# Patient Record
Sex: FEMALE | Race: BLACK OR AFRICAN AMERICAN | NOT HISPANIC OR LATINO | URBAN - METROPOLITAN AREA
[De-identification: names, ages, dates, MRNs, and addresses within clinical notes are randomized per-mention and may not be internally consistent; named-entity substitution may affect disease eponyms.]

---

## 2017-01-01 ENCOUNTER — OUTPATIENT (OUTPATIENT)
Dept: OUTPATIENT SERVICES | Facility: HOSPITAL | Age: 67
LOS: 1 days | Discharge: HOME | End: 2017-01-01

## 2017-01-01 DIAGNOSIS — F32.9 MAJOR DEPRESSIVE DISORDER, SINGLE EPISODE, UNSPECIFIED: ICD-10-CM

## 2017-01-11 ENCOUNTER — APPOINTMENT (OUTPATIENT)
Dept: PSYCHIATRY | Facility: CLINIC | Age: 67
End: 2017-01-11

## 2017-01-18 ENCOUNTER — APPOINTMENT (OUTPATIENT)
Dept: PSYCHIATRY | Facility: CLINIC | Age: 67
End: 2017-01-18

## 2017-02-09 ENCOUNTER — APPOINTMENT (OUTPATIENT)
Dept: PSYCHIATRY | Facility: CLINIC | Age: 67
End: 2017-02-09

## 2017-02-22 ENCOUNTER — APPOINTMENT (OUTPATIENT)
Dept: PSYCHIATRY | Facility: CLINIC | Age: 67
End: 2017-02-22

## 2017-03-15 ENCOUNTER — APPOINTMENT (OUTPATIENT)
Dept: PSYCHIATRY | Facility: CLINIC | Age: 67
End: 2017-03-15

## 2017-03-28 ENCOUNTER — APPOINTMENT (OUTPATIENT)
Dept: PSYCHIATRY | Facility: CLINIC | Age: 67
End: 2017-03-28

## 2017-03-29 ENCOUNTER — APPOINTMENT (OUTPATIENT)
Dept: PSYCHIATRY | Facility: CLINIC | Age: 67
End: 2017-03-29

## 2017-04-04 ENCOUNTER — APPOINTMENT (OUTPATIENT)
Dept: PSYCHIATRY | Facility: CLINIC | Age: 67
End: 2017-04-04

## 2017-04-18 ENCOUNTER — APPOINTMENT (OUTPATIENT)
Dept: PSYCHIATRY | Facility: CLINIC | Age: 67
End: 2017-04-18

## 2017-05-02 ENCOUNTER — APPOINTMENT (OUTPATIENT)
Dept: PSYCHIATRY | Facility: CLINIC | Age: 67
End: 2017-05-02

## 2017-05-04 ENCOUNTER — RESULT REVIEW (OUTPATIENT)
Age: 67
End: 2017-05-04

## 2017-05-04 ENCOUNTER — OUTPATIENT (OUTPATIENT)
Dept: OUTPATIENT SERVICES | Facility: HOSPITAL | Age: 67
LOS: 1 days | Discharge: HOME | End: 2017-05-04

## 2017-05-10 DIAGNOSIS — H02.825 CYSTS OF LEFT LOWER EYELID: ICD-10-CM

## 2017-05-10 LAB — SURGICAL PATHOLOGY STUDY: SIGNIFICANT CHANGE UP

## 2017-06-09 ENCOUNTER — APPOINTMENT (OUTPATIENT)
Dept: PSYCHIATRY | Facility: CLINIC | Age: 67
End: 2017-06-09

## 2017-06-14 ENCOUNTER — APPOINTMENT (OUTPATIENT)
Dept: PSYCHIATRY | Facility: CLINIC | Age: 67
End: 2017-06-14

## 2017-06-28 ENCOUNTER — APPOINTMENT (OUTPATIENT)
Dept: PSYCHIATRY | Facility: CLINIC | Age: 67
End: 2017-06-28

## 2017-07-01 ENCOUNTER — OUTPATIENT (OUTPATIENT)
Dept: OUTPATIENT SERVICES | Facility: HOSPITAL | Age: 67
LOS: 1 days | Discharge: HOME | End: 2017-07-01

## 2017-07-13 ENCOUNTER — APPOINTMENT (OUTPATIENT)
Dept: PSYCHIATRY | Facility: CLINIC | Age: 67
End: 2017-07-13

## 2017-08-16 ENCOUNTER — APPOINTMENT (OUTPATIENT)
Dept: PSYCHIATRY | Facility: CLINIC | Age: 67
End: 2017-08-16

## 2017-08-16 DIAGNOSIS — F32.2 MAJOR DEPRESSIVE DISORDER, SINGLE EPISODE, SEVERE WITHOUT PSYCHOTIC FEATURES: ICD-10-CM

## 2017-09-07 ENCOUNTER — APPOINTMENT (OUTPATIENT)
Dept: PSYCHIATRY | Facility: CLINIC | Age: 67
End: 2017-09-07
Payer: MEDICARE

## 2017-09-07 PROCEDURE — 99214 OFFICE O/P EST MOD 30 MIN: CPT

## 2017-09-25 ENCOUNTER — APPOINTMENT (OUTPATIENT)
Dept: PSYCHIATRY | Facility: CLINIC | Age: 67
End: 2017-09-25

## 2017-09-27 ENCOUNTER — APPOINTMENT (OUTPATIENT)
Dept: BREAST CENTER | Facility: CLINIC | Age: 67
End: 2017-09-27
Payer: MEDICARE

## 2017-09-27 VITALS
TEMPERATURE: 98 F | SYSTOLIC BLOOD PRESSURE: 146 MMHG | HEIGHT: 69 IN | HEART RATE: 80 BPM | BODY MASS INDEX: 25.77 KG/M2 | WEIGHT: 174 LBS | DIASTOLIC BLOOD PRESSURE: 85 MMHG

## 2017-09-27 DIAGNOSIS — Z80.3 FAMILY HISTORY OF MALIGNANT NEOPLASM OF BREAST: ICD-10-CM

## 2017-09-27 PROCEDURE — 99205 OFFICE O/P NEW HI 60 MIN: CPT

## 2017-10-01 ENCOUNTER — FORM ENCOUNTER (OUTPATIENT)
Age: 67
End: 2017-10-01

## 2017-10-02 ENCOUNTER — OUTPATIENT (OUTPATIENT)
Dept: OUTPATIENT SERVICES | Facility: HOSPITAL | Age: 67
LOS: 1 days | End: 2017-10-02
Payer: MEDICARE

## 2017-10-02 PROCEDURE — 76641 ULTRASOUND BREAST COMPLETE: CPT | Mod: 26,50

## 2017-10-04 ENCOUNTER — RESULT REVIEW (OUTPATIENT)
Age: 67
End: 2017-10-04

## 2017-10-04 PROCEDURE — 88321 CONSLTJ&REPRT SLD PREP ELSWR: CPT

## 2017-10-04 PROCEDURE — 76641 ULTRASOUND BREAST COMPLETE: CPT

## 2017-10-05 LAB
SURGICAL PATHOLOGY STUDY: SIGNIFICANT CHANGE UP
SURGICAL PATHOLOGY STUDY: SIGNIFICANT CHANGE UP

## 2017-10-06 ENCOUNTER — CLINICAL ADVICE (OUTPATIENT)
Age: 67
End: 2017-10-06

## 2017-10-06 ENCOUNTER — APPOINTMENT (OUTPATIENT)
Dept: PSYCHIATRY | Facility: CLINIC | Age: 67
End: 2017-10-06

## 2017-10-11 ENCOUNTER — APPOINTMENT (OUTPATIENT)
Dept: PSYCHIATRY | Facility: CLINIC | Age: 67
End: 2017-10-11
Payer: MEDICARE

## 2017-10-11 PROCEDURE — 99214 OFFICE O/P EST MOD 30 MIN: CPT

## 2017-10-19 ENCOUNTER — APPOINTMENT (OUTPATIENT)
Dept: PSYCHIATRY | Facility: CLINIC | Age: 67
End: 2017-10-19
Payer: MEDICARE

## 2017-10-19 PROCEDURE — 99213 OFFICE O/P EST LOW 20 MIN: CPT

## 2017-10-24 ENCOUNTER — FORM ENCOUNTER (OUTPATIENT)
Age: 67
End: 2017-10-24

## 2017-10-25 ENCOUNTER — OUTPATIENT (OUTPATIENT)
Dept: OUTPATIENT SERVICES | Facility: HOSPITAL | Age: 67
LOS: 1 days | End: 2017-10-25
Payer: MEDICARE

## 2017-10-25 PROCEDURE — C1739: CPT

## 2017-10-25 PROCEDURE — G0206: CPT | Mod: 26,RT

## 2017-10-25 PROCEDURE — 77065 DX MAMMO INCL CAD UNI: CPT

## 2017-10-25 PROCEDURE — 19285 PERQ DEV BREAST 1ST US IMAG: CPT | Mod: RT

## 2017-10-25 PROCEDURE — 19285 PERQ DEV BREAST 1ST US IMAG: CPT

## 2017-10-27 VITALS
OXYGEN SATURATION: 99 % | DIASTOLIC BLOOD PRESSURE: 77 MMHG | WEIGHT: 175.71 LBS | RESPIRATION RATE: 16 BRPM | SYSTOLIC BLOOD PRESSURE: 141 MMHG | TEMPERATURE: 98 F | HEART RATE: 84 BPM | HEIGHT: 68 IN

## 2017-10-27 NOTE — ASU PATIENT PROFILE, ADULT - PSH
Eardrum rupture, left    Foot pain  bilateral bone between pinky toe on the left and the right  Hernia, umbilical  as a child Eardrum rupture, left    Foot pain  bilateral bone between pinky toe on the left and the right  Hernia, umbilical  as a child  Lesion of eye  left eye stye

## 2017-10-29 ENCOUNTER — FORM ENCOUNTER (OUTPATIENT)
Age: 67
End: 2017-10-29

## 2017-10-29 ENCOUNTER — OUTPATIENT (OUTPATIENT)
Dept: OUTPATIENT SERVICES | Facility: HOSPITAL | Age: 67
LOS: 1 days | End: 2017-10-29
Payer: MEDICARE

## 2017-10-29 DIAGNOSIS — C50.911 MALIGNANT NEOPLASM OF UNSPECIFIED SITE OF RIGHT FEMALE BREAST: ICD-10-CM

## 2017-10-30 ENCOUNTER — RESULT REVIEW (OUTPATIENT)
Age: 67
End: 2017-10-30

## 2017-10-30 ENCOUNTER — OUTPATIENT (OUTPATIENT)
Dept: OUTPATIENT SERVICES | Facility: HOSPITAL | Age: 67
LOS: 1 days | Discharge: HOME | End: 2017-10-30
Payer: MEDICARE

## 2017-10-30 VITALS
OXYGEN SATURATION: 97 % | RESPIRATION RATE: 16 BRPM | DIASTOLIC BLOOD PRESSURE: 78 MMHG | HEART RATE: 79 BPM | SYSTOLIC BLOOD PRESSURE: 145 MMHG

## 2017-10-30 DIAGNOSIS — K42.9 UMBILICAL HERNIA WITHOUT OBSTRUCTION OR GANGRENE: Chronic | ICD-10-CM

## 2017-10-30 DIAGNOSIS — H72.92 UNSPECIFIED PERFORATION OF TYMPANIC MEMBRANE, LEFT EAR: Chronic | ICD-10-CM

## 2017-10-30 DIAGNOSIS — H57.9 UNSPECIFIED DISORDER OF EYE AND ADNEXA: Chronic | ICD-10-CM

## 2017-10-30 DIAGNOSIS — M79.673 PAIN IN UNSPECIFIED FOOT: Chronic | ICD-10-CM

## 2017-10-30 PROCEDURE — 78195 LYMPH SYSTEM IMAGING: CPT

## 2017-10-30 PROCEDURE — 88307 TISSUE EXAM BY PATHOLOGIST: CPT

## 2017-10-30 PROCEDURE — 19301 PARTIAL MASTECTOMY: CPT | Mod: GC

## 2017-10-30 PROCEDURE — 19301 PARTIAL MASTECTOMY: CPT | Mod: RT

## 2017-10-30 PROCEDURE — 15777 ACELLULAR DERM MATRIX IMPLT: CPT

## 2017-10-30 PROCEDURE — 38525 BIOPSY/REMOVAL LYMPH NODES: CPT | Mod: RT

## 2017-10-30 PROCEDURE — 88341 IMHCHEM/IMCYTCHM EA ADD ANTB: CPT

## 2017-10-30 PROCEDURE — 88342 IMHCHEM/IMCYTCHM 1ST ANTB: CPT

## 2017-10-30 PROCEDURE — A4648: CPT

## 2017-10-30 PROCEDURE — 76098 X-RAY EXAM SURGICAL SPECIMEN: CPT

## 2017-10-30 PROCEDURE — 38525 BIOPSY/REMOVAL LYMPH NODES: CPT | Mod: RT,GC

## 2017-10-30 PROCEDURE — A9541: CPT

## 2017-10-30 PROCEDURE — 88360 TUMOR IMMUNOHISTOCHEM/MANUAL: CPT

## 2017-10-30 PROCEDURE — 76098 X-RAY EXAM SURGICAL SPECIMEN: CPT | Mod: 26

## 2017-10-30 PROCEDURE — 78195 LYMPH SYSTEM IMAGING: CPT | Mod: 26

## 2017-10-30 RX ORDER — ONDANSETRON 8 MG/1
4 TABLET, FILM COATED ORAL EVERY 4 HOURS
Qty: 0 | Refills: 0 | Status: DISCONTINUED | OUTPATIENT
Start: 2017-10-30 | End: 2017-10-30

## 2017-10-30 RX ORDER — HYDROCODONE BITARTRATE AND ACETAMINOPHEN 7.5; 325 MG/15ML; MG/15ML
1 SOLUTION ORAL
Qty: 28 | Refills: 0
Start: 2017-10-30 | End: 2017-11-06

## 2017-10-30 RX ORDER — OXYCODONE AND ACETAMINOPHEN 5; 325 MG/1; MG/1
1 TABLET ORAL EVERY 4 HOURS
Qty: 0 | Refills: 0 | Status: DISCONTINUED | OUTPATIENT
Start: 2017-10-30 | End: 2017-10-30

## 2017-10-30 NOTE — BRIEF OPERATIVE NOTE - PROCEDURE
<<-----Click on this checkbox to enter Procedure Lumpectomy of right breast with sentinel node biopsy  10/30/2017    Active  ELVIS

## 2017-10-30 NOTE — PACU DISCHARGE NOTE - COMMENTS
pt verbalized undesratndig all discharged instructions  iv discontinuo  pt left accompanied by her daugther

## 2017-11-01 PROBLEM — F32.9 MAJOR DEPRESSIVE DISORDER, SINGLE EPISODE, UNSPECIFIED: Chronic | Status: ACTIVE | Noted: 2017-10-27

## 2017-11-02 DIAGNOSIS — Z91.041 RADIOGRAPHIC DYE ALLERGY STATUS: ICD-10-CM

## 2017-11-02 DIAGNOSIS — Z88.0 ALLERGY STATUS TO PENICILLIN: ICD-10-CM

## 2017-11-02 DIAGNOSIS — D05.11 INTRADUCTAL CARCINOMA IN SITU OF RIGHT BREAST: ICD-10-CM

## 2017-11-02 DIAGNOSIS — K21.9 GASTRO-ESOPHAGEAL REFLUX DISEASE WITHOUT ESOPHAGITIS: ICD-10-CM

## 2017-11-02 DIAGNOSIS — Z88.5 ALLERGY STATUS TO NARCOTIC AGENT: ICD-10-CM

## 2017-11-02 DIAGNOSIS — F17.210 NICOTINE DEPENDENCE, CIGARETTES, UNCOMPLICATED: ICD-10-CM

## 2017-11-02 DIAGNOSIS — N60.91 UNSPECIFIED BENIGN MAMMARY DYSPLASIA OF RIGHT BREAST: ICD-10-CM

## 2017-11-02 DIAGNOSIS — Z88.1 ALLERGY STATUS TO OTHER ANTIBIOTIC AGENTS STATUS: ICD-10-CM

## 2017-11-02 DIAGNOSIS — Z17.0 ESTROGEN RECEPTOR POSITIVE STATUS [ER+]: ICD-10-CM

## 2017-11-02 DIAGNOSIS — F32.9 MAJOR DEPRESSIVE DISORDER, SINGLE EPISODE, UNSPECIFIED: ICD-10-CM

## 2017-11-02 LAB — SURGICAL PATHOLOGY STUDY: SIGNIFICANT CHANGE UP

## 2017-11-10 ENCOUNTER — APPOINTMENT (OUTPATIENT)
Dept: PSYCHIATRY | Facility: CLINIC | Age: 67
End: 2017-11-10

## 2017-11-10 ENCOUNTER — APPOINTMENT (OUTPATIENT)
Dept: BREAST CENTER | Facility: CLINIC | Age: 67
End: 2017-11-10
Payer: MEDICARE

## 2017-11-10 VITALS
DIASTOLIC BLOOD PRESSURE: 95 MMHG | HEIGHT: 69 IN | TEMPERATURE: 97.5 F | HEART RATE: 96 BPM | BODY MASS INDEX: 25.77 KG/M2 | SYSTOLIC BLOOD PRESSURE: 146 MMHG | WEIGHT: 174 LBS | RESPIRATION RATE: 16 BRPM

## 2017-11-10 DIAGNOSIS — N64.89 OTHER SPECIFIED DISORDERS OF BREAST: ICD-10-CM

## 2017-11-10 PROCEDURE — 99024 POSTOP FOLLOW-UP VISIT: CPT

## 2017-11-16 ENCOUNTER — FORM ENCOUNTER (OUTPATIENT)
Age: 67
End: 2017-11-16

## 2017-11-17 ENCOUNTER — OUTPATIENT (OUTPATIENT)
Dept: OUTPATIENT SERVICES | Facility: HOSPITAL | Age: 67
LOS: 1 days | End: 2017-11-17
Payer: MEDICARE

## 2017-11-17 DIAGNOSIS — K42.9 UMBILICAL HERNIA WITHOUT OBSTRUCTION OR GANGRENE: Chronic | ICD-10-CM

## 2017-11-17 DIAGNOSIS — M79.673 PAIN IN UNSPECIFIED FOOT: Chronic | ICD-10-CM

## 2017-11-17 DIAGNOSIS — H57.9 UNSPECIFIED DISORDER OF EYE AND ADNEXA: Chronic | ICD-10-CM

## 2017-11-17 DIAGNOSIS — H72.92 UNSPECIFIED PERFORATION OF TYMPANIC MEMBRANE, LEFT EAR: Chronic | ICD-10-CM

## 2017-11-17 PROCEDURE — 76942 ECHO GUIDE FOR BIOPSY: CPT | Mod: 26

## 2017-11-17 PROCEDURE — 76942 ECHO GUIDE FOR BIOPSY: CPT

## 2017-11-17 PROCEDURE — 87070 CULTURE OTHR SPECIMN AEROBIC: CPT

## 2017-11-17 PROCEDURE — 87075 CULTR BACTERIA EXCEPT BLOOD: CPT

## 2017-11-17 PROCEDURE — 87205 SMEAR GRAM STAIN: CPT

## 2017-11-17 PROCEDURE — 87186 SC STD MICRODIL/AGAR DIL: CPT

## 2017-11-17 PROCEDURE — 10022: CPT

## 2017-11-18 LAB
GRAM STN FLD: SIGNIFICANT CHANGE UP
SPECIMEN SOURCE: SIGNIFICANT CHANGE UP

## 2017-11-20 LAB
-  CEFAZOLIN: SIGNIFICANT CHANGE UP
-  CLINDAMYCIN: SIGNIFICANT CHANGE UP
-  ERYTHROMYCIN: SIGNIFICANT CHANGE UP
-  LINEZOLID: SIGNIFICANT CHANGE UP
-  OXACILLIN: SIGNIFICANT CHANGE UP
-  PENICILLIN: SIGNIFICANT CHANGE UP
-  RIFAMPIN: SIGNIFICANT CHANGE UP
-  TRIMETHOPRIM/SULFAMETHOXAZOLE: SIGNIFICANT CHANGE UP
-  VANCOMYCIN: SIGNIFICANT CHANGE UP
CULTURE RESULTS: NO GROWTH — SIGNIFICANT CHANGE UP
CULTURE RESULTS: SIGNIFICANT CHANGE UP
METHOD TYPE: SIGNIFICANT CHANGE UP
ORGANISM # SPEC MICROSCOPIC CNT: SIGNIFICANT CHANGE UP
ORGANISM # SPEC MICROSCOPIC CNT: SIGNIFICANT CHANGE UP
SPECIMEN SOURCE: SIGNIFICANT CHANGE UP
SPECIMEN SOURCE: SIGNIFICANT CHANGE UP

## 2017-11-21 LAB
-  CEFAZOLIN: SIGNIFICANT CHANGE UP
-  CEFAZOLIN: SIGNIFICANT CHANGE UP
-  CLINDAMYCIN: SIGNIFICANT CHANGE UP
-  CLINDAMYCIN: SIGNIFICANT CHANGE UP
-  ERYTHROMYCIN: SIGNIFICANT CHANGE UP
-  ERYTHROMYCIN: SIGNIFICANT CHANGE UP
-  LINEZOLID: SIGNIFICANT CHANGE UP
-  LINEZOLID: SIGNIFICANT CHANGE UP
-  OXACILLIN: SIGNIFICANT CHANGE UP
-  OXACILLIN: SIGNIFICANT CHANGE UP
-  PENICILLIN: SIGNIFICANT CHANGE UP
-  PENICILLIN: SIGNIFICANT CHANGE UP
-  RIFAMPIN: SIGNIFICANT CHANGE UP
-  RIFAMPIN: SIGNIFICANT CHANGE UP
-  TRIMETHOPRIM/SULFAMETHOXAZOLE: SIGNIFICANT CHANGE UP
-  TRIMETHOPRIM/SULFAMETHOXAZOLE: SIGNIFICANT CHANGE UP
-  VANCOMYCIN: SIGNIFICANT CHANGE UP
-  VANCOMYCIN: SIGNIFICANT CHANGE UP
CULTURE RESULTS: SIGNIFICANT CHANGE UP
CULTURE RESULTS: SIGNIFICANT CHANGE UP
METHOD TYPE: SIGNIFICANT CHANGE UP
METHOD TYPE: SIGNIFICANT CHANGE UP
ORGANISM # SPEC MICROSCOPIC CNT: SIGNIFICANT CHANGE UP
SPECIMEN SOURCE: SIGNIFICANT CHANGE UP
SPECIMEN SOURCE: SIGNIFICANT CHANGE UP

## 2017-11-27 ENCOUNTER — APPOINTMENT (OUTPATIENT)
Dept: RADIATION ONCOLOGY | Facility: CLINIC | Age: 67
End: 2017-11-27
Payer: MEDICARE

## 2017-11-27 VITALS
SYSTOLIC BLOOD PRESSURE: 156 MMHG | WEIGHT: 180.56 LBS | HEART RATE: 85 BPM | DIASTOLIC BLOOD PRESSURE: 93 MMHG | BODY MASS INDEX: 26.66 KG/M2 | OXYGEN SATURATION: 99 %

## 2017-11-27 DIAGNOSIS — Z87.19 PERSONAL HISTORY OF OTHER DISEASES OF THE DIGESTIVE SYSTEM: ICD-10-CM

## 2017-11-27 PROCEDURE — 99205 OFFICE O/P NEW HI 60 MIN: CPT | Mod: 25

## 2017-11-27 PROCEDURE — 77263 THER RADIOLOGY TX PLNG CPLX: CPT

## 2017-11-27 RX ORDER — ALPRAZOLAM 0.25 MG/1
0.25 TABLET ORAL
Qty: 10 | Refills: 0 | Status: DISCONTINUED | COMMUNITY
Start: 2017-09-27 | End: 2017-11-27

## 2017-11-28 ENCOUNTER — OTHER (OUTPATIENT)
Age: 67
End: 2017-11-28

## 2017-11-29 ENCOUNTER — APPOINTMENT (OUTPATIENT)
Dept: BREAST CENTER | Facility: CLINIC | Age: 67
End: 2017-11-29

## 2017-12-04 ENCOUNTER — OTHER (OUTPATIENT)
Age: 67
End: 2017-12-04

## 2017-12-06 ENCOUNTER — FORM ENCOUNTER (OUTPATIENT)
Age: 67
End: 2017-12-06

## 2017-12-07 ENCOUNTER — OUTPATIENT (OUTPATIENT)
Dept: OUTPATIENT SERVICES | Facility: HOSPITAL | Age: 67
LOS: 1 days | End: 2017-12-07
Payer: MEDICARE

## 2017-12-07 DIAGNOSIS — M79.673 PAIN IN UNSPECIFIED FOOT: Chronic | ICD-10-CM

## 2017-12-07 DIAGNOSIS — H57.9 UNSPECIFIED DISORDER OF EYE AND ADNEXA: Chronic | ICD-10-CM

## 2017-12-07 DIAGNOSIS — K42.9 UMBILICAL HERNIA WITHOUT OBSTRUCTION OR GANGRENE: Chronic | ICD-10-CM

## 2017-12-07 DIAGNOSIS — H72.92 UNSPECIFIED PERFORATION OF TYMPANIC MEMBRANE, LEFT EAR: Chronic | ICD-10-CM

## 2017-12-07 PROCEDURE — 87070 CULTURE OTHR SPECIMN AEROBIC: CPT

## 2017-12-07 PROCEDURE — 76942 ECHO GUIDE FOR BIOPSY: CPT

## 2017-12-07 PROCEDURE — 87205 SMEAR GRAM STAIN: CPT

## 2017-12-07 PROCEDURE — 87075 CULTR BACTERIA EXCEPT BLOOD: CPT

## 2017-12-07 PROCEDURE — 10022: CPT

## 2017-12-07 PROCEDURE — 76942 ECHO GUIDE FOR BIOPSY: CPT | Mod: 26

## 2017-12-08 ENCOUNTER — APPOINTMENT (OUTPATIENT)
Dept: PSYCHIATRY | Facility: CLINIC | Age: 67
End: 2017-12-08
Payer: MEDICARE

## 2017-12-08 LAB
GRAM STN FLD: SIGNIFICANT CHANGE UP
SPECIMEN SOURCE: SIGNIFICANT CHANGE UP

## 2017-12-08 PROCEDURE — 99214 OFFICE O/P EST MOD 30 MIN: CPT

## 2017-12-08 RX ORDER — BUPROPION HYDROCHLORIDE 150 MG/1
1 TABLET, EXTENDED RELEASE ORAL
Qty: 30 | Refills: 2 | OUTPATIENT
Start: 2017-12-08 | End: 2018-03-07

## 2017-12-10 LAB
CULTURE RESULTS: NO GROWTH — SIGNIFICANT CHANGE UP
SPECIMEN SOURCE: SIGNIFICANT CHANGE UP

## 2017-12-11 ENCOUNTER — APPOINTMENT (OUTPATIENT)
Dept: PSYCHIATRY | Facility: CLINIC | Age: 67
End: 2017-12-11

## 2017-12-11 LAB
CULTURE RESULTS: NO GROWTH — SIGNIFICANT CHANGE UP
SPECIMEN SOURCE: SIGNIFICANT CHANGE UP

## 2017-12-19 ENCOUNTER — APPOINTMENT (OUTPATIENT)
Dept: PSYCHIATRY | Facility: CLINIC | Age: 67
End: 2017-12-19
Payer: MEDICARE

## 2017-12-19 PROCEDURE — 99213 OFFICE O/P EST LOW 20 MIN: CPT

## 2017-12-27 PROCEDURE — 77290 THER RAD SIMULAJ FIELD CPLX: CPT | Mod: 26

## 2017-12-27 PROCEDURE — 77334 RADIATION TREATMENT AID(S): CPT | Mod: 26

## 2018-01-01 ENCOUNTER — OUTPATIENT (OUTPATIENT)
Dept: OUTPATIENT SERVICES | Facility: HOSPITAL | Age: 68
LOS: 1 days | Discharge: HOME | End: 2018-01-01

## 2018-01-01 DIAGNOSIS — M79.673 PAIN IN UNSPECIFIED FOOT: Chronic | ICD-10-CM

## 2018-01-01 DIAGNOSIS — H57.9 UNSPECIFIED DISORDER OF EYE AND ADNEXA: Chronic | ICD-10-CM

## 2018-01-01 DIAGNOSIS — H72.92 UNSPECIFIED PERFORATION OF TYMPANIC MEMBRANE, LEFT EAR: Chronic | ICD-10-CM

## 2018-01-01 DIAGNOSIS — K42.9 UMBILICAL HERNIA WITHOUT OBSTRUCTION OR GANGRENE: Chronic | ICD-10-CM

## 2018-01-02 ENCOUNTER — APPOINTMENT (OUTPATIENT)
Dept: PSYCHIATRY | Facility: CLINIC | Age: 68
End: 2018-01-02

## 2018-01-02 DIAGNOSIS — F32.2 MAJOR DEPRESSIVE DISORDER, SINGLE EPISODE, SEVERE WITHOUT PSYCHOTIC FEATURES: ICD-10-CM

## 2018-01-04 PROCEDURE — 77300 RADIATION THERAPY DOSE PLAN: CPT | Mod: 26

## 2018-01-04 PROCEDURE — 77295 3-D RADIOTHERAPY PLAN: CPT | Mod: 26

## 2018-01-04 PROCEDURE — 77334 RADIATION TREATMENT AID(S): CPT | Mod: 26

## 2018-01-09 ENCOUNTER — APPOINTMENT (OUTPATIENT)
Dept: PSYCHIATRY | Facility: CLINIC | Age: 68
End: 2018-01-09
Payer: MEDICARE

## 2018-01-09 PROCEDURE — 99214 OFFICE O/P EST MOD 30 MIN: CPT

## 2018-01-11 PROCEDURE — 77280 THER RAD SIMULAJ FIELD SMPL: CPT | Mod: 26

## 2018-01-16 PROCEDURE — 77427 RADIATION TX MANAGEMENT X5: CPT

## 2018-01-16 PROCEDURE — 77387B: CUSTOM | Mod: 26

## 2018-01-17 VITALS
OXYGEN SATURATION: 100 % | WEIGHT: 176.56 LBS | DIASTOLIC BLOOD PRESSURE: 82 MMHG | SYSTOLIC BLOOD PRESSURE: 148 MMHG | HEART RATE: 85 BPM | BODY MASS INDEX: 26.07 KG/M2

## 2018-01-17 PROCEDURE — 77387B: CUSTOM | Mod: 26

## 2018-01-18 PROCEDURE — 77387B: CUSTOM | Mod: 26

## 2018-01-22 PROCEDURE — 77387B: CUSTOM | Mod: 26

## 2018-01-23 PROCEDURE — 77387B: CUSTOM | Mod: 26

## 2018-01-24 PROCEDURE — 77427 RADIATION TX MANAGEMENT X5: CPT

## 2018-01-24 PROCEDURE — 77387B: CUSTOM | Mod: 26

## 2018-01-25 ENCOUNTER — APPOINTMENT (OUTPATIENT)
Dept: PSYCHIATRY | Facility: CLINIC | Age: 68
End: 2018-01-25

## 2018-01-25 PROCEDURE — 77387B: CUSTOM | Mod: 26

## 2018-01-26 ENCOUNTER — APPOINTMENT (OUTPATIENT)
Dept: PSYCHIATRY | Facility: CLINIC | Age: 68
End: 2018-01-26
Payer: MEDICARE

## 2018-01-26 PROCEDURE — 77387B: CUSTOM | Mod: 26

## 2018-01-26 PROCEDURE — 99214 OFFICE O/P EST MOD 30 MIN: CPT

## 2018-01-29 PROCEDURE — 77387B: CUSTOM | Mod: 26

## 2018-01-30 PROCEDURE — 77387B: CUSTOM | Mod: 26

## 2018-01-31 VITALS
SYSTOLIC BLOOD PRESSURE: 136 MMHG | HEART RATE: 81 BPM | DIASTOLIC BLOOD PRESSURE: 92 MMHG | BODY MASS INDEX: 25.7 KG/M2 | WEIGHT: 174.06 LBS | OXYGEN SATURATION: 99 %

## 2018-01-31 PROCEDURE — 77387B: CUSTOM | Mod: 26

## 2018-02-01 PROCEDURE — 77387B: CUSTOM | Mod: 26

## 2018-02-01 PROCEDURE — 77427 RADIATION TX MANAGEMENT X5: CPT

## 2018-02-02 PROCEDURE — G6002: CPT | Mod: 26

## 2018-02-05 PROCEDURE — G6002: CPT | Mod: 26

## 2018-02-06 PROCEDURE — 77387B: CUSTOM | Mod: 26

## 2018-02-07 VITALS
HEART RATE: 76 BPM | WEIGHT: 174.2 LBS | RESPIRATION RATE: 18 BRPM | SYSTOLIC BLOOD PRESSURE: 130 MMHG | BODY MASS INDEX: 25.72 KG/M2 | OXYGEN SATURATION: 100 % | DIASTOLIC BLOOD PRESSURE: 88 MMHG

## 2018-02-07 PROCEDURE — 77387B: CUSTOM | Mod: 26

## 2018-02-09 ENCOUNTER — APPOINTMENT (OUTPATIENT)
Dept: BREAST CENTER | Facility: CLINIC | Age: 68
End: 2018-02-09
Payer: MEDICARE

## 2018-02-09 VITALS
RESPIRATION RATE: 16 BRPM | BODY MASS INDEX: 25.18 KG/M2 | SYSTOLIC BLOOD PRESSURE: 159 MMHG | DIASTOLIC BLOOD PRESSURE: 88 MMHG | HEART RATE: 70 BPM | WEIGHT: 170 LBS | HEIGHT: 69 IN

## 2018-02-09 PROCEDURE — 99213 OFFICE O/P EST LOW 20 MIN: CPT

## 2018-02-12 ENCOUNTER — APPOINTMENT (OUTPATIENT)
Dept: PSYCHIATRY | Facility: CLINIC | Age: 68
End: 2018-02-12

## 2018-02-15 ENCOUNTER — EMERGENCY (EMERGENCY)
Facility: HOSPITAL | Age: 68
LOS: 1 days | Discharge: HOME | End: 2018-02-15
Attending: EMERGENCY MEDICINE | Admitting: EMERGENCY MEDICINE
Payer: MEDICARE

## 2018-02-15 VITALS
DIASTOLIC BLOOD PRESSURE: 87 MMHG | SYSTOLIC BLOOD PRESSURE: 141 MMHG | RESPIRATION RATE: 16 BRPM | OXYGEN SATURATION: 99 % | TEMPERATURE: 98 F | HEART RATE: 77 BPM

## 2018-02-15 VITALS
HEART RATE: 97 BPM | DIASTOLIC BLOOD PRESSURE: 85 MMHG | OXYGEN SATURATION: 99 % | RESPIRATION RATE: 18 BRPM | SYSTOLIC BLOOD PRESSURE: 134 MMHG | TEMPERATURE: 99 F | WEIGHT: 172.4 LBS

## 2018-02-15 DIAGNOSIS — H57.9 UNSPECIFIED DISORDER OF EYE AND ADNEXA: Chronic | ICD-10-CM

## 2018-02-15 DIAGNOSIS — M79.673 PAIN IN UNSPECIFIED FOOT: Chronic | ICD-10-CM

## 2018-02-15 DIAGNOSIS — H72.92 UNSPECIFIED PERFORATION OF TYMPANIC MEMBRANE, LEFT EAR: Chronic | ICD-10-CM

## 2018-02-15 DIAGNOSIS — Z79.899 OTHER LONG TERM (CURRENT) DRUG THERAPY: ICD-10-CM

## 2018-02-15 DIAGNOSIS — Z79.891 LONG TERM (CURRENT) USE OF OPIATE ANALGESIC: ICD-10-CM

## 2018-02-15 DIAGNOSIS — Z91.048 OTHER NONMEDICINAL SUBSTANCE ALLERGY STATUS: ICD-10-CM

## 2018-02-15 DIAGNOSIS — I10 ESSENTIAL (PRIMARY) HYPERTENSION: ICD-10-CM

## 2018-02-15 DIAGNOSIS — Z88.0 ALLERGY STATUS TO PENICILLIN: ICD-10-CM

## 2018-02-15 DIAGNOSIS — Z91.013 ALLERGY TO SEAFOOD: ICD-10-CM

## 2018-02-15 DIAGNOSIS — K42.9 UMBILICAL HERNIA WITHOUT OBSTRUCTION OR GANGRENE: Chronic | ICD-10-CM

## 2018-02-15 LAB
ANION GAP SERPL CALC-SCNC: 14 MMOL/L — SIGNIFICANT CHANGE UP (ref 5–17)
BASOPHILS NFR BLD AUTO: 0.5 % — SIGNIFICANT CHANGE UP (ref 0–2)
BUN SERPL-MCNC: 12 MG/DL — SIGNIFICANT CHANGE UP (ref 7–23)
CALCIUM SERPL-MCNC: 10.3 MG/DL — SIGNIFICANT CHANGE UP (ref 8.4–10.5)
CHLORIDE SERPL-SCNC: 102 MMOL/L — SIGNIFICANT CHANGE UP (ref 96–108)
CO2 SERPL-SCNC: 24 MMOL/L — SIGNIFICANT CHANGE UP (ref 22–31)
CREAT SERPL-MCNC: 0.66 MG/DL — SIGNIFICANT CHANGE UP (ref 0.5–1.3)
EOSINOPHIL NFR BLD AUTO: 1.1 % — SIGNIFICANT CHANGE UP (ref 0–6)
GLUCOSE SERPL-MCNC: 98 MG/DL — SIGNIFICANT CHANGE UP (ref 70–99)
HCT VFR BLD CALC: 44.4 % — SIGNIFICANT CHANGE UP (ref 34.5–45)
HGB BLD-MCNC: 15.2 G/DL — SIGNIFICANT CHANGE UP (ref 11.5–15.5)
LYMPHOCYTES # BLD AUTO: 34.2 % — SIGNIFICANT CHANGE UP (ref 13–44)
MCHC RBC-ENTMCNC: 29.7 PG — SIGNIFICANT CHANGE UP (ref 27–34)
MCHC RBC-ENTMCNC: 34.2 G/DL — SIGNIFICANT CHANGE UP (ref 32–36)
MCV RBC AUTO: 86.7 FL — SIGNIFICANT CHANGE UP (ref 80–100)
MONOCYTES NFR BLD AUTO: 6.9 % — SIGNIFICANT CHANGE UP (ref 2–14)
NEUTROPHILS NFR BLD AUTO: 57.3 % — SIGNIFICANT CHANGE UP (ref 43–77)
PLATELET # BLD AUTO: 186 K/UL — SIGNIFICANT CHANGE UP (ref 150–400)
POTASSIUM SERPL-MCNC: 4.2 MMOL/L — SIGNIFICANT CHANGE UP (ref 3.5–5.3)
POTASSIUM SERPL-SCNC: 4.2 MMOL/L — SIGNIFICANT CHANGE UP (ref 3.5–5.3)
RBC # BLD: 5.12 M/UL — SIGNIFICANT CHANGE UP (ref 3.8–5.2)
RBC # FLD: 14.3 % — SIGNIFICANT CHANGE UP (ref 10.3–16.9)
SODIUM SERPL-SCNC: 140 MMOL/L — SIGNIFICANT CHANGE UP (ref 135–145)
TROPONIN T SERPL-MCNC: <0.01 NG/ML — SIGNIFICANT CHANGE UP (ref 0–0.01)
WBC # BLD: 10 K/UL — SIGNIFICANT CHANGE UP (ref 3.8–10.5)
WBC # FLD AUTO: 10 K/UL — SIGNIFICANT CHANGE UP (ref 3.8–10.5)

## 2018-02-15 PROCEDURE — 99283 EMERGENCY DEPT VISIT LOW MDM: CPT

## 2018-02-15 PROCEDURE — 84484 ASSAY OF TROPONIN QUANT: CPT

## 2018-02-15 PROCEDURE — 36415 COLL VENOUS BLD VENIPUNCTURE: CPT

## 2018-02-15 PROCEDURE — 85025 COMPLETE CBC W/AUTO DIFF WBC: CPT

## 2018-02-15 PROCEDURE — 80048 BASIC METABOLIC PNL TOTAL CA: CPT

## 2018-02-15 PROCEDURE — 99284 EMERGENCY DEPT VISIT MOD MDM: CPT

## 2018-02-15 NOTE — ED ADULT TRIAGE NOTE - CHIEF COMPLAINT QUOTE
Pt was referred by oncologist (receiving radiation for breast ca) to go to the ED for /64. Pt states had headache ~ 2 hours ago and felt better after eating. Denies any headache at the moment, n/v, dizziness or any other complaints.

## 2018-02-15 NOTE — ED PROVIDER NOTE - ATTENDING CONTRIBUTION TO CARE
Pt w asymtomatic htn, noted today as outpt and pharmacy. No cp, sob, abd pain, leg swelling, decreased  uop NAD, RRR, CTAB, no abdominal tenderness, no focal neuro deficits. To fu as oupt, Discussed with pt results of work up, strict return precautions, and need for follow up.  Pt expressed understanding and agrees with plan.

## 2018-02-15 NOTE — ED ADULT NURSE NOTE - OBJECTIVE STATEMENT
67y F, A&ox3, presents to ED for elevated bp, in 170s as per pt. PT reports was sent in by oncologist. PT denies headache, lightheadedness, dizziness, blurred vision. no facial droop, no slurring of speech, strength and sensation equal bilateral. NAD. Will continue to monitor.

## 2018-02-15 NOTE — ED PROVIDER NOTE - OBJECTIVE STATEMENT
66 y/o female with a PMHx of breast cancer who was sent by oncologist for an elevated BP earlier today. Pt states the reading was 160 then 170 and then she went to pharmacy after dc with a reading of 180 in which brought here to the ED. Pt denies the following: fever, chest pain, sob, difficulty breathing, headache, blurred vision.

## 2018-02-15 NOTE — ED PROVIDER NOTE - MEDICAL DECISION MAKING DETAILS
68 y/o female with no hx of elevated bp, had an abnormal bp reading at her oncology [facility] today. Here in ed with normal bp and hr. Pt given script for bp monitor and to fu with her pmd this week for further. pt agrees with plan and is safe for dc.

## 2018-02-28 ENCOUNTER — APPOINTMENT (OUTPATIENT)
Age: 68
End: 2018-02-28
Payer: MEDICARE

## 2018-03-06 ENCOUNTER — APPOINTMENT (OUTPATIENT)
Age: 68
End: 2018-03-06
Payer: MEDICARE

## 2018-03-06 VITALS
RESPIRATION RATE: 18 BRPM | DIASTOLIC BLOOD PRESSURE: 95 MMHG | HEART RATE: 95 BPM | BODY MASS INDEX: 24.84 KG/M2 | SYSTOLIC BLOOD PRESSURE: 156 MMHG | OXYGEN SATURATION: 96 % | WEIGHT: 168.2 LBS

## 2018-03-06 PROCEDURE — 99024 POSTOP FOLLOW-UP VISIT: CPT

## 2018-03-06 RX ORDER — ALPRAZOLAM 0.25 MG/1
0.25 TABLET ORAL
Qty: 3 | Refills: 0 | Status: DISCONTINUED | COMMUNITY
Start: 2017-12-21 | End: 2018-03-06

## 2018-03-06 RX ORDER — LORAZEPAM 1 MG/1
1 TABLET ORAL AS DIRECTED
Qty: 15 | Refills: 0 | Status: DISCONTINUED | COMMUNITY
Start: 2018-01-17 | End: 2018-03-06

## 2018-03-06 RX ORDER — ALPRAZOLAM 0.25 MG/1
0.25 TABLET ORAL
Qty: 3 | Refills: 0 | Status: DISCONTINUED | COMMUNITY
Start: 2017-12-04 | End: 2018-03-06

## 2018-03-07 ENCOUNTER — APPOINTMENT (OUTPATIENT)
Dept: PSYCHIATRY | Facility: CLINIC | Age: 68
End: 2018-03-07

## 2018-03-08 ENCOUNTER — APPOINTMENT (OUTPATIENT)
Dept: PSYCHIATRY | Facility: CLINIC | Age: 68
End: 2018-03-08
Payer: MEDICARE

## 2018-03-08 PROCEDURE — 99214 OFFICE O/P EST MOD 30 MIN: CPT

## 2018-03-22 ENCOUNTER — APPOINTMENT (OUTPATIENT)
Dept: PSYCHIATRY | Facility: CLINIC | Age: 68
End: 2018-03-22

## 2018-04-11 ENCOUNTER — APPOINTMENT (OUTPATIENT)
Dept: BREAST CENTER | Facility: CLINIC | Age: 68
End: 2018-04-11
Payer: MEDICARE

## 2018-04-11 VITALS
BODY MASS INDEX: 24.88 KG/M2 | DIASTOLIC BLOOD PRESSURE: 94 MMHG | TEMPERATURE: 97.1 F | SYSTOLIC BLOOD PRESSURE: 141 MMHG | WEIGHT: 168 LBS | HEART RATE: 81 BPM | HEIGHT: 69 IN

## 2018-04-11 PROCEDURE — 99213 OFFICE O/P EST LOW 20 MIN: CPT

## 2018-04-17 ENCOUNTER — APPOINTMENT (OUTPATIENT)
Dept: PSYCHIATRY | Facility: CLINIC | Age: 68
End: 2018-04-17
Payer: MEDICARE

## 2018-04-17 PROCEDURE — 99214 OFFICE O/P EST MOD 30 MIN: CPT

## 2018-04-18 ENCOUNTER — FORM ENCOUNTER (OUTPATIENT)
Age: 68
End: 2018-04-18

## 2018-04-19 ENCOUNTER — OUTPATIENT (OUTPATIENT)
Dept: OUTPATIENT SERVICES | Facility: HOSPITAL | Age: 68
LOS: 1 days | End: 2018-04-19
Payer: MEDICARE

## 2018-04-19 ENCOUNTER — APPOINTMENT (OUTPATIENT)
Dept: ULTRASOUND IMAGING | Facility: HOSPITAL | Age: 68
End: 2018-04-19

## 2018-04-19 ENCOUNTER — APPOINTMENT (OUTPATIENT)
Dept: MAMMOGRAPHY | Facility: HOSPITAL | Age: 68
End: 2018-04-19

## 2018-04-19 DIAGNOSIS — M79.673 PAIN IN UNSPECIFIED FOOT: Chronic | ICD-10-CM

## 2018-04-19 DIAGNOSIS — H72.92 UNSPECIFIED PERFORATION OF TYMPANIC MEMBRANE, LEFT EAR: Chronic | ICD-10-CM

## 2018-04-19 DIAGNOSIS — H57.9 UNSPECIFIED DISORDER OF EYE AND ADNEXA: Chronic | ICD-10-CM

## 2018-04-19 DIAGNOSIS — K42.9 UMBILICAL HERNIA WITHOUT OBSTRUCTION OR GANGRENE: Chronic | ICD-10-CM

## 2018-04-19 PROCEDURE — G0279: CPT | Mod: 26

## 2018-04-19 PROCEDURE — 77065 DX MAMMO INCL CAD UNI: CPT | Mod: 26,RT

## 2018-04-19 PROCEDURE — 76641 ULTRASOUND BREAST COMPLETE: CPT

## 2018-04-19 PROCEDURE — 76641 ULTRASOUND BREAST COMPLETE: CPT | Mod: 26,RT

## 2018-04-19 PROCEDURE — 77065 DX MAMMO INCL CAD UNI: CPT

## 2018-04-19 PROCEDURE — G0279: CPT

## 2018-05-08 ENCOUNTER — APPOINTMENT (OUTPATIENT)
Dept: PSYCHIATRY | Facility: CLINIC | Age: 68
End: 2018-05-08

## 2018-05-14 ENCOUNTER — APPOINTMENT (OUTPATIENT)
Dept: ULTRASOUND IMAGING | Facility: HOSPITAL | Age: 68
End: 2018-05-14
Payer: MEDICARE

## 2018-05-14 ENCOUNTER — OUTPATIENT (OUTPATIENT)
Dept: OUTPATIENT SERVICES | Facility: HOSPITAL | Age: 68
LOS: 1 days | End: 2018-05-14
Payer: MEDICARE

## 2018-05-14 DIAGNOSIS — M79.673 PAIN IN UNSPECIFIED FOOT: Chronic | ICD-10-CM

## 2018-05-14 DIAGNOSIS — K42.9 UMBILICAL HERNIA WITHOUT OBSTRUCTION OR GANGRENE: Chronic | ICD-10-CM

## 2018-05-14 DIAGNOSIS — H57.9 UNSPECIFIED DISORDER OF EYE AND ADNEXA: Chronic | ICD-10-CM

## 2018-05-14 DIAGNOSIS — H72.92 UNSPECIFIED PERFORATION OF TYMPANIC MEMBRANE, LEFT EAR: Chronic | ICD-10-CM

## 2018-05-14 PROCEDURE — 93971 EXTREMITY STUDY: CPT

## 2018-05-14 PROCEDURE — 93971 EXTREMITY STUDY: CPT | Mod: 26,RT

## 2018-05-21 ENCOUNTER — APPOINTMENT (OUTPATIENT)
Dept: PSYCHIATRY | Facility: CLINIC | Age: 68
End: 2018-05-21
Payer: MEDICARE

## 2018-05-21 PROCEDURE — 99213 OFFICE O/P EST LOW 20 MIN: CPT

## 2018-06-08 ENCOUNTER — APPOINTMENT (OUTPATIENT)
Dept: BREAST CENTER | Facility: CLINIC | Age: 68
End: 2018-06-08

## 2018-07-10 ENCOUNTER — APPOINTMENT (OUTPATIENT)
Dept: PSYCHIATRY | Facility: CLINIC | Age: 68
End: 2018-07-10

## 2018-07-11 ENCOUNTER — APPOINTMENT (OUTPATIENT)
Age: 68
End: 2018-07-11

## 2018-07-16 RX ORDER — TRAZODONE HCL 50 MG
2 TABLET ORAL
Qty: 60 | Refills: 4 | OUTPATIENT
Start: 2018-07-16 | End: 2018-12-12

## 2018-07-16 RX ORDER — ESCITALOPRAM OXALATE 10 MG/1
1 TABLET, FILM COATED ORAL
Qty: 30 | Refills: 4 | OUTPATIENT
Start: 2018-07-16 | End: 2018-12-12

## 2018-07-23 ENCOUNTER — APPOINTMENT (OUTPATIENT)
Dept: PSYCHIATRY | Facility: CLINIC | Age: 68
End: 2018-07-23
Payer: MEDICARE

## 2018-07-23 PROCEDURE — 99214 OFFICE O/P EST MOD 30 MIN: CPT

## 2018-08-01 ENCOUNTER — APPOINTMENT (OUTPATIENT)
Age: 68
End: 2018-08-01
Payer: MEDICARE

## 2018-08-01 PROCEDURE — 99214 OFFICE O/P EST MOD 30 MIN: CPT

## 2018-08-01 RX ORDER — HYDROCODONE BITARTRATE AND ACETAMINOPHEN 5; 325 MG/1; MG/1
5-325 TABLET ORAL
Qty: 28 | Refills: 0 | Status: DISCONTINUED | COMMUNITY
Start: 2017-10-30 | End: 2018-08-01

## 2018-08-01 RX ORDER — ANASTROZOLE TABLETS 1 MG/1
1 TABLET ORAL
Qty: 90 | Refills: 0 | Status: DISCONTINUED | COMMUNITY
Start: 2018-02-16 | End: 2018-08-01

## 2018-08-01 RX ORDER — BIMATOPROST 0.1 MG/ML
0.01 SOLUTION/ DROPS OPHTHALMIC
Qty: 2 | Refills: 0 | Status: DISCONTINUED | COMMUNITY
Start: 2017-09-18 | End: 2018-08-01

## 2018-08-21 ENCOUNTER — APPOINTMENT (OUTPATIENT)
Dept: PSYCHIATRY | Facility: CLINIC | Age: 68
End: 2018-08-21

## 2018-09-17 ENCOUNTER — APPOINTMENT (OUTPATIENT)
Dept: PSYCHIATRY | Facility: CLINIC | Age: 68
End: 2018-09-17
Payer: MEDICARE

## 2018-09-17 PROCEDURE — 99215 OFFICE O/P EST HI 40 MIN: CPT

## 2018-10-15 ENCOUNTER — FORM ENCOUNTER (OUTPATIENT)
Age: 68
End: 2018-10-15

## 2018-10-16 ENCOUNTER — APPOINTMENT (OUTPATIENT)
Dept: MAMMOGRAPHY | Facility: HOSPITAL | Age: 68
End: 2018-10-16
Payer: MEDICARE

## 2018-10-16 ENCOUNTER — APPOINTMENT (OUTPATIENT)
Dept: ULTRASOUND IMAGING | Facility: HOSPITAL | Age: 68
End: 2018-10-16
Payer: MEDICARE

## 2018-10-16 ENCOUNTER — OUTPATIENT (OUTPATIENT)
Dept: OUTPATIENT SERVICES | Facility: HOSPITAL | Age: 68
LOS: 1 days | End: 2018-10-16
Payer: MEDICARE

## 2018-10-16 DIAGNOSIS — K42.9 UMBILICAL HERNIA WITHOUT OBSTRUCTION OR GANGRENE: Chronic | ICD-10-CM

## 2018-10-16 DIAGNOSIS — H72.92 UNSPECIFIED PERFORATION OF TYMPANIC MEMBRANE, LEFT EAR: Chronic | ICD-10-CM

## 2018-10-16 DIAGNOSIS — H57.9 UNSPECIFIED DISORDER OF EYE AND ADNEXA: Chronic | ICD-10-CM

## 2018-10-16 DIAGNOSIS — M79.673 PAIN IN UNSPECIFIED FOOT: Chronic | ICD-10-CM

## 2018-10-16 PROCEDURE — G0279: CPT

## 2018-10-16 PROCEDURE — 77066 DX MAMMO INCL CAD BI: CPT | Mod: 26

## 2018-10-16 PROCEDURE — 76641 ULTRASOUND BREAST COMPLETE: CPT

## 2018-10-16 PROCEDURE — 76641 ULTRASOUND BREAST COMPLETE: CPT | Mod: 26,RT

## 2018-10-16 PROCEDURE — G0279: CPT | Mod: 26

## 2018-10-16 PROCEDURE — 77066 DX MAMMO INCL CAD BI: CPT

## 2018-10-17 ENCOUNTER — OTHER (OUTPATIENT)
Age: 68
End: 2018-10-17

## 2018-10-17 ENCOUNTER — APPOINTMENT (OUTPATIENT)
Dept: BREAST CENTER | Facility: CLINIC | Age: 68
End: 2018-10-17
Payer: MEDICARE

## 2018-10-17 VITALS
HEIGHT: 69 IN | BODY MASS INDEX: 24.88 KG/M2 | DIASTOLIC BLOOD PRESSURE: 94 MMHG | HEART RATE: 91 BPM | WEIGHT: 168 LBS | SYSTOLIC BLOOD PRESSURE: 151 MMHG | TEMPERATURE: 98.6 F

## 2018-10-17 PROCEDURE — 99213 OFFICE O/P EST LOW 20 MIN: CPT

## 2018-11-01 ENCOUNTER — APPOINTMENT (OUTPATIENT)
Dept: PSYCHIATRY | Facility: CLINIC | Age: 68
End: 2018-11-01

## 2018-11-16 ENCOUNTER — APPOINTMENT (OUTPATIENT)
Dept: PSYCHIATRY | Facility: CLINIC | Age: 68
End: 2018-11-16
Payer: MEDICARE

## 2018-11-16 VITALS
RESPIRATION RATE: 16 BRPM | TEMPERATURE: 97 F | HEIGHT: 68 IN | DIASTOLIC BLOOD PRESSURE: 84 MMHG | SYSTOLIC BLOOD PRESSURE: 130 MMHG | WEIGHT: 167.55 LBS | HEART RATE: 88 BPM | OXYGEN SATURATION: 95 %

## 2018-11-16 PROCEDURE — 99214 OFFICE O/P EST MOD 30 MIN: CPT

## 2018-11-16 NOTE — ASU PATIENT PROFILE, ADULT - VISION (WITH CORRECTIVE LENSES IF THE PATIENT USUALLY WEARS THEM):
glasses Partially impaired: cannot see medication labels or newsprint, but can see obstacles in path, and the surrounding layout; can count fingers at arm's length/glasses

## 2018-11-16 NOTE — ASU PATIENT PROFILE, ADULT - PSH
Eardrum rupture, left    Foot pain  bilateral bone between pinky toe on the left and the right  H/O lumpectomy    Hernia, umbilical  as a child  Lesion of eye  left eye stye Eardrum rupture, left    Foot pain  bilateral bone between pinky toe on the left and the right  H/O lumpectomy  right- cancer  Hernia, umbilical  as a child  Lesion of eye  left eye stye

## 2018-11-19 ENCOUNTER — OUTPATIENT (OUTPATIENT)
Dept: OUTPATIENT SERVICES | Facility: HOSPITAL | Age: 68
LOS: 1 days | Discharge: HOME | End: 2018-11-19
Payer: MEDICARE

## 2018-11-19 ENCOUNTER — APPOINTMENT (OUTPATIENT)
Dept: BREAST CENTER | Facility: HOSPITAL | Age: 68
End: 2018-11-19

## 2018-11-19 ENCOUNTER — RESULT REVIEW (OUTPATIENT)
Age: 68
End: 2018-11-19

## 2018-11-19 VITALS
TEMPERATURE: 97 F | DIASTOLIC BLOOD PRESSURE: 80 MMHG | HEART RATE: 90 BPM | OXYGEN SATURATION: 95 % | SYSTOLIC BLOOD PRESSURE: 135 MMHG

## 2018-11-19 DIAGNOSIS — H72.92 UNSPECIFIED PERFORATION OF TYMPANIC MEMBRANE, LEFT EAR: Chronic | ICD-10-CM

## 2018-11-19 DIAGNOSIS — M79.673 PAIN IN UNSPECIFIED FOOT: Chronic | ICD-10-CM

## 2018-11-19 DIAGNOSIS — K42.9 UMBILICAL HERNIA WITHOUT OBSTRUCTION OR GANGRENE: Chronic | ICD-10-CM

## 2018-11-19 DIAGNOSIS — Z98.890 OTHER SPECIFIED POSTPROCEDURAL STATES: Chronic | ICD-10-CM

## 2018-11-19 DIAGNOSIS — H57.9 UNSPECIFIED DISORDER OF EYE AND ADNEXA: Chronic | ICD-10-CM

## 2018-11-19 PROCEDURE — 19125 EXCISION BREAST LESION: CPT | Mod: RT

## 2018-11-19 PROCEDURE — 19120 REMOVAL OF BREAST LESION: CPT | Mod: GC

## 2018-11-19 PROCEDURE — 88305 TISSUE EXAM BY PATHOLOGIST: CPT

## 2018-11-19 RX ORDER — OXYCODONE AND ACETAMINOPHEN 5; 325 MG/1; MG/1
1 TABLET ORAL
Qty: 5 | Refills: 0
Start: 2018-11-19

## 2018-11-19 RX ORDER — ONDANSETRON 8 MG/1
4 TABLET, FILM COATED ORAL
Qty: 0 | Refills: 0 | Status: DISCONTINUED | OUTPATIENT
Start: 2018-11-19 | End: 2018-11-19

## 2018-11-19 RX ORDER — OXYCODONE HYDROCHLORIDE 5 MG/1
5 TABLET ORAL EVERY 4 HOURS
Qty: 0 | Refills: 0 | Status: DISCONTINUED | OUTPATIENT
Start: 2018-11-19 | End: 2018-11-19

## 2018-11-19 NOTE — BRIEF OPERATIVE NOTE - PROCEDURE
<<-----Click on this checkbox to enter Procedure Excisional biopsy of mass of breast  11/19/2018  Removal of Biozorb and inflammatory breast tissue  Active  MAUREEN

## 2018-11-19 NOTE — BRIEF OPERATIVE NOTE - OPERATION/FINDINGS
Incision over prior right breast incision was made using scalpel.  Electrocautery was used to dissect down to Biozorb.  Biozorb and surrounding inflammatory breast tissue was excised and sent to pathology.  Surgical site was irrigated and closed using vicryl suture.  Hemostasis was attained.  Incision was closed superficially with monocryl suture.

## 2018-11-20 LAB — SURGICAL PATHOLOGY STUDY: SIGNIFICANT CHANGE UP

## 2018-11-28 ENCOUNTER — APPOINTMENT (OUTPATIENT)
Dept: BREAST CENTER | Facility: CLINIC | Age: 68
End: 2018-11-28
Payer: MEDICARE

## 2018-11-28 VITALS
HEIGHT: 69 IN | BODY MASS INDEX: 24.88 KG/M2 | HEART RATE: 78 BPM | TEMPERATURE: 98.9 F | DIASTOLIC BLOOD PRESSURE: 81 MMHG | WEIGHT: 168 LBS | SYSTOLIC BLOOD PRESSURE: 131 MMHG

## 2018-11-28 PROCEDURE — 99024 POSTOP FOLLOW-UP VISIT: CPT

## 2018-12-19 ENCOUNTER — APPOINTMENT (OUTPATIENT)
Dept: PSYCHIATRY | Facility: CLINIC | Age: 68
End: 2018-12-19
Payer: MEDICARE

## 2018-12-19 PROCEDURE — 99213 OFFICE O/P EST LOW 20 MIN: CPT

## 2019-01-01 ENCOUNTER — OUTPATIENT (OUTPATIENT)
Dept: OUTPATIENT SERVICES | Facility: HOSPITAL | Age: 69
LOS: 1 days | Discharge: HOME | End: 2019-01-01

## 2019-01-01 DIAGNOSIS — H57.9 UNSPECIFIED DISORDER OF EYE AND ADNEXA: Chronic | ICD-10-CM

## 2019-01-01 DIAGNOSIS — Z98.890 OTHER SPECIFIED POSTPROCEDURAL STATES: Chronic | ICD-10-CM

## 2019-01-01 DIAGNOSIS — K42.9 UMBILICAL HERNIA WITHOUT OBSTRUCTION OR GANGRENE: Chronic | ICD-10-CM

## 2019-01-01 DIAGNOSIS — H72.92 UNSPECIFIED PERFORATION OF TYMPANIC MEMBRANE, LEFT EAR: Chronic | ICD-10-CM

## 2019-01-01 DIAGNOSIS — M79.673 PAIN IN UNSPECIFIED FOOT: Chronic | ICD-10-CM

## 2019-01-16 ENCOUNTER — APPOINTMENT (OUTPATIENT)
Dept: PSYCHIATRY | Facility: CLINIC | Age: 69
End: 2019-01-16

## 2019-01-28 ENCOUNTER — CHART COPY (OUTPATIENT)
Age: 69
End: 2019-01-28

## 2019-01-31 ENCOUNTER — APPOINTMENT (OUTPATIENT)
Dept: RADIOLOGY | Facility: HOSPITAL | Age: 69
End: 2019-01-31

## 2019-02-05 NOTE — VITALS
[Least Pain Intensity: 0/10] : 0/10 [Pain Location: ___] : Pain Location: [unfilled] [NSAID/Non-Opioid] : NSAID/Non-Opioid [90: Able to carry normal activity; minor signs or symptoms of disease.] : 90: Able to carry normal activity; minor signs or symptoms of disease.  [Date: ____________] : Patient's last distress assessment performed on [unfilled].

## 2019-02-06 ENCOUNTER — APPOINTMENT (OUTPATIENT)
Dept: RADIATION ONCOLOGY | Facility: CLINIC | Age: 69
End: 2019-02-06
Payer: MEDICARE

## 2019-02-06 VITALS
SYSTOLIC BLOOD PRESSURE: 152 MMHG | BODY MASS INDEX: 24.59 KG/M2 | DIASTOLIC BLOOD PRESSURE: 101 MMHG | RESPIRATION RATE: 15 BRPM | WEIGHT: 166.5 LBS | OXYGEN SATURATION: 98 % | HEART RATE: 80 BPM | TEMPERATURE: 98.2 F

## 2019-02-06 VITALS — SYSTOLIC BLOOD PRESSURE: 124 MMHG | RESPIRATION RATE: 18 BRPM | HEART RATE: 76 BPM | DIASTOLIC BLOOD PRESSURE: 72 MMHG

## 2019-02-06 PROCEDURE — 99214 OFFICE O/P EST MOD 30 MIN: CPT

## 2019-02-08 NOTE — PHYSICAL EXAM
[General Appearance - Well Developed] : well developed [General Appearance - Alert] : alert [General Appearance - In No Acute Distress] : in no acute distress [] : no respiratory distress [Respiration, Rhythm And Depth] : normal respiratory rhythm and effort [Auscultation Breath Sounds / Voice Sounds] : lungs were clear to auscultation bilaterally [Heart Rate And Rhythm] : heart rate and rhythm were normal [Heart Sounds] : normal S1 and S2 [Bowel Sounds] : normal bowel sounds [Abdomen Soft] : soft [Oriented To Time, Place, And Person] : oriented to person, place, and time [Sclera] : the sclera and conjunctiva were normal [Extraocular Movements] : extraocular movements were intact [No UE Edema] : there is no upper extremity edema [Normal] : no palpable adenopathy [de-identified] : grade 1 hyperpigmentation to right breast, axilla with patch of hypopigmentation. surgical incisions right breast and axilla well healed.  [de-identified] : grade 1 hyperpigmentation to right breast, axilla with patch of hypopigmentation. surgical incisions right breast and axilla well healed.  [de-identified] : anxious [FreeTextEntry1] : BP was repeated manually-- 124/ 72.

## 2019-02-08 NOTE — REASON FOR VISIT
[Pathological -- TMN Staging] : TNM Stage: p [T: ___] : T[unfilled] [N: ___] : N[unfilled] [M: ___] : M[unfilled] [Other: _____] : [unfilled] [FreeTextEntry1] : Right Breast ER/DC + HER2 -

## 2019-02-08 NOTE — HISTORY OF PRESENT ILLNESS
[FreeTextEntry1] : Ms. Gutiérrez completed adjuvant RT for dose of 4240 cGy to the right breast for T1c N0 M0, ER/RI positive, HER 2 negative breast cancer from 1/16/18-2/7/18. She experienced fatigue and anxiety during treatment controlled with lorazepam.\par \par 2/6/19- FOLLOW UP\par Ms Gutiérrez presents today for routine follow up.  She is currently taking Tamoxifen, managed by Dr. Kilgore.\par She completed a b/l mammo-sendy and Right sono on 10/16/18 which showed Post radiation and operative changes to right breast, 6 months Mammo f/u was recommended. On 11/19/2018, biozorb was removed.  She last saw Dr. Fonseca on 11/28/18 with plan to follow up 6 months. \par \par Today, she reports she is feeling well. Denies breast pain, palpable masses, breast or UE edema, or any other c/o with the exception of hot flashes. \par \par 8/1/18- Ms. Gutiérrez presents today for routine follow up. \par She saw Dr. Fonseca in April 2018. She underwent mammogram on 4/19/18, which revealed post operative radiation changes in the righ tbresat. Ultrasound in the right breast was benign. She was noted to be due for left breast mammogram/ultrasound. \par Today she is feeling well. Recently switched from anastrozole to tamoxifen due to sweating. In a great mood. Not as bothered by the biozorb. \par \par \par ONCOLOGY HISTORY\par Ms. Hali Gutiérrez presented initially for consideration for radiation therapy on 11/27/17\par \par Her relevant medical history dates back to diagnostic mammogram and sonogram on 8/30/17.  Results of this revealed a 1.5 cm irregular mass in the right breast, 1:00 axis, 9 FN. Ultrasound guided biopsy was recommended.  Core biopsy was done 9/19/17. Pathology showed invasive ductal carcinoma, ER+, RI +, HER2 negaitve. This was reviewed at [facility]. \par \par On 10/30/17 she underwent a right breast lumpectomy.  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 [place] 3/9. 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 No LVI present margins negative, closest margins superior and inferior at 0.6 cm, remaining margins >1 cm. 1 right sentinel lymph node negative for carcinoma. ER+, RI +, HER 2 negative. \par \par After surgery, she has required drainage of seromas in both incision sites, lateral and medial. At the time of consult she noted a re-accumulation of fluid in the lateral site. She had a good deal of pain still in her right axilla.  She additionally had difficulty raising her right arm above her head.  Since stopping HRT after diagnosis, she notes she had a lot of hot flashes. She woke up after sleeping soaked in sweat.  She had no evidence of lymphedema.  She denied fevers, chills, or night sweats.  \par \par 3/6/18- PTE\par Ms. Gutiérrez  completed adjuvant RT to the right breast  for T1c N0 M0, ER/RI positive, HER 2 negative breast cancer from 1/16/18-2/7/18. She experienced fatigue and anxiety during treatment controlled with lorazepam. Saw Dr. Kilgore 2 weeks ago and started on Anastrazole.\par Since last visit has been having increased anxiety. Has appt. with psychiatrist  3/8/18.  \par Pt. reports:\par -feeling fatigued. has very little energy\par -has intermittent sharp right breast pain 8-9/10.  Denies chest pain.  Takes ibuprofen prn with relief\par -right breast skin intact with skin  darkening and lightened area under axilla

## 2019-02-08 NOTE — REVIEW OF SYSTEMS
[Night Sweats] : night sweats [Fatigue] : fatigue [Anxiety] : anxiety [Hot Flashes] : hot flashes [Negative] : Psychiatric [Constipation: Grade 0] : Constipation: Grade 0 [Diarrhea: Grade 0] : Diarrhea: Grade 0 [Fatigue: Grade 0] : Fatigue: Grade 0 [Breast Pain: Grade 0] : Breast Pain: Grade 0 [Lethargy: Grade 0] : Lethargy: Grade 0 [Anxiety: Grade 2 - Moderate symptoms; limiting instrumental ADL] : Anxiety: Grade 2 - Moderate symptoms; limiting instrumental ADL [Skin Hyperpigmentation: Grade 0] : Skin Hyperpigmentation: Grade 0 [Dermatitis Radiation: Grade 0] : Dermatitis Radiation: Grade 0 [Visual Disturbances] : no visual disturbances [Chest Pain] : no chest pain [Palpitations] : no palpitations [Shortness Of Breath] : no shortness of breath [Cough] : no cough [Abdominal Pain] : no abdominal pain [Constipation] : no constipation [FreeTextEntry2] : lost around 10 pounds recently [de-identified] : right breast with darkened skin, axilla with patch of  hypopigmentation. Surgical incisions right breast and axilla well healed. [de-identified] : started on clonazepam

## 2019-02-12 ENCOUNTER — APPOINTMENT (OUTPATIENT)
Dept: PSYCHIATRY | Facility: CLINIC | Age: 69
End: 2019-02-12

## 2019-02-26 ENCOUNTER — APPOINTMENT (OUTPATIENT)
Dept: PSYCHIATRY | Facility: CLINIC | Age: 69
End: 2019-02-26
Payer: MEDICARE

## 2019-02-26 PROCEDURE — 99214 OFFICE O/P EST MOD 30 MIN: CPT

## 2019-03-13 ENCOUNTER — APPOINTMENT (OUTPATIENT)
Dept: PSYCHIATRY | Facility: CLINIC | Age: 69
End: 2019-03-13
Payer: MEDICARE

## 2019-03-13 PROCEDURE — 99214 OFFICE O/P EST MOD 30 MIN: CPT

## 2019-03-29 DIAGNOSIS — F34.1 DYSTHYMIC DISORDER: ICD-10-CM

## 2019-04-03 ENCOUNTER — APPOINTMENT (OUTPATIENT)
Dept: PSYCHIATRY | Facility: CLINIC | Age: 69
End: 2019-04-03

## 2019-04-22 ENCOUNTER — APPOINTMENT (OUTPATIENT)
Dept: ULTRASOUND IMAGING | Facility: HOSPITAL | Age: 69
End: 2019-04-22
Payer: MEDICARE

## 2019-04-22 ENCOUNTER — APPOINTMENT (OUTPATIENT)
Dept: MAMMOGRAPHY | Facility: HOSPITAL | Age: 69
End: 2019-04-22
Payer: MEDICARE

## 2019-04-22 ENCOUNTER — OUTPATIENT (OUTPATIENT)
Dept: OUTPATIENT SERVICES | Facility: HOSPITAL | Age: 69
LOS: 1 days | End: 2019-04-22
Payer: MEDICARE

## 2019-04-22 DIAGNOSIS — M79.673 PAIN IN UNSPECIFIED FOOT: Chronic | ICD-10-CM

## 2019-04-22 DIAGNOSIS — Z98.890 OTHER SPECIFIED POSTPROCEDURAL STATES: Chronic | ICD-10-CM

## 2019-04-22 DIAGNOSIS — H72.92 UNSPECIFIED PERFORATION OF TYMPANIC MEMBRANE, LEFT EAR: Chronic | ICD-10-CM

## 2019-04-22 DIAGNOSIS — K42.9 UMBILICAL HERNIA WITHOUT OBSTRUCTION OR GANGRENE: Chronic | ICD-10-CM

## 2019-04-22 DIAGNOSIS — H57.9 UNSPECIFIED DISORDER OF EYE AND ADNEXA: Chronic | ICD-10-CM

## 2019-04-22 PROCEDURE — 77065 DX MAMMO INCL CAD UNI: CPT | Mod: 26,RT

## 2019-04-22 PROCEDURE — G0279: CPT | Mod: 26

## 2019-04-22 PROCEDURE — G0279: CPT

## 2019-04-22 PROCEDURE — 77065 DX MAMMO INCL CAD UNI: CPT

## 2019-05-14 ENCOUNTER — APPOINTMENT (OUTPATIENT)
Dept: PSYCHIATRY | Facility: CLINIC | Age: 69
End: 2019-05-14
Payer: MEDICARE

## 2019-05-14 PROCEDURE — 99214 OFFICE O/P EST MOD 30 MIN: CPT

## 2019-05-24 ENCOUNTER — APPOINTMENT (OUTPATIENT)
Dept: BREAST CENTER | Facility: CLINIC | Age: 69
End: 2019-05-24

## 2019-06-05 ENCOUNTER — APPOINTMENT (OUTPATIENT)
Dept: PSYCHIATRY | Facility: CLINIC | Age: 69
End: 2019-06-05
Payer: MEDICARE

## 2019-06-05 PROCEDURE — 99215 OFFICE O/P EST HI 40 MIN: CPT

## 2019-06-19 ENCOUNTER — APPOINTMENT (OUTPATIENT)
Dept: PSYCHIATRY | Facility: CLINIC | Age: 69
End: 2019-06-19
Payer: MEDICARE

## 2019-06-19 PROCEDURE — 99214 OFFICE O/P EST MOD 30 MIN: CPT

## 2019-07-18 ENCOUNTER — APPOINTMENT (OUTPATIENT)
Dept: PSYCHIATRY | Facility: CLINIC | Age: 69
End: 2019-07-18

## 2019-08-21 ENCOUNTER — APPOINTMENT (OUTPATIENT)
Dept: RADIATION ONCOLOGY | Facility: CLINIC | Age: 69
End: 2019-08-21
Payer: MEDICARE

## 2019-08-21 PROCEDURE — 99214 OFFICE O/P EST MOD 30 MIN: CPT

## 2019-08-21 RX ORDER — TAMOXIFEN CITRATE 10 MG/1
10 TABLET, FILM COATED ORAL
Refills: 0 | Status: DISCONTINUED | COMMUNITY
Start: 2018-08-01 | End: 2019-08-21

## 2019-08-21 NOTE — VITALS
[Least Pain Intensity: 0/10] : 0/10 [90: Able to carry normal activity; minor signs or symptoms of disease.] : 90: Able to carry normal activity; minor signs or symptoms of disease.  [Maximal Pain Intensity: 0/10] : 0/10

## 2019-08-22 NOTE — REASON FOR VISIT
[Routine Follow-Up] : routine follow-up visit for [Breast Cancer] : breast cancer [Pathological -- TMN Staging] : TNM Stage: p [T: ___] : T[unfilled] [N: ___] : N[unfilled] [M: ___] : M[unfilled] [Other: _____] : [unfilled] [FreeTextEntry1] : Right Breast ER/OK + HER2 -

## 2019-08-22 NOTE — PHYSICAL EXAM
[General Appearance - Well Developed] : well developed [General Appearance - Alert] : alert [General Appearance - In No Acute Distress] : in no acute distress [Sclera] : the sclera and conjunctiva were normal [Extraocular Movements] : extraocular movements were intact [] : no respiratory distress [Respiration, Rhythm And Depth] : normal respiratory rhythm and effort [Auscultation Breath Sounds / Voice Sounds] : lungs were clear to auscultation bilaterally [Heart Rate And Rhythm] : heart rate and rhythm were normal [Heart Sounds] : normal S1 and S2 [No UE Edema] : there is no upper extremity edema [Bowel Sounds] : normal bowel sounds [Abdomen Soft] : soft [Normal] : no joint swelling, no clubbing or cyanosis of the fingernails and muscle strength and tone were normal [Oriented To Time, Place, And Person] : oriented to person, place, and time [Outer Ear] : the ears and nose were normal in appearance [Hearing Threshold Finger Rub Not [place]] : hearing was normal [Breast Palpation Mass] : no palpable masses [de-identified] : grade 1 hyperpigmentation to right breast. surgical incisions right breast and axilla well healed.  [de-identified] : grade 1 hyperpigmentation to right breast. surgical incisions right breast and axilla well healed.  [FreeTextEntry1] : BP was repeated manually-- 124/ 72.

## 2019-08-22 NOTE — REVIEW OF SYSTEMS
[Night Sweats] : night sweats [Fatigue] : fatigue [Anxiety] : anxiety [Hot Flashes] : hot flashes [Fatigue: Grade 0] : Fatigue: Grade 0 [Skin Hyperpigmentation: Grade 0] : Skin Hyperpigmentation: Grade 0 [Dermatitis Radiation: Grade 0] : Dermatitis Radiation: Grade 0 [Negative] : ENT [Visual Disturbances] : no visual disturbances [Chest Pain] : no chest pain [Palpitations] : no palpitations [Shortness Of Breath] : no shortness of breath [Abdominal Pain] : no abdominal pain [Cough] : no cough [Constipation] : no constipation [de-identified] : started on clonazepam

## 2019-08-22 NOTE — HISTORY OF PRESENT ILLNESS
[FreeTextEntry1] : Ms. Gutiérrez completed adjuvant RT for dose of 4240 cGy to the right breast from 1/16/18- 2/7/18 for a [id number], ER/OH positive, HER 2 negative breast cancer. She experienced fatigue and anxiety during treatment controlled with lorazepam.\par \par 8/21/19- FOLLOW UP \par Ms. Gutiérrez returns for routine follow up. When she was last seen on 2/6/19, she was doing well and LEO; plan was to continue follow up with María Fonseca and [facility]. She last saw Dr. Fonseca in May and will see her again in November. She last saw Dr. Kilgore in July and will see her again in November; continues to take Exemestane. As per patient, she will have her next mammogram in November before she sees Dr. Fonseca. \par \par Right mammo-sendy done 4/22/19 revealed the following:\par -Interval removal of Biozorb device. There is residual focal asymmetry, likely postoperative. Continued 6 month follow up is recommended at which time patient is due for bilateral mammo. BI-RADS 3.\par \par Today, she reports she feels generally well. Notes occasional tenderness at lumpectomy site upon palpation. She denies any other complaints to include edema, palpable masses, CP, SOB, fever, chills. \par \par \par 2/6/19- FOLLOW UP\par Ms Gutiérrez presents today for routine follow up.  She is currently taking Tamoxifen, managed by Dr. Kilgore.\par She completed a b/l mammo-sendy and Right sono on 10/16/18 which showed Post radiation and operative changes to right breast, 6 months Mammo f/u was recommended. On 11/19/2018, biozorb was removed.  She last saw Dr. Fonseca on 11/28/18 with plan to follow up 6 months. \par \par Today, she reports she is feeling well. Denies breast pain, palpable masses, breast or UE edema, or any other c/o with the exception of hot flashes. \par \par 8/1/18- Ms. Gutiérrez presents today for routine follow up. \par She saw Dr. Fonseca in April 2018. She underwent mammogram on 4/19/18, which revealed post operative radiation changes in the righ tbresat. Ultrasound in the right breast was benign. She was noted to be due for left breast mammogram/ultrasound. \par Today she is feeling well. Recently switched from anastrozole to tamoxifen due to sweating. In a great mood. Not as bothered by the biozorb. \par \par \par ONCOLOGY HISTORY\par Ms. Hali Gutiérrez presented initially for consideration for radiation therapy on 11/27/17\par \par Her relevant medical history dates back to diagnostic mammogram and sonogram on 8/30/17.  Results of this revealed a 1.5 cm irregular mass in the right breast, 1:00 axis, 9 FN. Ultrasound guided biopsy was recommended.  Core biopsy was done 9/19/17. Pathology showed invasive ductal carcinoma, ER+, [place] +, HER2 negaitve. This was reviewed at [facility]. \par \par On 10/30/17 she underwent a right breast lumpectomy.  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 Curt 3/9. 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 No LVI present margins negative, closest margins superior and inferior at 0.6 cm, remaining margins >1 cm. 1 right sentinel lymph node negative for carcinoma. ER+, OH +, HER 2 negative. \par \par After surgery, she has required drainage of seromas in both incision sites, lateral and medial. At the time of consult she noted a re-accumulation of fluid in the lateral site. She had a good deal of pain still in her right axilla.  She additionally had difficulty raising her right arm above her head.  Since stopping HRT after diagnosis, she notes she had a lot of hot flashes. She woke up after sleeping soaked in sweat.  She had no evidence of lymphedema.  She denied fevers, chills, or night sweats.  \par \par 3/6/18- PTE\par Ms. Gutiérrez  completed adjuvant RT to the right breast  for T1c N0 M0, ER/OH positive, HER 2 negative breast cancer from 1/16/18-2/7/18. She experienced fatigue and anxiety during treatment controlled with lorazepam. Saw Dr. Kilgore 2 weeks ago and started on Anastrazole.\par Since last visit has been having increased anxiety. Has appt. with psychiatrist  3/8/18.  \par Pt. reports:\par -feeling fatigued. has very little energy\par -has intermittent sharp right breast pain 8-9/10.  Denies chest pain.  Takes ibuprofen prn with relief\par -right breast skin intact with skin  darkening and lightened area under axilla

## 2019-08-30 ENCOUNTER — APPOINTMENT (OUTPATIENT)
Dept: PSYCHIATRY | Facility: CLINIC | Age: 69
End: 2019-08-30
Payer: MEDICARE

## 2019-08-30 PROCEDURE — 99214 OFFICE O/P EST MOD 30 MIN: CPT

## 2019-09-13 ENCOUNTER — APPOINTMENT (OUTPATIENT)
Dept: PSYCHIATRY | Facility: CLINIC | Age: 69
End: 2019-09-13

## 2019-09-24 ENCOUNTER — APPOINTMENT (OUTPATIENT)
Dept: PSYCHIATRY | Facility: CLINIC | Age: 69
End: 2019-09-24
Payer: MEDICARE

## 2019-09-24 PROCEDURE — 99214 OFFICE O/P EST MOD 30 MIN: CPT

## 2019-10-01 ENCOUNTER — OUTPATIENT (OUTPATIENT)
Dept: OUTPATIENT SERVICES | Facility: HOSPITAL | Age: 69
LOS: 1 days | Discharge: HOME | End: 2019-10-01
Payer: MEDICARE

## 2019-10-01 DIAGNOSIS — M79.673 PAIN IN UNSPECIFIED FOOT: Chronic | ICD-10-CM

## 2019-10-01 DIAGNOSIS — K42.9 UMBILICAL HERNIA WITHOUT OBSTRUCTION OR GANGRENE: Chronic | ICD-10-CM

## 2019-10-01 DIAGNOSIS — H57.9 UNSPECIFIED DISORDER OF EYE AND ADNEXA: Chronic | ICD-10-CM

## 2019-10-01 DIAGNOSIS — Z98.890 OTHER SPECIFIED POSTPROCEDURAL STATES: Chronic | ICD-10-CM

## 2019-10-01 DIAGNOSIS — H72.92 UNSPECIFIED PERFORATION OF TYMPANIC MEMBRANE, LEFT EAR: Chronic | ICD-10-CM

## 2019-10-04 ENCOUNTER — APPOINTMENT (OUTPATIENT)
Dept: PSYCHIATRY | Facility: CLINIC | Age: 69
End: 2019-10-04

## 2019-10-25 ENCOUNTER — APPOINTMENT (OUTPATIENT)
Dept: PSYCHIATRY | Facility: CLINIC | Age: 69
End: 2019-10-25

## 2019-10-25 PROCEDURE — 99214 OFFICE O/P EST MOD 30 MIN: CPT

## 2019-10-28 DIAGNOSIS — F34.0 CYCLOTHYMIC DISORDER: ICD-10-CM

## 2019-10-28 DIAGNOSIS — F34.1 DYSTHYMIC DISORDER: ICD-10-CM

## 2019-10-29 ENCOUNTER — APPOINTMENT (OUTPATIENT)
Dept: PSYCHIATRY | Facility: CLINIC | Age: 69
End: 2019-10-29

## 2019-10-29 PROCEDURE — 99214 OFFICE O/P EST MOD 30 MIN: CPT

## 2019-11-04 ENCOUNTER — OUTPATIENT (OUTPATIENT)
Dept: OUTPATIENT SERVICES | Facility: HOSPITAL | Age: 69
LOS: 1 days | End: 2019-11-04
Payer: MEDICARE

## 2019-11-04 ENCOUNTER — APPOINTMENT (OUTPATIENT)
Dept: ULTRASOUND IMAGING | Facility: HOSPITAL | Age: 69
End: 2019-11-04

## 2019-11-04 ENCOUNTER — APPOINTMENT (OUTPATIENT)
Dept: MAMMOGRAPHY | Facility: HOSPITAL | Age: 69
End: 2019-11-04

## 2019-11-04 ENCOUNTER — APPOINTMENT (OUTPATIENT)
Dept: MRI IMAGING | Facility: HOSPITAL | Age: 69
End: 2019-11-04

## 2019-11-04 DIAGNOSIS — M79.673 PAIN IN UNSPECIFIED FOOT: Chronic | ICD-10-CM

## 2019-11-04 DIAGNOSIS — K42.9 UMBILICAL HERNIA WITHOUT OBSTRUCTION OR GANGRENE: Chronic | ICD-10-CM

## 2019-11-04 DIAGNOSIS — H72.92 UNSPECIFIED PERFORATION OF TYMPANIC MEMBRANE, LEFT EAR: Chronic | ICD-10-CM

## 2019-11-04 DIAGNOSIS — Z98.890 OTHER SPECIFIED POSTPROCEDURAL STATES: Chronic | ICD-10-CM

## 2019-11-04 DIAGNOSIS — H57.9 UNSPECIFIED DISORDER OF EYE AND ADNEXA: Chronic | ICD-10-CM

## 2019-11-04 PROCEDURE — 76641 ULTRASOUND BREAST COMPLETE: CPT | Mod: 26,50

## 2019-11-04 PROCEDURE — 76641 ULTRASOUND BREAST COMPLETE: CPT

## 2019-11-04 PROCEDURE — 77066 DX MAMMO INCL CAD BI: CPT | Mod: 26

## 2019-11-04 PROCEDURE — G0279: CPT

## 2019-11-04 PROCEDURE — G0279: CPT | Mod: 26

## 2019-11-04 PROCEDURE — 77066 DX MAMMO INCL CAD BI: CPT

## 2019-11-04 PROCEDURE — 76536 US EXAM OF HEAD AND NECK: CPT | Mod: 26

## 2019-11-04 PROCEDURE — 76536 US EXAM OF HEAD AND NECK: CPT

## 2019-11-20 ENCOUNTER — APPOINTMENT (OUTPATIENT)
Dept: PSYCHIATRY | Facility: CLINIC | Age: 69
End: 2019-11-20

## 2019-11-20 PROCEDURE — 99214 OFFICE O/P EST MOD 30 MIN: CPT

## 2019-12-03 ENCOUNTER — APPOINTMENT (OUTPATIENT)
Dept: PSYCHIATRY | Facility: CLINIC | Age: 69
End: 2019-12-03

## 2019-12-03 PROCEDURE — 99213 OFFICE O/P EST LOW 20 MIN: CPT

## 2019-12-16 ENCOUNTER — APPOINTMENT (OUTPATIENT)
Dept: PSYCHIATRY | Facility: CLINIC | Age: 69
End: 2019-12-16

## 2020-01-09 ENCOUNTER — APPOINTMENT (OUTPATIENT)
Dept: PSYCHIATRY | Facility: CLINIC | Age: 70
End: 2020-01-09

## 2020-01-09 PROCEDURE — 99215 OFFICE O/P EST HI 40 MIN: CPT

## 2020-01-09 RX ORDER — BUPROPION HYDROCHLORIDE 150 MG/1
1 TABLET, EXTENDED RELEASE ORAL
Qty: 30 | Refills: 4
Start: 2020-01-09 | End: 2020-06-06

## 2020-01-09 RX ORDER — BUPROPION HYDROCHLORIDE 150 MG/1
1 TABLET, EXTENDED RELEASE ORAL
Qty: 60 | Refills: 4
Start: 2020-01-09 | End: 2020-06-06

## 2020-01-09 RX ORDER — BUPROPION HYDROCHLORIDE 150 MG/1
1 TABLET, EXTENDED RELEASE ORAL
Qty: 60 | Refills: 2
Start: 2020-01-09 | End: 2020-04-07

## 2020-01-09 RX ORDER — GABAPENTIN 400 MG/1
2 CAPSULE ORAL
Qty: 60 | Refills: 4
Start: 2020-01-09 | End: 2020-06-06

## 2020-01-09 RX ORDER — ZOLPIDEM TARTRATE 10 MG/1
1 TABLET ORAL
Qty: 30 | Refills: 2
Start: 2020-01-09 | End: 2020-04-07

## 2020-01-11 ENCOUNTER — FORM ENCOUNTER (OUTPATIENT)
Age: 70
End: 2020-01-11

## 2020-02-12 ENCOUNTER — APPOINTMENT (OUTPATIENT)
Dept: PSYCHIATRY | Facility: CLINIC | Age: 70
End: 2020-02-12

## 2020-02-12 PROCEDURE — 99214 OFFICE O/P EST MOD 30 MIN: CPT

## 2020-02-19 ENCOUNTER — APPOINTMENT (OUTPATIENT)
Dept: RADIATION ONCOLOGY | Facility: CLINIC | Age: 70
End: 2020-02-19
Payer: MEDICARE

## 2020-02-19 PROCEDURE — 99212 OFFICE O/P EST SF 10 MIN: CPT

## 2020-02-19 NOTE — PHYSICAL EXAM
[General Appearance - Well Developed] : well developed [General Appearance - Alert] : alert [General Appearance - In No Acute Distress] : in no acute distress [Sclera] : the sclera and conjunctiva were normal [Extraocular Movements] : extraocular movements were intact [Outer Ear] : the ears and nose were normal in appearance [] : no respiratory distress [Hearing Threshold Finger Rub Not [place]] : hearing was normal [Respiration, Rhythm And Depth] : normal respiratory rhythm and effort [Auscultation Breath Sounds / Voice Sounds] : lungs were clear to auscultation bilaterally [Heart Rate And Rhythm] : heart rate and rhythm were normal [Heart Sounds] : normal S1 and S2 [Breast Palpation Mass] : no palpable masses [No UE Edema] : there is no upper extremity edema [Bowel Sounds] : normal bowel sounds [Abdomen Soft] : soft [Normal] : no joint swelling, no clubbing or cyanosis of the fingernails and muscle strength and tone were normal [Oriented To Time, Place, And Person] : oriented to person, place, and time [de-identified] : grade 1 hyperpigmentation to right breast. surgical incisions right breast and axilla well healed.  [de-identified] : grade 1 hyperpigmentation to right breast. surgical incisions right breast and axilla well healed.   At the lumpectomy incision site, there is tenderness with palpation where the BIOZORB was removed.  There are no suspicious findings.  The left breast is negative.

## 2020-02-19 NOTE — HISTORY OF PRESENT ILLNESS
[FreeTextEntry1] : Ms. Gutiérrez completed adjuvant RT for a dose of 4240 cGy to the right breast from 1/16/18- 2/7/18 for a [id number], ER/ID positive, HER 2 negative breast cancer. She experienced fatigue and anxiety during treatment controlled with lorazepam. She is taking Exemestane under the care of Dr. Kilgore. \par \par 2/19/2020- FOLLOW UP\par Ms. Gutiérrez returns for routine follow up. When she was last seen on 8/21/19, she was doing well and LEO. She last followed up with Dr. Kilgore approx 5- 6 months ago. She last followed up with Dr. Fonseca approx six months ago and will follow up in May 2020.\par \par Bilateral mammo/ sono done 11/4/19 revealed the following: \par -Benign mammographic findings without interval change. Unremarkable bilateral breast ultrasound. Routine screening is recommended. BI-RADS 2.\par \par Today, she reports she feels generally well. Notes occasional soreness/ tenderness to right lateral breast. Denies any further complaints to include edema, CP, SOB, fever, chills. \par \par ______________________\par 8/21/19- FOLLOW UP \par Ms. Gutiérrez returns for routine follow up. When she was last seen on 2/6/19, she was doing well and LEO; plan was to continue follow up with María Fonseca and [facility]. She last saw Dr. Fonseca in May and will see her again in November. She last saw Dr. Kilgore in July and will see her again in November; continues to take Exemestane. As per patient, she will have her next mammogram in November before she sees Dr. Fonseca. \par \par Right mammo-sendy done 4/22/19 revealed the following:\par -Interval removal of Biozorb device. There is residual focal asymmetry, likely postoperative. Continued 6 month follow up is recommended at which time patient is due for bilateral mammo. BI-RADS 3.\par \par Today, she reports she feels generally well. Notes occasional tenderness at lumpectomy site upon palpation. She denies any other complaints to include edema, palpable masses, CP, SOB, fever, chills. \par \par _________________\par 2/6/19- FOLLOW UP\tabby Gutiérrez presents today for routine follow up.  She is currently taking Tamoxifen, managed by Dr. Kilgore.\par She completed a b/l mammo-sendy and Right sono on 10/16/18 which showed Post radiation and operative changes to right breast, 6 months Mammo f/u was recommended. On 11/19/2018, biozorb was removed.  She last saw Dr. Fonseca on 11/28/18 with plan to follow up 6 months. \par \par Today, she reports she is feeling well. Denies breast pain, palpable masses, breast or UE edema, or any other c/o with the exception of hot flashes. \par \par 8/1/18- Ms. Gutiérrez presents today for routine follow up. \par She saw Dr. Fonseca in April 2018. She underwent mammogram on 4/19/18, which revealed post operative radiation changes in the righ tbresat. Ultrasound in the right breast was benign. She was noted to be due for left breast mammogram/ultrasound. \par Today she is feeling well. Recently switched from anastrozole to tamoxifen due to sweating. In a great mood. Not as bothered by the biozorb. \par \par \par ONCOLOGY HISTORY\par Ms. Hali Gutiérrez presented initially for consideration for radiation therapy on 11/27/17\par \par Her relevant medical history dates back to diagnostic mammogram and sonogram on 8/30/17.  Results of this revealed a 1.5 cm irregular mass in the right breast, 1:00 axis, 9 FN. Ultrasound guided biopsy was recommended.  Core biopsy was done 9/19/17. Pathology showed invasive ductal carcinoma, ER+, ID +, HER2 negaitve. This was reviewed at [facility]. \par \par On 10/30/17 she underwent a right breast lumpectomy.  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 Curt 3/9. 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 No LVI present margins negative, closest margins superior and inferior at 0.6 cm, remaining margins >1 cm. 1 right sentinel lymph node negative for carcinoma. ER+, ID +, HER 2 negative. \par \par After surgery, she has required drainage of seromas in both incision sites, lateral and medial. At the time of consult she noted a re-accumulation of fluid in the lateral site. She had a good deal of pain still in her right axilla.  She additionally had difficulty raising her right arm above her head.  Since stopping HRT after diagnosis, she notes she had a lot of hot flashes. She woke up after sleeping soaked in sweat.  She had no evidence of lymphedema.  She denied fevers, chills, or night sweats.  \par \par 3/6/18- PTE\par Ms. Gutiérrez  completed adjuvant RT to the right breast  for T1c N0 M0, ER/ID positive, HER 2 negative breast cancer from 1/16/18-2/7/18. She experienced fatigue and anxiety during treatment controlled with lorazepam. Saw Dr. Kilgore 2 weeks ago and started on Anastrazole.\par Since last visit has been having increased anxiety. Has appt. with psychiatrist  3/8/18.  \par Pt. reports:\par -feeling fatigued. has very little energy\par -has intermittent sharp right breast pain 8-9/10.  Denies chest pain.  Takes ibuprofen prn with relief\par -right breast skin intact with skin  darkening and lightened area under axilla

## 2020-02-19 NOTE — REVIEW OF SYSTEMS
[Night Sweats] : night sweats [Fatigue] : fatigue [Anxiety] : anxiety [Hot Flashes] : hot flashes [Negative] : Psychiatric [Fatigue: Grade 0] : Fatigue: Grade 0 [Dermatitis Radiation: Grade 0] : Dermatitis Radiation: Grade 0 [Visual Disturbances] : no visual disturbances [Chest Pain] : no chest pain [Palpitations] : no palpitations [Shortness Of Breath] : no shortness of breath [Cough] : no cough [Abdominal Pain] : no abdominal pain [Constipation] : no constipation [de-identified] : started on clonazepam

## 2020-02-26 ENCOUNTER — EMERGENCY (EMERGENCY)
Facility: HOSPITAL | Age: 70
LOS: 1 days | Discharge: HOME | End: 2020-02-26
Attending: EMERGENCY MEDICINE | Admitting: EMERGENCY MEDICINE
Payer: MEDICARE

## 2020-02-26 ENCOUNTER — APPOINTMENT (OUTPATIENT)
Dept: PSYCHIATRY | Facility: CLINIC | Age: 70
End: 2020-02-26

## 2020-02-26 VITALS
DIASTOLIC BLOOD PRESSURE: 94 MMHG | WEIGHT: 171.96 LBS | OXYGEN SATURATION: 100 % | SYSTOLIC BLOOD PRESSURE: 138 MMHG | HEIGHT: 69 IN | RESPIRATION RATE: 18 BRPM | TEMPERATURE: 98 F | HEART RATE: 87 BPM

## 2020-02-26 VITALS
OXYGEN SATURATION: 100 % | HEART RATE: 80 BPM | TEMPERATURE: 98 F | DIASTOLIC BLOOD PRESSURE: 80 MMHG | RESPIRATION RATE: 18 BRPM | SYSTOLIC BLOOD PRESSURE: 165 MMHG

## 2020-02-26 DIAGNOSIS — H57.9 UNSPECIFIED DISORDER OF EYE AND ADNEXA: Chronic | ICD-10-CM

## 2020-02-26 DIAGNOSIS — K42.9 UMBILICAL HERNIA WITHOUT OBSTRUCTION OR GANGRENE: Chronic | ICD-10-CM

## 2020-02-26 DIAGNOSIS — Z98.890 OTHER SPECIFIED POSTPROCEDURAL STATES: Chronic | ICD-10-CM

## 2020-02-26 DIAGNOSIS — M79.673 PAIN IN UNSPECIFIED FOOT: Chronic | ICD-10-CM

## 2020-02-26 DIAGNOSIS — H72.92 UNSPECIFIED PERFORATION OF TYMPANIC MEMBRANE, LEFT EAR: Chronic | ICD-10-CM

## 2020-02-26 LAB
ALBUMIN SERPL ELPH-MCNC: 4.3 G/DL — SIGNIFICANT CHANGE UP (ref 3.3–5)
ALP SERPL-CCNC: SIGNIFICANT CHANGE UP U/L (ref 40–120)
ALT FLD-CCNC: SIGNIFICANT CHANGE UP U/L (ref 10–45)
ANION GAP SERPL CALC-SCNC: 12 MMOL/L — SIGNIFICANT CHANGE UP (ref 5–17)
AST SERPL-CCNC: SIGNIFICANT CHANGE UP U/L (ref 10–40)
BILIRUB SERPL-MCNC: 0.4 MG/DL — SIGNIFICANT CHANGE UP (ref 0.2–1.2)
BUN SERPL-MCNC: 12 MG/DL — SIGNIFICANT CHANGE UP (ref 7–23)
CALCIUM SERPL-MCNC: 10.5 MG/DL — SIGNIFICANT CHANGE UP (ref 8.4–10.5)
CHLORIDE SERPL-SCNC: 105 MMOL/L — SIGNIFICANT CHANGE UP (ref 96–108)
CO2 SERPL-SCNC: 24 MMOL/L — SIGNIFICANT CHANGE UP (ref 22–31)
CREAT SERPL-MCNC: 0.8 MG/DL — SIGNIFICANT CHANGE UP (ref 0.5–1.3)
GLUCOSE SERPL-MCNC: 106 MG/DL — HIGH (ref 70–99)
POTASSIUM SERPL-MCNC: SIGNIFICANT CHANGE UP MMOL/L (ref 3.5–5.3)
POTASSIUM SERPL-SCNC: SIGNIFICANT CHANGE UP MMOL/L (ref 3.5–5.3)
PROT SERPL-MCNC: 7.6 G/DL — SIGNIFICANT CHANGE UP (ref 6–8.3)
SODIUM SERPL-SCNC: 141 MMOL/L — SIGNIFICANT CHANGE UP (ref 135–145)

## 2020-02-26 PROCEDURE — 85025 COMPLETE CBC W/AUTO DIFF WBC: CPT

## 2020-02-26 PROCEDURE — 93010 ELECTROCARDIOGRAM REPORT: CPT

## 2020-02-26 PROCEDURE — 70450 CT HEAD/BRAIN W/O DYE: CPT | Mod: 26

## 2020-02-26 PROCEDURE — 71045 X-RAY EXAM CHEST 1 VIEW: CPT | Mod: 26

## 2020-02-26 PROCEDURE — 85610 PROTHROMBIN TIME: CPT

## 2020-02-26 PROCEDURE — 84484 ASSAY OF TROPONIN QUANT: CPT

## 2020-02-26 PROCEDURE — 36415 COLL VENOUS BLD VENIPUNCTURE: CPT

## 2020-02-26 PROCEDURE — 70450 CT HEAD/BRAIN W/O DYE: CPT

## 2020-02-26 PROCEDURE — 99284 EMERGENCY DEPT VISIT MOD MDM: CPT | Mod: 25

## 2020-02-26 PROCEDURE — 96374 THER/PROPH/DIAG INJ IV PUSH: CPT

## 2020-02-26 PROCEDURE — 80053 COMPREHEN METABOLIC PANEL: CPT

## 2020-02-26 PROCEDURE — 93005 ELECTROCARDIOGRAM TRACING: CPT

## 2020-02-26 PROCEDURE — 71045 X-RAY EXAM CHEST 1 VIEW: CPT

## 2020-02-26 PROCEDURE — 99285 EMERGENCY DEPT VISIT HI MDM: CPT

## 2020-02-26 PROCEDURE — 85730 THROMBOPLASTIN TIME PARTIAL: CPT

## 2020-02-26 RX ORDER — ONDANSETRON 8 MG/1
4 TABLET, FILM COATED ORAL ONCE
Refills: 0 | Status: COMPLETED | OUTPATIENT
Start: 2020-02-26 | End: 2020-02-26

## 2020-02-26 RX ORDER — SODIUM CHLORIDE 9 MG/ML
1000 INJECTION INTRAMUSCULAR; INTRAVENOUS; SUBCUTANEOUS ONCE
Refills: 0 | Status: COMPLETED | OUTPATIENT
Start: 2020-02-26 | End: 2020-02-26

## 2020-02-26 RX ORDER — ONDANSETRON 8 MG/1
1 TABLET, FILM COATED ORAL
Qty: 10 | Refills: 0
Start: 2020-02-26

## 2020-02-26 RX ADMIN — ONDANSETRON 4 MILLIGRAM(S): 8 TABLET, FILM COATED ORAL at 13:30

## 2020-02-26 RX ADMIN — SODIUM CHLORIDE 1000 MILLILITER(S): 9 INJECTION INTRAMUSCULAR; INTRAVENOUS; SUBCUTANEOUS at 13:31

## 2020-02-26 NOTE — ED PROVIDER NOTE - OBJECTIVE STATEMENT
69 year old female with history of vertigo, HTN presents to ED with concern for dizziness intermittently x 4 days. Patient notes symptoms come in waves and dizziness is accompanied with nausea and feeling of gait instability.  She notes history of similar symptoms many years ago and was diagnosed with vertigo at that time.  She called her PCP at symptom onset and was prescribed Antivert, which she took for the first time yesterday evening.  She took another dose in ED waiting room today and is currently feeling asymptomatic.  She denies associated fever, chills, chest pain, shortness of breath, visual changes, emesis, abdominal pain, peripheral edema, rashes, recent travel, sick contacts or any additional acute complaints or concerns at this time.

## 2020-02-26 NOTE — ED ADULT NURSE NOTE - ALCOHOL PRE SCREEN (AUDIT - C)
Statement Selected Attending Rodriguez: Gen: NAD, heent: atrauamtic, eomi, perrla, mmm, op pink, uvula midline, neck; nttp, no nuchal rigidity, chest: nttp, no crepitus, cv: rrr, no murmurs, lungs: ctab, abd: soft, nontender, nondistended, no peritoneal signs, +BS, no guarding, ext: wwp, neg homans, skin: no rash, neuro: awake and alert, following commands, speech clear, sensation and strength intact, no focal deficits

## 2020-02-26 NOTE — ED PROVIDER NOTE - PATIENT PORTAL LINK FT
You can access the FollowMyHealth Patient Portal offered by [facility] by registering at the following website: [email or web address]. By joining [employer]’s FollowMyHealth portal, you will also be able to view your health information using other applications (apps) compatible with our system.

## 2020-02-26 NOTE — ED ADULT NURSE NOTE - NSIMPLEMENTINTERV_GEN_ALL_ED
Implemented All Fall Risk Interventions:  Calvin to call system. Call bell, personal items and telephone within reach. Instruct patient to call for assistance. Room bathroom lighting operational. Non-slip footwear when patient is off stretcher. Physically safe environment: no spills, clutter or unnecessary equipment. Stretcher in lowest position, wheels locked, appropriate side rails in place. Provide visual cue, wrist band, yellow gown, etc. Monitor gait and stability. Monitor for mental status changes and reorient to person, place, and time. Review medications for side effects contributing to fall risk. Reinforce activity limits and safety measures with patient and family.

## 2020-02-26 NOTE — ED ADULT TRIAGE NOTE - CHIEF COMPLAINT QUOTE
patient complains of dizziness since sunday night. room spinning and worse when she is on her right side. hx of vertigo and states that this feels the same. patient was prescribed meclizine which she took while in the waiting room PTA today. denies blurry vision, chest pain, sob.

## 2020-02-26 NOTE — ED PROVIDER NOTE - CLINICAL SUMMARY MEDICAL DECISION MAKING FREE TEXT BOX
Patient in ED w concern for vertigo.  Notes dizziness intermittently since yesterday.  PCP sent antivert to pharmacy, and patient notes she took one in waiting room and is currently asymptomatic.  Patient is without focal neuro deficit on exam.  CT head completed and unremarkable for acute process.  Patient is given IVF bolus and monitored in ED.  She remains asymptomatic and is requesting discharge.  She is given neuro follow up info and encouraged to continue antivert as prescribed.  She is also given rx for zofran and instructed to follow up promptly.  Patient is advised to follow up with neurologist as well as their PCP in 1-2 days without fail.  Patient instructed to return to ED immediately should their symptoms worsen or if there is any concern prior to the recommended PCP follow up.  Patient is aware of plan and verbalizes their understanding.  Will discharge home at this time.

## 2020-02-26 NOTE — ED ADULT NURSE NOTE - CHPI ED NUR SYMPTOMS NEG
no change in level of consciousness/no loss of consciousness/no confusion/no nausea/no numbness/no weakness/no vomiting/no fever/no blurred vision

## 2020-02-26 NOTE — ED PROVIDER NOTE - NSFOLLOWUPINSTRUCTIONS_ED_ALL_ED_FT
Please follow up with your primary physician in 1-2 days for re evaluation.  Please return to ER immediately should your symptoms worsen or if you have any concern prior to this recommended follow up.    Vertigo    WHAT YOU NEED TO KNOW:    Vertigo is a condition that causes you to feel dizzy. You may feel that you or everything around you is moving or spinning. You may also feel like you are being pulled down or toward your side.     DISCHARGE INSTRUCTIONS:    Return to the emergency department if:     You have a headache and a stiff neck.      You have shaking chills and a fever.       You vomit over and over with no relief.       You have blood, pus, or fluid coming out of your ears.      You are confused.     Contact your healthcare provider if:     Your symptoms do not get better with treatment.       You have questions about your condition or care.    Medicines:     Medicine may be given to help relieve your symptoms.      Take your medicine as directed. Contact your healthcare provider if you think your medicine is not helping or if you have side effects. Tell him or her if you are allergic to any medicine. Keep a list of the medicines, vitamins, and herbs you take. Include the amounts, and when and why you take them. Bring the list or the pill bottles to follow-up visits. Carry your medicine list with you in case of an emergency.    Manage your symptoms:     Do not drive, walk without help, or operate heavy machinery when you are dizzy.       Move slowly when you move from one position to another position. Get up slowly from sitting or lying down. Sit or lie down right away if you feel dizzy.      Drink plenty of liquids. Liquids help prevent dehydration. Ask how much liquid to drink each day and which liquids are best for you.      Vestibular and balance rehabilitation therapy (VBRT) is used to teach you exercises to improve your balance and strength. These exercises may help decrease your vertigo and improve your balance. Ask for more information about this therapy.    Follow up with your healthcare provider as directed: Write down your questions so you remember to ask them during your visits.        © Copyright [employer] 2020       back to top                      © Copyright [employer] 2020

## 2020-02-26 NOTE — ED PROVIDER NOTE - PHYSICAL EXAMINATION
VITAL SIGNS: I have reviewed nursing notes and confirm.  CONSTITUTIONAL: Well-developed; well-nourished; in no acute distress.   SKIN:  warm and dry, no acute rash.   HEAD:  normocephalic, atraumatic.  EYES: PERRL.  EOM intact; conjunctiva and sclera clear.  ENT: No nasal discharge; airway clear.   NECK: Supple; non tender.  CARD: S1, S2 normal; no murmurs, gallops, or rubs. Regular rate and rhythm.   RESP:  Clear to auscultation b/l, no wheezes, rales or rhonchi.  ABD: Normal bowel sounds; soft; non-distended; non-tender; no guarding/ rebound.  EXT: Normal ROM. No clubbing, cyanosis or edema. 2+ pulses to b/l ue/le.  NEURO: Alert, oriented, grossly unremarkable.  5/5 strength x 4 extremities against gravity and external force.  No drift x 4 extremities.  Sensation intact and symmetric x 4 extremities.  No facial asymmetry.    PSYCH: Cooperative, mood and affect appropriate.

## 2020-02-26 NOTE — ED PROVIDER NOTE - NS ED ROS FT
Constitutional: No fever or chills.   Eyes: No pain, blurry vision, or discharge.  ENMT: No hearing changes, pain, discharge or infections. No neck pain or stiffness.  Cardiac: No chest pain, SOB or edema. No chest pain with exertion.  Respiratory: No cough or respiratory distress. No hemoptysis. No history of asthma or RAD.  GI: + nausea, no vomiting, diarrhea or abdominal pain.  : No dysuria, frequency or burning.  MS: No myalgia, muscle weakness, joint pain or back pain.  Neuro: + Dizziness.  No headache or weakness. No LOC.  Skin: No skin rash.   Endocrine: No history of thyroid disease or diabetes.  Except as documented in the HPI, all other systems are negative.

## 2020-02-26 NOTE — ED PROVIDER NOTE - CARE PROVIDER_API CALL
Vinita Flores)  Neurology; Vascular Neurology  [address], [address] [place] [address]  Phone: [phone]  Fax: [phone]  Follow Up Time:

## 2020-03-01 ENCOUNTER — OUTPATIENT (OUTPATIENT)
Dept: OUTPATIENT SERVICES | Facility: HOSPITAL | Age: 70
LOS: 1 days | End: 2020-03-01
Payer: MEDICARE

## 2020-03-01 DIAGNOSIS — Z88.8 ALLERGY STATUS TO OTHER DRUGS, MEDICAMENTS AND BIOLOGICAL SUBSTANCES: ICD-10-CM

## 2020-03-01 DIAGNOSIS — R11.0 NAUSEA: ICD-10-CM

## 2020-03-01 DIAGNOSIS — H57.9 UNSPECIFIED DISORDER OF EYE AND ADNEXA: Chronic | ICD-10-CM

## 2020-03-01 DIAGNOSIS — M79.673 PAIN IN UNSPECIFIED FOOT: Chronic | ICD-10-CM

## 2020-03-01 DIAGNOSIS — R42 DIZZINESS AND GIDDINESS: ICD-10-CM

## 2020-03-01 DIAGNOSIS — Z88.1 ALLERGY STATUS TO OTHER ANTIBIOTIC AGENTS STATUS: ICD-10-CM

## 2020-03-01 DIAGNOSIS — K42.9 UMBILICAL HERNIA WITHOUT OBSTRUCTION OR GANGRENE: Chronic | ICD-10-CM

## 2020-03-01 DIAGNOSIS — Z98.890 OTHER SPECIFIED POSTPROCEDURAL STATES: Chronic | ICD-10-CM

## 2020-03-01 DIAGNOSIS — Z88.0 ALLERGY STATUS TO PENICILLIN: ICD-10-CM

## 2020-03-01 DIAGNOSIS — Z91.013 ALLERGY TO SEAFOOD: ICD-10-CM

## 2020-03-01 DIAGNOSIS — H72.92 UNSPECIFIED PERFORATION OF TYMPANIC MEMBRANE, LEFT EAR: Chronic | ICD-10-CM

## 2020-03-01 PROCEDURE — G9001: CPT

## 2020-04-01 ENCOUNTER — FORM ENCOUNTER (OUTPATIENT)
Age: 70
End: 2020-04-01

## 2020-04-01 ENCOUNTER — OUTPATIENT (OUTPATIENT)
Dept: OUTPATIENT SERVICES | Facility: HOSPITAL | Age: 70
LOS: 1 days | Discharge: HOME | End: 2020-04-01
Payer: MEDICARE

## 2020-04-01 DIAGNOSIS — K42.9 UMBILICAL HERNIA WITHOUT OBSTRUCTION OR GANGRENE: Chronic | ICD-10-CM

## 2020-04-01 DIAGNOSIS — Z98.890 OTHER SPECIFIED POSTPROCEDURAL STATES: Chronic | ICD-10-CM

## 2020-04-01 DIAGNOSIS — H72.92 UNSPECIFIED PERFORATION OF TYMPANIC MEMBRANE, LEFT EAR: Chronic | ICD-10-CM

## 2020-04-01 DIAGNOSIS — M79.673 PAIN IN UNSPECIFIED FOOT: Chronic | ICD-10-CM

## 2020-04-01 DIAGNOSIS — H57.9 UNSPECIFIED DISORDER OF EYE AND ADNEXA: Chronic | ICD-10-CM

## 2020-04-02 ENCOUNTER — APPOINTMENT (OUTPATIENT)
Dept: PSYCHIATRY | Facility: CLINIC | Age: 70
End: 2020-04-02

## 2020-04-02 PROCEDURE — 99213 OFFICE O/P EST LOW 20 MIN: CPT | Mod: 95

## 2020-04-09 DIAGNOSIS — Z71.89 OTHER SPECIFIED COUNSELING: ICD-10-CM

## 2020-04-21 DIAGNOSIS — F34.1 DYSTHYMIC DISORDER: ICD-10-CM

## 2020-04-28 DIAGNOSIS — F34.0 CYCLOTHYMIC DISORDER: ICD-10-CM

## 2020-04-29 ENCOUNTER — FORM ENCOUNTER (OUTPATIENT)
Age: 70
End: 2020-04-29

## 2020-04-30 ENCOUNTER — APPOINTMENT (OUTPATIENT)
Dept: PSYCHIATRY | Facility: CLINIC | Age: 70
End: 2020-04-30

## 2020-04-30 PROCEDURE — 99214 OFFICE O/P EST MOD 30 MIN: CPT | Mod: 95

## 2020-05-13 ENCOUNTER — APPOINTMENT (OUTPATIENT)
Dept: OTOLARYNGOLOGY | Facility: CLINIC | Age: 70
End: 2020-05-13
Payer: MEDICARE

## 2020-05-13 PROCEDURE — 92557 COMPREHENSIVE HEARING TEST: CPT

## 2020-05-13 PROCEDURE — 92550 TYMPANOMETRY & REFLEX THRESH: CPT

## 2020-05-19 ENCOUNTER — OUTPATIENT (OUTPATIENT)
Dept: OUTPATIENT SERVICES | Facility: HOSPITAL | Age: 70
LOS: 1 days | End: 2020-05-19

## 2020-05-19 ENCOUNTER — APPOINTMENT (OUTPATIENT)
Dept: MRI IMAGING | Facility: CLINIC | Age: 70
End: 2020-05-19
Payer: MEDICARE

## 2020-05-19 DIAGNOSIS — M79.673 PAIN IN UNSPECIFIED FOOT: Chronic | ICD-10-CM

## 2020-05-19 DIAGNOSIS — H72.92 UNSPECIFIED PERFORATION OF TYMPANIC MEMBRANE, LEFT EAR: Chronic | ICD-10-CM

## 2020-05-19 DIAGNOSIS — Z98.890 OTHER SPECIFIED POSTPROCEDURAL STATES: Chronic | ICD-10-CM

## 2020-05-19 DIAGNOSIS — K42.9 UMBILICAL HERNIA WITHOUT OBSTRUCTION OR GANGRENE: Chronic | ICD-10-CM

## 2020-05-19 DIAGNOSIS — H57.9 UNSPECIFIED DISORDER OF EYE AND ADNEXA: Chronic | ICD-10-CM

## 2020-05-19 PROCEDURE — 70551 MRI BRAIN STEM W/O DYE: CPT | Mod: 26

## 2020-05-27 ENCOUNTER — FORM ENCOUNTER (OUTPATIENT)
Age: 70
End: 2020-05-27

## 2020-05-28 ENCOUNTER — APPOINTMENT (OUTPATIENT)
Dept: PSYCHIATRY | Facility: CLINIC | Age: 70
End: 2020-05-28

## 2020-05-28 PROCEDURE — 99443: CPT | Mod: 95

## 2020-05-28 RX ORDER — GABAPENTIN 400 MG/1
2 CAPSULE ORAL
Qty: 60 | Refills: 4
Start: 2020-05-28 | End: 2020-10-24

## 2020-06-12 ENCOUNTER — APPOINTMENT (OUTPATIENT)
Dept: PSYCHIATRY | Facility: CLINIC | Age: 70
End: 2020-06-12

## 2020-06-12 PROCEDURE — 99443: CPT | Mod: 95

## 2020-06-15 ENCOUNTER — APPOINTMENT (OUTPATIENT)
Dept: NEUROLOGY | Facility: CLINIC | Age: 70
End: 2020-06-15
Payer: MEDICARE

## 2020-06-15 VITALS
TEMPERATURE: 97.4 F | WEIGHT: 165 LBS | SYSTOLIC BLOOD PRESSURE: 112 MMHG | BODY MASS INDEX: 24.44 KG/M2 | HEIGHT: 69 IN | OXYGEN SATURATION: 99 % | HEART RATE: 67 BPM | DIASTOLIC BLOOD PRESSURE: 75 MMHG

## 2020-06-15 DIAGNOSIS — R51 HEADACHE: ICD-10-CM

## 2020-06-15 DIAGNOSIS — Z86.59 PERSONAL HISTORY OF OTHER MENTAL AND BEHAVIORAL DISORDERS: ICD-10-CM

## 2020-06-15 DIAGNOSIS — Z86.79 PERSONAL HISTORY OF OTHER DISEASES OF THE CIRCULATORY SYSTEM: ICD-10-CM

## 2020-06-15 PROCEDURE — 99205 OFFICE O/P NEW HI 60 MIN: CPT

## 2020-06-15 RX ORDER — ESCITALOPRAM OXALATE 20 MG/1
20 TABLET ORAL
Qty: 30 | Refills: 0 | Status: DISCONTINUED | COMMUNITY
Start: 2017-09-18 | End: 2020-06-15

## 2020-06-15 NOTE — REVIEW OF SYSTEMS
[As Noted in HPI] : as noted in HPI [Vertigo] : vertigo [Tension Headache] : tension-type headaches [Negative] : Endocrine [de-identified] : tunnel vision

## 2020-06-15 NOTE — HISTORY OF PRESENT ILLNESS
[FreeTextEntry1] : left cerebellar stroke on MRI  - subacute. \par Vertigo - associated with tunnel vision, Headaches and burning sensation in the back of the head\par \par h/o HTN, Depression, Breast Cancer\par \par Vertigo is there for every day and comes and goes. increases with turning and walking since January\par headaches are daily and last 15 - 20 min. Happens several times a day.

## 2020-06-15 NOTE — ASSESSMENT
[FreeTextEntry1] : left cerebellar stroke with persistent ataxia and vertigo and frequent headaches\par R/o TA - ESR\par TTE\par MRA head and neck\par consider NORIS and Loop if above negative. \par  inflammatory labs. \par \par follow up with dr. magaña for test results  in 2 weeks

## 2020-06-15 NOTE — CONSULT LETTER
[Dear  ___] : Dear  [unfilled], [Consult Letter:] : I had the pleasure of evaluating your patient, [unfilled]. [Please see my note below.] : Please see my note below. [Consult Closing:] : Thank you very much for allowing me to participate in the care of this patient.  If you have any questions, please do not hesitate to contact me. [Sincerely,] : Sincerely, [FreeTextEntry3] : Miguel Diggs MD\par Board Certified Neurology and Vascular Neurology\par Professor of Neurology\par [facility]\par [facility]\par [facility]\par \par [email or web address]\par [phone]\par

## 2020-06-18 ENCOUNTER — APPOINTMENT (OUTPATIENT)
Dept: MRI IMAGING | Facility: HOSPITAL | Age: 70
End: 2020-06-18

## 2020-06-24 ENCOUNTER — LABORATORY RESULT (OUTPATIENT)
Age: 70
End: 2020-06-24

## 2020-06-24 ENCOUNTER — FORM ENCOUNTER (OUTPATIENT)
Age: 70
End: 2020-06-24

## 2020-06-25 ENCOUNTER — APPOINTMENT (OUTPATIENT)
Dept: PSYCHIATRY | Facility: CLINIC | Age: 70
End: 2020-06-25

## 2020-06-25 PROCEDURE — 99443: CPT | Mod: 95

## 2020-06-30 ENCOUNTER — APPOINTMENT (OUTPATIENT)
Dept: NEUROLOGY | Facility: CLINIC | Age: 70
End: 2020-06-30
Payer: MEDICARE

## 2020-06-30 PROCEDURE — 99213 OFFICE O/P EST LOW 20 MIN: CPT | Mod: 95

## 2020-06-30 RX ORDER — BUPROPION HYDROCHLORIDE 200 MG/1
200 TABLET, FILM COATED, EXTENDED RELEASE ORAL
Qty: 30 | Refills: 0 | Status: DISCONTINUED | COMMUNITY
Start: 2017-09-18 | End: 2020-06-30

## 2020-06-30 RX ORDER — BUPROPION HYDROCHLORIDE 100 MG/1
100 TABLET, FILM COATED, EXTENDED RELEASE ORAL
Qty: 30 | Refills: 0 | Status: DISCONTINUED | COMMUNITY
Start: 2017-09-18 | End: 2020-06-30

## 2020-07-01 NOTE — HISTORY OF PRESENT ILLNESS
[Home] : at home, [unfilled] , at the time of the visit. [Medical Office: ([facility])___] : at the medical office located in  [Family Member] : family member [Verbal consent obtained from patient] : the patient, [unfilled] [FreeTextEntry1] : Vertigo has improved since seeing Dr. Diggs\par Noticed certain positions only will trigger it\par Headaches have decreased- not burning sensation as severe\par Denies any active acute complaints today\par \par Would like to review lab results.\par \par Daughter would like an [facility] paperwork to be completed. \par \par Medications reviewed and takes the following:\par -exemastare 25 mg 1 tab\par -trazadone hcl 100mg 1 tab bedtime\par -ambien 5mg at bedtime\par -vitamin D3 1000\par -calcium 600mg 2 tabs\par -amlodipine 5mg \par -gabapentin 100mg 2 tabs at bedtime

## 2020-07-01 NOTE — ASSESSMENT
[FreeTextEntry1] : Repeat lab work that was not processed\par Fax over lab work from Dr. Santana (oncologist)\par ESR- WNL\par TTE and MRA head and neck to be done- pending\par as per Dr. Diggs's last visit note, consider NORIS and loop if TTE and MRA are negative\par \par FMLA paperwork to be faxed over in 7 business days\par \par f/u with Dr. Diggs

## 2020-07-01 NOTE — PHYSICAL EXAM
[FreeTextEntry1] : General: sitting in front of camera comfortably, smiling, NAD\par Neurological Exam:\par Mental Status: AA&O x3, fluent, no dysarthria, follows all commands, able to tell full history, attention intact\par Cranial nerves: EOMI,, no facial droop, tongue midline\par Motor: No pronator drift, \par Cerebellar: FNF intact b/l\par \par

## 2020-07-05 ENCOUNTER — APPOINTMENT (OUTPATIENT)
Dept: MRI IMAGING | Facility: CLINIC | Age: 70
End: 2020-07-05

## 2020-07-08 ENCOUNTER — FORM ENCOUNTER (OUTPATIENT)
Age: 70
End: 2020-07-08

## 2020-07-09 ENCOUNTER — OUTPATIENT (OUTPATIENT)
Dept: OUTPATIENT SERVICES | Facility: HOSPITAL | Age: 70
LOS: 1 days | End: 2020-07-09
Payer: MEDICARE

## 2020-07-09 DIAGNOSIS — H57.9 UNSPECIFIED DISORDER OF EYE AND ADNEXA: Chronic | ICD-10-CM

## 2020-07-09 DIAGNOSIS — K42.9 UMBILICAL HERNIA WITHOUT OBSTRUCTION OR GANGRENE: Chronic | ICD-10-CM

## 2020-07-09 DIAGNOSIS — Z98.890 OTHER SPECIFIED POSTPROCEDURAL STATES: Chronic | ICD-10-CM

## 2020-07-09 DIAGNOSIS — M79.673 PAIN IN UNSPECIFIED FOOT: Chronic | ICD-10-CM

## 2020-07-09 DIAGNOSIS — I63.119: ICD-10-CM

## 2020-07-09 DIAGNOSIS — H72.92 UNSPECIFIED PERFORATION OF TYMPANIC MEMBRANE, LEFT EAR: Chronic | ICD-10-CM

## 2020-07-09 PROCEDURE — 93306 TTE W/DOPPLER COMPLETE: CPT

## 2020-07-09 PROCEDURE — 93306 TTE W/DOPPLER COMPLETE: CPT | Mod: 26

## 2020-07-14 ENCOUNTER — APPOINTMENT (OUTPATIENT)
Dept: MRI IMAGING | Facility: CLINIC | Age: 70
End: 2020-07-14
Payer: MEDICARE

## 2020-07-14 ENCOUNTER — OUTPATIENT (OUTPATIENT)
Dept: OUTPATIENT SERVICES | Facility: HOSPITAL | Age: 70
LOS: 1 days | End: 2020-07-14

## 2020-07-14 DIAGNOSIS — H57.9 UNSPECIFIED DISORDER OF EYE AND ADNEXA: Chronic | ICD-10-CM

## 2020-07-14 DIAGNOSIS — M79.673 PAIN IN UNSPECIFIED FOOT: Chronic | ICD-10-CM

## 2020-07-14 DIAGNOSIS — K42.9 UMBILICAL HERNIA WITHOUT OBSTRUCTION OR GANGRENE: Chronic | ICD-10-CM

## 2020-07-14 DIAGNOSIS — H72.92 UNSPECIFIED PERFORATION OF TYMPANIC MEMBRANE, LEFT EAR: Chronic | ICD-10-CM

## 2020-07-14 DIAGNOSIS — Z98.890 OTHER SPECIFIED POSTPROCEDURAL STATES: Chronic | ICD-10-CM

## 2020-07-14 PROCEDURE — 70547 MR ANGIOGRAPHY NECK W/O DYE: CPT | Mod: 26

## 2020-07-14 PROCEDURE — 70544 MR ANGIOGRAPHY HEAD W/O DYE: CPT | Mod: 26

## 2020-07-15 ENCOUNTER — FORM ENCOUNTER (OUTPATIENT)
Age: 70
End: 2020-07-15

## 2020-07-16 ENCOUNTER — APPOINTMENT (OUTPATIENT)
Dept: PSYCHIATRY | Facility: CLINIC | Age: 70
End: 2020-07-16

## 2020-07-16 PROCEDURE — 99443: CPT | Mod: 95

## 2020-07-30 ENCOUNTER — APPOINTMENT (OUTPATIENT)
Dept: NEUROLOGY | Facility: CLINIC | Age: 70
End: 2020-07-30
Payer: MEDICARE

## 2020-07-30 VITALS
WEIGHT: 163 LBS | OXYGEN SATURATION: 98 % | SYSTOLIC BLOOD PRESSURE: 118 MMHG | BODY MASS INDEX: 24.14 KG/M2 | DIASTOLIC BLOOD PRESSURE: 84 MMHG | TEMPERATURE: 97.6 F | HEART RATE: 87 BPM | HEIGHT: 69 IN

## 2020-07-30 PROCEDURE — 99214 OFFICE O/P EST MOD 30 MIN: CPT

## 2020-08-05 ENCOUNTER — FORM ENCOUNTER (OUTPATIENT)
Age: 70
End: 2020-08-05

## 2020-08-06 ENCOUNTER — APPOINTMENT (OUTPATIENT)
Dept: PSYCHIATRY | Facility: CLINIC | Age: 70
End: 2020-08-06

## 2020-08-06 PROCEDURE — 99443: CPT | Mod: 95

## 2020-08-27 ENCOUNTER — APPOINTMENT (OUTPATIENT)
Dept: PSYCHIATRY | Facility: CLINIC | Age: 70
End: 2020-08-27

## 2020-09-30 ENCOUNTER — TRANSCRIPTION ENCOUNTER (OUTPATIENT)
Age: 70
End: 2020-09-30

## 2020-10-01 ENCOUNTER — OUTPATIENT (OUTPATIENT)
Dept: OUTPATIENT SERVICES | Facility: HOSPITAL | Age: 70
LOS: 1 days | Discharge: HOME | End: 2020-10-01

## 2020-10-01 DIAGNOSIS — H57.9 UNSPECIFIED DISORDER OF EYE AND ADNEXA: Chronic | ICD-10-CM

## 2020-10-01 DIAGNOSIS — M79.673 PAIN IN UNSPECIFIED FOOT: Chronic | ICD-10-CM

## 2020-10-01 DIAGNOSIS — K42.9 UMBILICAL HERNIA WITHOUT OBSTRUCTION OR GANGRENE: Chronic | ICD-10-CM

## 2020-10-01 DIAGNOSIS — H72.92 UNSPECIFIED PERFORATION OF TYMPANIC MEMBRANE, LEFT EAR: Chronic | ICD-10-CM

## 2020-10-01 DIAGNOSIS — Z98.890 OTHER SPECIFIED POSTPROCEDURAL STATES: Chronic | ICD-10-CM

## 2020-10-04 ENCOUNTER — FORM ENCOUNTER (OUTPATIENT)
Age: 70
End: 2020-10-04

## 2020-10-05 ENCOUNTER — APPOINTMENT (OUTPATIENT)
Dept: PSYCHIATRY | Facility: CLINIC | Age: 70
End: 2020-10-05

## 2020-10-05 PROCEDURE — 99215 OFFICE O/P EST HI 40 MIN: CPT | Mod: 95

## 2020-10-28 ENCOUNTER — FORM ENCOUNTER (OUTPATIENT)
Age: 70
End: 2020-10-28

## 2020-10-29 ENCOUNTER — APPOINTMENT (OUTPATIENT)
Dept: PSYCHIATRY | Facility: CLINIC | Age: 70
End: 2020-10-29

## 2020-11-18 ENCOUNTER — FORM ENCOUNTER (OUTPATIENT)
Age: 70
End: 2020-11-18

## 2020-11-19 ENCOUNTER — APPOINTMENT (OUTPATIENT)
Dept: PSYCHIATRY | Facility: CLINIC | Age: 70
End: 2020-11-19

## 2020-11-29 ENCOUNTER — FORM ENCOUNTER (OUTPATIENT)
Age: 70
End: 2020-11-29

## 2020-11-30 ENCOUNTER — APPOINTMENT (OUTPATIENT)
Dept: PSYCHIATRY | Facility: CLINIC | Age: 70
End: 2020-11-30

## 2020-12-10 ENCOUNTER — FORM ENCOUNTER (OUTPATIENT)
Age: 70
End: 2020-12-10

## 2020-12-11 ENCOUNTER — APPOINTMENT (OUTPATIENT)
Dept: PSYCHIATRY | Facility: CLINIC | Age: 70
End: 2020-12-11

## 2021-01-10 ENCOUNTER — FORM ENCOUNTER (OUTPATIENT)
Age: 71
End: 2021-01-10

## 2021-01-11 ENCOUNTER — APPOINTMENT (OUTPATIENT)
Dept: PSYCHIATRY | Facility: CLINIC | Age: 71
End: 2021-01-11

## 2021-01-13 ENCOUNTER — NON-APPOINTMENT (OUTPATIENT)
Age: 71
End: 2021-01-13

## 2021-01-13 DIAGNOSIS — Z92.3 PERSONAL HISTORY OF IRRADIATION: ICD-10-CM

## 2021-01-13 RX ORDER — EXEMESTANE 25 MG/1
25 TABLET, FILM COATED ORAL DAILY
Refills: 0 | Status: ACTIVE | COMMUNITY

## 2021-02-04 ENCOUNTER — RESULT REVIEW (OUTPATIENT)
Age: 71
End: 2021-02-04

## 2021-02-04 ENCOUNTER — APPOINTMENT (OUTPATIENT)
Dept: ULTRASOUND IMAGING | Facility: HOSPITAL | Age: 71
End: 2021-02-04
Payer: MEDICARE

## 2021-02-04 ENCOUNTER — OUTPATIENT (OUTPATIENT)
Dept: OUTPATIENT SERVICES | Facility: HOSPITAL | Age: 71
LOS: 1 days | End: 2021-02-04
Payer: MEDICARE

## 2021-02-04 ENCOUNTER — APPOINTMENT (OUTPATIENT)
Dept: MAMMOGRAPHY | Facility: HOSPITAL | Age: 71
End: 2021-02-04
Payer: MEDICARE

## 2021-02-04 DIAGNOSIS — Z98.890 OTHER SPECIFIED POSTPROCEDURAL STATES: Chronic | ICD-10-CM

## 2021-02-04 DIAGNOSIS — K42.9 UMBILICAL HERNIA WITHOUT OBSTRUCTION OR GANGRENE: Chronic | ICD-10-CM

## 2021-02-04 DIAGNOSIS — H57.9 UNSPECIFIED DISORDER OF EYE AND ADNEXA: Chronic | ICD-10-CM

## 2021-02-04 DIAGNOSIS — M79.673 PAIN IN UNSPECIFIED FOOT: Chronic | ICD-10-CM

## 2021-02-04 DIAGNOSIS — H72.92 UNSPECIFIED PERFORATION OF TYMPANIC MEMBRANE, LEFT EAR: Chronic | ICD-10-CM

## 2021-02-04 PROCEDURE — 76641 ULTRASOUND BREAST COMPLETE: CPT | Mod: 26,50

## 2021-02-04 PROCEDURE — 77063 BREAST TOMOSYNTHESIS BI: CPT

## 2021-02-04 PROCEDURE — 77067 SCR MAMMO BI INCL CAD: CPT | Mod: 26

## 2021-02-04 PROCEDURE — 76641 ULTRASOUND BREAST COMPLETE: CPT

## 2021-02-04 PROCEDURE — 77063 BREAST TOMOSYNTHESIS BI: CPT | Mod: 26

## 2021-02-04 PROCEDURE — 77067 SCR MAMMO BI INCL CAD: CPT

## 2021-02-08 ENCOUNTER — APPOINTMENT (OUTPATIENT)
Dept: PSYCHIATRY | Facility: CLINIC | Age: 71
End: 2021-02-08
Payer: MEDICARE

## 2021-02-08 PROCEDURE — 99215 OFFICE O/P EST HI 40 MIN: CPT | Mod: 95

## 2021-02-08 RX ORDER — CHOLECALCIFEROL (VITAMIN D3) 25 MCG
25 MCG TABLET ORAL DAILY
Refills: 0 | Status: ACTIVE | COMMUNITY
Start: 2021-02-08

## 2021-02-08 RX ORDER — EXEMESTANE 25 MG/1
25 TABLET, FILM COATED ORAL
Qty: 30 | Refills: 0 | Status: DISCONTINUED | COMMUNITY
Start: 2020-06-11 | End: 2021-02-08

## 2021-02-08 RX ORDER — DIVALPROEX SODIUM 250 MG/1
250 TABLET, EXTENDED RELEASE ORAL
Qty: 30 | Refills: 5 | Status: DISCONTINUED | COMMUNITY
Start: 2020-06-15 | End: 2021-02-08

## 2021-02-08 RX ORDER — ONDANSETRON 4 MG/1
4 TABLET, ORALLY DISINTEGRATING ORAL
Qty: 10 | Refills: 0 | Status: DISCONTINUED | COMMUNITY
Start: 2020-02-26 | End: 2021-02-08

## 2021-02-08 RX ORDER — GABAPENTIN 800 MG/1
TABLET, FILM COATED ORAL
Refills: 0 | Status: DISCONTINUED | COMMUNITY
End: 2021-02-08

## 2021-02-08 RX ORDER — ZOLPIDEM TARTRATE 5 MG/1
5 TABLET, FILM COATED ORAL
Refills: 0 | Status: DISCONTINUED | COMMUNITY
End: 2021-02-08

## 2021-02-08 RX ORDER — POLYETHYLENE GLYCOL-3350 AND ELECTROLYTES 236; 6.74; 5.86; 2.97; 22.74 G/274.31G; G/274.31G; G/274.31G; G/274.31G; G/274.31G
236 POWDER, FOR SOLUTION ORAL
Qty: 4000 | Refills: 0 | Status: DISCONTINUED | COMMUNITY
Start: 2020-03-05 | End: 2021-02-08

## 2021-02-08 RX ORDER — IBUPROFEN 200 MG
600 CAPSULE ORAL TWICE DAILY
Refills: 0 | Status: ACTIVE | COMMUNITY
Start: 2021-02-08

## 2021-02-08 RX ORDER — ATORVASTATIN CALCIUM 20 MG/1
20 TABLET, FILM COATED ORAL
Qty: 30 | Refills: 5 | Status: DISCONTINUED | COMMUNITY
Start: 2020-06-15 | End: 2021-02-08

## 2021-02-08 RX ORDER — MECLIZINE HYDROCHLORIDE 25 MG/1
25 TABLET ORAL
Qty: 30 | Refills: 0 | Status: DISCONTINUED | COMMUNITY
Start: 2020-02-25 | End: 2021-02-08

## 2021-03-09 ENCOUNTER — APPOINTMENT (OUTPATIENT)
Dept: PSYCHIATRY | Facility: CLINIC | Age: 71
End: 2021-03-09
Payer: MEDICARE

## 2021-03-09 PROCEDURE — 99215 OFFICE O/P EST HI 40 MIN: CPT | Mod: 95

## 2021-04-06 ENCOUNTER — APPOINTMENT (OUTPATIENT)
Dept: PSYCHIATRY | Facility: CLINIC | Age: 71
End: 2021-04-06
Payer: MEDICARE

## 2021-04-06 PROCEDURE — 99215 OFFICE O/P EST HI 40 MIN: CPT | Mod: 95

## 2021-04-06 RX ORDER — GABAPENTIN 100 MG/1
100 CAPSULE ORAL
Qty: 60 | Refills: 0 | Status: DISCONTINUED | COMMUNITY
Start: 2020-06-18 | End: 2021-04-06

## 2021-04-06 RX ORDER — ESCITALOPRAM OXALATE 5 MG/1
TABLET, FILM COATED ORAL
Refills: 0 | Status: DISCONTINUED | COMMUNITY
End: 2021-04-06

## 2021-04-06 RX ORDER — RAMELTEON 8 MG/1
8 TABLET ORAL AT BEDTIME
Qty: 30 | Refills: 4 | Status: DISCONTINUED | COMMUNITY
Start: 1900-01-01 | End: 2021-04-06

## 2021-04-23 ENCOUNTER — APPOINTMENT (OUTPATIENT)
Dept: PSYCHIATRY | Facility: CLINIC | Age: 71
End: 2021-04-23
Payer: MEDICARE

## 2021-04-23 DIAGNOSIS — F32.1 MAJOR DEPRESSIVE DISORDER, SINGLE EPISODE, MODERATE: ICD-10-CM

## 2021-04-23 PROCEDURE — 99215 OFFICE O/P EST HI 40 MIN: CPT | Mod: 95

## 2021-04-23 RX ORDER — ESCITALOPRAM OXALATE 10 MG/1
10 TABLET ORAL
Qty: 30 | Refills: 0 | Status: DISCONTINUED | COMMUNITY
Start: 2020-03-20 | End: 2021-04-23

## 2021-04-23 RX ORDER — DULOXETINE HYDROCHLORIDE 30 MG/1
30 CAPSULE, DELAYED RELEASE PELLETS ORAL DAILY
Qty: 30 | Refills: 0 | Status: DISCONTINUED | COMMUNITY
Start: 2021-04-06 | End: 2021-04-23

## 2021-04-26 RX ORDER — DULOXETINE HYDROCHLORIDE 40 MG/1
40 CAPSULE, DELAYED RELEASE PELLETS ORAL DAILY
Qty: 30 | Refills: 0 | Status: DISCONTINUED | COMMUNITY
Start: 2021-04-23 | End: 2021-04-26

## 2021-05-28 ENCOUNTER — APPOINTMENT (OUTPATIENT)
Dept: PSYCHIATRY | Facility: CLINIC | Age: 71
End: 2021-05-28

## 2021-06-04 ENCOUNTER — APPOINTMENT (OUTPATIENT)
Dept: PSYCHIATRY | Facility: CLINIC | Age: 71
End: 2021-06-04
Payer: MEDICARE

## 2021-06-04 PROCEDURE — 99215 OFFICE O/P EST HI 40 MIN: CPT | Mod: 95

## 2021-06-16 ENCOUNTER — TRANSCRIPTION ENCOUNTER (OUTPATIENT)
Age: 71
End: 2021-06-16

## 2021-06-17 ENCOUNTER — OUTPATIENT (OUTPATIENT)
Dept: OUTPATIENT SERVICES | Facility: HOSPITAL | Age: 71
LOS: 1 days | Discharge: HOME | End: 2021-06-17

## 2021-06-17 DIAGNOSIS — M79.673 PAIN IN UNSPECIFIED FOOT: Chronic | ICD-10-CM

## 2021-06-17 DIAGNOSIS — H72.92 UNSPECIFIED PERFORATION OF TYMPANIC MEMBRANE, LEFT EAR: Chronic | ICD-10-CM

## 2021-06-17 DIAGNOSIS — H57.9 UNSPECIFIED DISORDER OF EYE AND ADNEXA: Chronic | ICD-10-CM

## 2021-06-17 DIAGNOSIS — K42.9 UMBILICAL HERNIA WITHOUT OBSTRUCTION OR GANGRENE: Chronic | ICD-10-CM

## 2021-06-17 DIAGNOSIS — Z98.890 OTHER SPECIFIED POSTPROCEDURAL STATES: Chronic | ICD-10-CM

## 2021-07-09 ENCOUNTER — APPOINTMENT (OUTPATIENT)
Dept: PSYCHIATRY | Facility: CLINIC | Age: 71
End: 2021-07-09

## 2021-07-21 ENCOUNTER — APPOINTMENT (OUTPATIENT)
Dept: PSYCHIATRY | Facility: CLINIC | Age: 71
End: 2021-07-21
Payer: MEDICARE

## 2021-07-21 PROCEDURE — 99215 OFFICE O/P EST HI 40 MIN: CPT

## 2021-09-15 ENCOUNTER — APPOINTMENT (OUTPATIENT)
Dept: PSYCHIATRY | Facility: CLINIC | Age: 71
End: 2021-09-15
Payer: MEDICARE

## 2021-09-15 PROCEDURE — 99215 OFFICE O/P EST HI 40 MIN: CPT | Mod: 95

## 2021-09-15 RX ORDER — BUPROPION HCL 200 MG
TABLET,SUSTAINED-RELEASE 12 HR ORAL
Refills: 0 | Status: DISCONTINUED | COMMUNITY
End: 2021-09-15

## 2021-10-05 ENCOUNTER — NON-APPOINTMENT (OUTPATIENT)
Age: 71
End: 2021-10-05

## 2021-10-15 ENCOUNTER — APPOINTMENT (OUTPATIENT)
Dept: PSYCHIATRY | Facility: CLINIC | Age: 71
End: 2021-10-15

## 2021-10-20 ENCOUNTER — RESULT REVIEW (OUTPATIENT)
Age: 71
End: 2021-10-20

## 2021-12-15 ENCOUNTER — APPOINTMENT (OUTPATIENT)
Dept: PSYCHIATRY | Facility: CLINIC | Age: 71
End: 2021-12-15
Payer: MEDICARE

## 2021-12-15 PROCEDURE — G2212 PROLONG OUTPT/OFFICE VIS: CPT | Mod: 95

## 2021-12-15 PROCEDURE — 99215 OFFICE O/P EST HI 40 MIN: CPT | Mod: 95

## 2022-01-19 ENCOUNTER — APPOINTMENT (OUTPATIENT)
Dept: PSYCHIATRY | Facility: CLINIC | Age: 72
End: 2022-01-19

## 2022-01-28 ENCOUNTER — APPOINTMENT (OUTPATIENT)
Dept: PSYCHIATRY | Facility: CLINIC | Age: 72
End: 2022-01-28
Payer: MEDICARE

## 2022-01-28 PROCEDURE — 99215 OFFICE O/P EST HI 40 MIN: CPT | Mod: 95

## 2022-02-16 ENCOUNTER — APPOINTMENT (OUTPATIENT)
Dept: PSYCHIATRY | Facility: CLINIC | Age: 72
End: 2022-02-16
Payer: MEDICARE

## 2022-02-16 PROCEDURE — 99214 OFFICE O/P EST MOD 30 MIN: CPT | Mod: 95

## 2022-03-14 LAB
ACE BLD-CCNC: 40 U/L
ALBUMIN SERPL ELPH-MCNC: 4.7 G/DL
ALP BLD-CCNC: 95 U/L
ALT SERPL-CCNC: 51 U/L
ANION GAP SERPL CALC-SCNC: 18 MMOL/L
AST SERPL-CCNC: 43 U/L
B2 GLYCOPROT1 AB SER QL: NEGATIVE
B2 GLYCOPROT1 IGA SERPL IA-ACNC: <5 SAU
B2 GLYCOPROT1 IGG SER-ACNC: <5 SGU
B2 GLYCOPROT1 IGM SER-ACNC: <5 SMU
BASOPHILS # BLD AUTO: 0.06 K/UL
BASOPHILS NFR BLD AUTO: 0.6 %
BILIRUB SERPL-MCNC: 0.6 MG/DL
BUN SERPL-MCNC: 15 MG/DL
CALCIUM SERPL-MCNC: 10.6 MG/DL
CARDIOLIPIN AB SER IA-ACNC: POSITIVE
CARDIOLIPIN IGM SER-MCNC: 10.3 MPL
CARDIOLIPIN IGM SER-MCNC: 12.8 GPL
CHLORIDE SERPL-SCNC: 101 MMOL/L
CHOLEST SERPL-MCNC: 130 MG/DL
CHOLEST/HDLC SERPL: 2.6 RATIO
CO2 SERPL-SCNC: 20 MMOL/L
CREAT SERPL-MCNC: 1.12 MG/DL
CRP SERPL HS-MCNC: 0.74 MG/L
DEPRECATED CARDIOLIPIN IGA SER: <5 APL
EOSINOPHIL # BLD AUTO: 0.1 K/UL
EOSINOPHIL NFR BLD AUTO: 1.1 %
ERYTHROCYTE [SEDIMENTATION RATE] IN BLOOD BY WESTERGREN METHOD: 20 MM/HR
ESTIMATED AVERAGE GLUCOSE: 108 MG/DL
FOLATE SERPL-MCNC: 8.5 NG/ML
GLUCOSE SERPL-MCNC: 99 MG/DL
HBA1C MFR BLD HPLC: 5.4 %
HCT VFR BLD CALC: 51 %
HDLC SERPL-MCNC: 49 MG/DL
HGB BLD-MCNC: 16.9 G/DL
HOMOCYSTEINE LEVEL: 12.4 UMOL/L
IMM GRANULOCYTES NFR BLD AUTO: 0.3 %
INR PPP: ABNORMAL RATIO
LDLC SERPL CALC-MCNC: 59 MG/DL
LYMPHOCYTES # BLD AUTO: 4.76 K/UL
LYMPHOCYTES NFR BLD AUTO: 50.5 %
MAN DIFF?: NORMAL
MCHC RBC-ENTMCNC: 31 PG
MCHC RBC-ENTMCNC: 33.1 GM/DL
MCV RBC AUTO: 93.6 FL
METHYLMALONIC ACID LEVEL: 139 NMOL/L
MONOCYTES # BLD AUTO: 0.45 K/UL
MONOCYTES NFR BLD AUTO: 4.8 %
MPO AB + PR3 PNL SER: NORMAL
NEUTROPHILS # BLD AUTO: 4.03 K/UL
NEUTROPHILS NFR BLD AUTO: 42.7 %
PLATELET # BLD AUTO: 236 K/UL
POTASSIUM SERPL-SCNC: 4.6 MMOL/L
PROT SERPL-MCNC: 7.4 G/DL
PT BLD: NORMAL
RBC # BLD: 5.45 M/UL
RBC # FLD: 13.7 %
SODIUM SERPL-SCNC: 140 MMOL/L
TRIGL SERPL-MCNC: 107 MG/DL
TSH SERPL-ACNC: 0.78 UIU/ML
VIT B12 SERPL-MCNC: 612 PG/ML
WBC # FLD AUTO: 9.43 K/UL

## 2022-03-15 ENCOUNTER — NON-APPOINTMENT (OUTPATIENT)
Age: 72
End: 2022-03-15

## 2022-03-16 ENCOUNTER — APPOINTMENT (OUTPATIENT)
Dept: PSYCHIATRY | Facility: CLINIC | Age: 72
End: 2022-03-16

## 2022-03-17 NOTE — ASU PREOP CHECKLIST - PATIENT'S PERSONAL PROPERTY REMOVED
Triage Note: Mother reports last night pt took off running to the other room and began crying. Mother picked pt up and was able to console her, but was unsure of what made her cry. Pt woke up this morning with a black eye. Mother concerned that she may have fallen and hit eye. glasses

## 2022-04-08 ENCOUNTER — APPOINTMENT (OUTPATIENT)
Dept: PSYCHIATRY | Facility: CLINIC | Age: 72
End: 2022-04-08
Payer: MEDICARE

## 2022-04-08 PROCEDURE — 99215 OFFICE O/P EST HI 40 MIN: CPT | Mod: 95

## 2022-05-05 ENCOUNTER — APPOINTMENT (OUTPATIENT)
Dept: PSYCHIATRY | Facility: CLINIC | Age: 72
End: 2022-05-05
Payer: MEDICARE

## 2022-05-05 PROCEDURE — 99215 OFFICE O/P EST HI 40 MIN: CPT | Mod: 95

## 2022-05-11 ENCOUNTER — NON-APPOINTMENT (OUTPATIENT)
Age: 72
End: 2022-05-11

## 2022-05-11 ENCOUNTER — APPOINTMENT (OUTPATIENT)
Dept: INTERNAL MEDICINE | Facility: CLINIC | Age: 72
End: 2022-05-11
Payer: MEDICARE

## 2022-05-11 VITALS
OXYGEN SATURATION: 96 % | SYSTOLIC BLOOD PRESSURE: 137 MMHG | HEART RATE: 92 BPM | BODY MASS INDEX: 25.33 KG/M2 | HEIGHT: 69 IN | WEIGHT: 171 LBS | TEMPERATURE: 97.8 F | DIASTOLIC BLOOD PRESSURE: 83 MMHG

## 2022-05-11 DIAGNOSIS — D22.9 MELANOCYTIC NEVI, UNSPECIFIED: ICD-10-CM

## 2022-05-11 DIAGNOSIS — R61 GENERALIZED HYPERHIDROSIS: ICD-10-CM

## 2022-05-11 DIAGNOSIS — I63.119 CEREBRAL INFARCTION DUE TO EMBOLISM OF UNSPECIFIED VERTEBRAL ARTERY: ICD-10-CM

## 2022-05-11 DIAGNOSIS — H53.8 OTHER VISUAL DISTURBANCES: ICD-10-CM

## 2022-05-11 DIAGNOSIS — R46.89 OTHER SYMPTOMS AND SIGNS INVOLVING APPEARANCE AND BEHAVIOR: ICD-10-CM

## 2022-05-11 PROCEDURE — 99205 OFFICE O/P NEW HI 60 MIN: CPT

## 2022-05-27 ENCOUNTER — APPOINTMENT (OUTPATIENT)
Dept: PSYCHIATRY | Facility: CLINIC | Age: 72
End: 2022-05-27
Payer: MEDICARE

## 2022-05-27 PROCEDURE — 99215 OFFICE O/P EST HI 40 MIN: CPT

## 2022-06-24 ENCOUNTER — APPOINTMENT (OUTPATIENT)
Dept: PSYCHIATRY | Facility: CLINIC | Age: 72
End: 2022-06-24
Payer: MEDICARE

## 2022-06-24 PROCEDURE — 99215 OFFICE O/P EST HI 40 MIN: CPT

## 2022-08-05 ENCOUNTER — APPOINTMENT (OUTPATIENT)
Dept: PSYCHIATRY | Facility: CLINIC | Age: 72
End: 2022-08-05

## 2022-08-12 ENCOUNTER — APPOINTMENT (OUTPATIENT)
Dept: PSYCHIATRY | Facility: CLINIC | Age: 72
End: 2022-08-12

## 2022-08-12 ENCOUNTER — NON-APPOINTMENT (OUTPATIENT)
Age: 72
End: 2022-08-12

## 2022-08-12 PROCEDURE — 99215 OFFICE O/P EST HI 40 MIN: CPT

## 2022-08-12 RX ORDER — DULOXETINE HYDROCHLORIDE 60 MG/1
60 CAPSULE, DELAYED RELEASE PELLETS ORAL
Qty: 30 | Refills: 4 | Status: DISCONTINUED | COMMUNITY
Start: 2021-04-26 | End: 2022-08-12

## 2022-08-12 RX ORDER — DULOXETINE HYDROCHLORIDE 20 MG/1
20 CAPSULE, DELAYED RELEASE PELLETS ORAL DAILY
Qty: 30 | Refills: 4 | Status: DISCONTINUED | COMMUNITY
Start: 2022-02-16 | End: 2022-08-12

## 2022-09-02 ENCOUNTER — APPOINTMENT (OUTPATIENT)
Dept: PSYCHIATRY | Facility: CLINIC | Age: 72
End: 2022-09-02

## 2022-09-16 ENCOUNTER — APPOINTMENT (OUTPATIENT)
Dept: PSYCHIATRY | Facility: CLINIC | Age: 72
End: 2022-09-16

## 2022-09-22 ENCOUNTER — NON-APPOINTMENT (OUTPATIENT)
Age: 72
End: 2022-09-22

## 2022-09-26 ENCOUNTER — APPOINTMENT (OUTPATIENT)
Dept: DERMATOLOGY | Facility: CLINIC | Age: 72
End: 2022-09-26

## 2022-10-21 ENCOUNTER — APPOINTMENT (OUTPATIENT)
Dept: PSYCHIATRY | Facility: CLINIC | Age: 72
End: 2022-10-21

## 2022-11-11 ENCOUNTER — APPOINTMENT (OUTPATIENT)
Dept: PSYCHIATRY | Facility: CLINIC | Age: 72
End: 2022-11-11

## 2022-11-18 ENCOUNTER — APPOINTMENT (OUTPATIENT)
Dept: PSYCHIATRY | Facility: CLINIC | Age: 72
End: 2022-11-18

## 2022-11-18 DIAGNOSIS — M25.552 PAIN IN LEFT HIP: ICD-10-CM

## 2022-11-18 DIAGNOSIS — H00.019 HORDEOLUM EXTERNUM UNSPECIFIED EYE, UNSPECIFIED EYELID: ICD-10-CM

## 2022-11-18 DIAGNOSIS — R21 RASH AND OTHER NONSPECIFIC SKIN ERUPTION: ICD-10-CM

## 2022-11-18 PROCEDURE — 99215 OFFICE O/P EST HI 40 MIN: CPT

## 2022-11-19 NOTE — REASON FOR VISIT
[Patient] : Patient [FreeTextEntry1] : treatment of anxiety (REGINE?) , dysthymia and/or MDD mild and possible PD

## 2022-11-19 NOTE — PHYSICAL EXAM
[Average] : average [Cooperative] : cooperative [Clear] : clear [Linear/Goal Directed] : linear/goal directed [WNL] : within normal limits [Constricted] : constricted [FreeTextEntry8] : mildly depressed [de-identified] : adequate [de-identified] : fair

## 2022-11-19 NOTE — PLAN
[No] : No [Medication education provided] : Medication education provided. [FreeTextEntry5] : Offered support/encouragement, psychoeducation and counseling regarding dx, meds, symptoms, etc.\par \par Reviewed/discussed plan below:\par \par Continue Gabapentin 300 mg qhs and Trazodone 200 mg qhs for anxiety and insomnia respectively\par Continue Ramelteon for insomnia and may take  Ambien at 2.5-5 mg prn up to 2-3 times weekly prn\par May continue Wellbutrin  mg for smoking cessation, to discuss further next session; (may increase prn landy as pt off Cymbalta)\par Pt to solidify connection with new PCP and discuss chronic cough landy as pt is a long time smoker\par Pt to f/u with oncologist as due for labs\par Contact writer prn\par \par 5/5/22: SOS-10: 52\par \par 40 minutes spent reviewing prior records/notes, seeing patient and recording session note\par  [FreeTextEntry4] : Patient gets out of the house at least a few days a week though still at times have periods of staying inside for many consecutive days with curtains closed; patient smoking 8-9 cigarettes a day and reviewed plan to further reduce

## 2022-11-19 NOTE — HISTORY OF PRESENT ILLNESS
[Not Applicable] : Not applicable [FreeTextEntry1] : Patient with chronic dysphoria and with some anxiety related to medical fears and going outside.   [FreeTextEntry2] : Patient reports first coming into psychiatric treatment to [facility] in the late 1980s, probably 1988 per pt.  (Records from [facility] show first appointment in 1996.) \par \par At the time, she stated she was crying a lot, unable to get up, laying in the dark and feeling very unsure of herself.  SHe additionally was afraid of being around crowds.  Patient stated that she was diagnosed with depression and started taking medications for this in the late 80s or early 90s and has been in treatment ever since, mostly psychopharm tx wit some attempts at therapy.   (Appears she started tx in 1996 however)\par \par Patient denied history of psych hospitalizations, suicidality, psychosis or javi though records indicate in 1996 she admitted to thinking about jumping out the window of her 13th story apartment that she shared with her mother.

## 2022-11-19 NOTE — SOCIAL HISTORY
[Alone] : lives alone [Disabled] : disabled [Never ] : never  [FreeTextEntry1] : but with grandson most recently staying with her as his mother and sister with COVID while he is well

## 2023-01-06 ENCOUNTER — APPOINTMENT (OUTPATIENT)
Dept: PSYCHIATRY | Facility: CLINIC | Age: 73
End: 2023-01-06
Payer: MEDICARE

## 2023-01-06 PROCEDURE — 99215 OFFICE O/P EST HI 40 MIN: CPT

## 2023-01-06 RX ORDER — BUPROPION HYDROCHLORIDE 150 MG/1
150 TABLET, FILM COATED, EXTENDED RELEASE ORAL
Qty: 30 | Refills: 4 | Status: DISCONTINUED | COMMUNITY
Start: 2022-06-24 | End: 2023-01-06

## 2023-01-06 NOTE — PLAN
[No] : No [Medication education provided] : Medication education provided. [FreeTextEntry4] : Patient gets out of the house at least a few days a week though still at times have periods of staying inside for many consecutive days with curtains closed; patient smoking 8-9 cigarettes a day and reviewed plan to further reduce [FreeTextEntry5] : Offered support/encouragement, psychoeducation and counseling regarding dx, meds, symptoms, etc.\par \par Reviewed/discussed plan below:\par \par Continue Gabapentin 300 mg qhs and Trazodone 200 mg qhs for anxiety and insomnia respectively\par Continue Ramelteon for insomnia and may take Ambien at 2.5-5 mg prn though hasn't been able to have filled since Sept 2022\par Increase Wellbutrinto  mg for both smoking cessation and to augment for depression and energy\par To discuss further relationship with PCP and chronic cough\par Pt to send labs to writer when she gets them\par Completed SOS-10 today\par Discussed potential for therapy though pt uninterested\par Contact writer prn\par \par 5/5/22: SOS-10: 54 minimally impaired\par 1/6/23: SOS-10: 44, minimally impaired\par \par 40 minutes spent reviewing prior records/notes, seeing patient and recording session note\par

## 2023-01-06 NOTE — HISTORY OF PRESENT ILLNESS
[Not Applicable] : Not applicable [FreeTextEntry1] : Patient with chronic dysphoria and with some anxiety related to medical fears and going outside.   [FreeTextEntry2] : Patient reports first coming into psychiatric treatment to [facility] in the late 1980s, probably 1988 per pt.  (Records from [facility] show first appointment in 1996.) \par \par At the time, she stated she was crying a lot, unable to get up, laying in the dark and feeling very unsure of herself.  SHe additionally was afraid of being around crowds.  Patient stated that she was diagnosed with depression and started taking medications for this in the late 80s or early 90s and has been in treatment ever since, mostly psychopharm tx wit some attempts at therapy.   (Appears she started tx in 1996 however)\par \par Patient denied history of psych hospitalizations, suicidality, psychosis or javi though records indicate in 1996 she admitted to thinking about jumping out the window of her 13th story apartment that she shared with her mother.

## 2023-01-06 NOTE — PHYSICAL EXAM
[3] : 3 [4] : 4 [5] : 5 [6] : 6 [Average] : average [Cooperative] : cooperative [Constricted] : constricted [Clear] : clear [Linear/Goal Directed] : linear/goal directed [WNL] : within normal limits [FreeTextEntry8] : mildly depressed [de-identified] : adequate [de-identified] : fair [FreeTextEntry1] : 44

## 2023-01-06 NOTE — PHYSICAL EXAM
[3] : 3 [4] : 4 [5] : 5 [6] : 6 [Average] : average [Cooperative] : cooperative [Constricted] : constricted [Clear] : clear [Linear/Goal Directed] : linear/goal directed [WNL] : within normal limits [FreeTextEntry8] : mildly depressed [de-identified] : adequate [de-identified] : fair [FreeTextEntry1] : 44

## 2023-01-06 NOTE — HISTORY OF PRESENT ILLNESS
[Not Applicable] : Not applicable [FreeTextEntry1] : Patient with chronic dysphoria and with some anxiety related to medical fears and going outside.   [FreeTextEntry2] : Patient reports first coming into psychiatric treatment to [facility] in the late 1980s, probably 1988 per pt.  (Records from [facility] show first appointment in 1996.) \par \par At the time, she stated she was crying a lot, unable to get up, laying in the dark and feeling very unsure of herself.  SHe additionally was afraid of being around crowds.  Patient stated that she was diagnosed with depression and started taking medications for this in the late 80s or early 90s and has been in treatment ever since, mostly psychopharm tx wit some attempts at therapy.   (Appears she started tx in 1996 however)\par \par Patient denied history of psych hospitalizations, suicidality, psychosis or javi though records indicate in 1996 she admitted to thinking about jumping out the window of her 13th story apartment that she shared with her mother.

## 2023-01-19 ENCOUNTER — INPATIENT (INPATIENT)
Facility: HOSPITAL | Age: 73
LOS: 1 days | Discharge: HOME | DRG: 176 | End: 2023-01-21
Attending: INTERNAL MEDICINE | Admitting: INTERNAL MEDICINE
Payer: MEDICARE

## 2023-01-19 VITALS
RESPIRATION RATE: 22 BRPM | TEMPERATURE: 98 F | WEIGHT: 162.7 LBS | OXYGEN SATURATION: 100 % | SYSTOLIC BLOOD PRESSURE: 126 MMHG | HEART RATE: 120 BPM | DIASTOLIC BLOOD PRESSURE: 84 MMHG | HEIGHT: 69 IN

## 2023-01-19 DIAGNOSIS — M79.673 PAIN IN UNSPECIFIED FOOT: Chronic | ICD-10-CM

## 2023-01-19 DIAGNOSIS — Z86.73 PERSONAL HISTORY OF TRANSIENT ISCHEMIC ATTACK (TIA), AND CEREBRAL INFARCTION WITHOUT RESIDUAL DEFICITS: ICD-10-CM

## 2023-01-19 DIAGNOSIS — I10 ESSENTIAL (PRIMARY) HYPERTENSION: ICD-10-CM

## 2023-01-19 DIAGNOSIS — K42.9 UMBILICAL HERNIA WITHOUT OBSTRUCTION OR GANGRENE: Chronic | ICD-10-CM

## 2023-01-19 DIAGNOSIS — C50.919 MALIGNANT NEOPLASM OF UNSPECIFIED SITE OF UNSPECIFIED FEMALE BREAST: ICD-10-CM

## 2023-01-19 DIAGNOSIS — H72.92 UNSPECIFIED PERFORATION OF TYMPANIC MEMBRANE, LEFT EAR: Chronic | ICD-10-CM

## 2023-01-19 DIAGNOSIS — I26.99 OTHER PULMONARY EMBOLISM WITHOUT ACUTE COR PULMONALE: ICD-10-CM

## 2023-01-19 DIAGNOSIS — H57.9 UNSPECIFIED DISORDER OF EYE AND ADNEXA: Chronic | ICD-10-CM

## 2023-01-19 DIAGNOSIS — F32.9 MAJOR DEPRESSIVE DISORDER, SINGLE EPISODE, UNSPECIFIED: ICD-10-CM

## 2023-01-19 DIAGNOSIS — Z29.9 ENCOUNTER FOR PROPHYLACTIC MEASURES, UNSPECIFIED: ICD-10-CM

## 2023-01-19 DIAGNOSIS — Z98.890 OTHER SPECIFIED POSTPROCEDURAL STATES: Chronic | ICD-10-CM

## 2023-01-19 LAB
ALBUMIN SERPL ELPH-MCNC: 4.4 G/DL — SIGNIFICANT CHANGE UP (ref 3.3–5)
ALP SERPL-CCNC: 105 U/L — SIGNIFICANT CHANGE UP (ref 40–120)
ALT FLD-CCNC: 15 U/L — SIGNIFICANT CHANGE UP (ref 10–45)
ANION GAP SERPL CALC-SCNC: 10 MMOL/L — SIGNIFICANT CHANGE UP (ref 5–17)
APTT BLD: 30.9 SEC — SIGNIFICANT CHANGE UP (ref 27.5–35.5)
AST SERPL-CCNC: 19 U/L — SIGNIFICANT CHANGE UP (ref 10–40)
BASOPHILS # BLD AUTO: 0.07 K/UL — SIGNIFICANT CHANGE UP (ref 0–0.2)
BASOPHILS NFR BLD AUTO: 0.5 % — SIGNIFICANT CHANGE UP (ref 0–2)
BILIRUB SERPL-MCNC: 1.1 MG/DL — SIGNIFICANT CHANGE UP (ref 0.2–1.2)
BUN SERPL-MCNC: 10 MG/DL — SIGNIFICANT CHANGE UP (ref 7–23)
CALCIUM SERPL-MCNC: 10 MG/DL — SIGNIFICANT CHANGE UP (ref 8.4–10.5)
CHLORIDE SERPL-SCNC: 101 MMOL/L — SIGNIFICANT CHANGE UP (ref 96–108)
CK MB CFR SERPL CALC: 2.5 NG/ML — SIGNIFICANT CHANGE UP (ref 0–6.7)
CK SERPL-CCNC: 75 U/L — SIGNIFICANT CHANGE UP (ref 25–170)
CO2 SERPL-SCNC: 28 MMOL/L — SIGNIFICANT CHANGE UP (ref 22–31)
CREAT SERPL-MCNC: 0.93 MG/DL — SIGNIFICANT CHANGE UP (ref 0.5–1.3)
EGFR: 66 ML/MIN/1.73M2 — SIGNIFICANT CHANGE UP
EOSINOPHIL # BLD AUTO: 0.03 K/UL — SIGNIFICANT CHANGE UP (ref 0–0.5)
EOSINOPHIL NFR BLD AUTO: 0.2 % — SIGNIFICANT CHANGE UP (ref 0–6)
FLUAV AG NPH QL: SIGNIFICANT CHANGE UP
FLUBV AG NPH QL: SIGNIFICANT CHANGE UP
GLUCOSE SERPL-MCNC: 138 MG/DL — HIGH (ref 70–99)
HCT VFR BLD CALC: 46 % — HIGH (ref 34.5–45)
HGB BLD-MCNC: 15.3 G/DL — SIGNIFICANT CHANGE UP (ref 11.5–15.5)
IMM GRANULOCYTES NFR BLD AUTO: 0.4 % — SIGNIFICANT CHANGE UP (ref 0–0.9)
INR BLD: 1.2 — HIGH (ref 0.88–1.16)
LYMPHOCYTES # BLD AUTO: 2.94 K/UL — SIGNIFICANT CHANGE UP (ref 1–3.3)
LYMPHOCYTES # BLD AUTO: 21.7 % — SIGNIFICANT CHANGE UP (ref 13–44)
MCHC RBC-ENTMCNC: 29.9 PG — SIGNIFICANT CHANGE UP (ref 27–34)
MCHC RBC-ENTMCNC: 33.3 GM/DL — SIGNIFICANT CHANGE UP (ref 32–36)
MCV RBC AUTO: 89.8 FL — SIGNIFICANT CHANGE UP (ref 80–100)
MONOCYTES # BLD AUTO: 1.07 K/UL — HIGH (ref 0–0.9)
MONOCYTES NFR BLD AUTO: 7.9 % — SIGNIFICANT CHANGE UP (ref 2–14)
NEUTROPHILS # BLD AUTO: 9.4 K/UL — HIGH (ref 1.8–7.4)
NEUTROPHILS NFR BLD AUTO: 69.3 % — SIGNIFICANT CHANGE UP (ref 43–77)
NRBC # BLD: 0 /100 WBCS — SIGNIFICANT CHANGE UP (ref 0–0)
NT-PROBNP SERPL-SCNC: 912 PG/ML — HIGH (ref 0–300)
PLATELET # BLD AUTO: 296 K/UL — SIGNIFICANT CHANGE UP (ref 150–400)
POTASSIUM SERPL-MCNC: 4 MMOL/L — SIGNIFICANT CHANGE UP (ref 3.5–5.3)
POTASSIUM SERPL-SCNC: 4 MMOL/L — SIGNIFICANT CHANGE UP (ref 3.5–5.3)
PROT SERPL-MCNC: 8.1 G/DL — SIGNIFICANT CHANGE UP (ref 6–8.3)
PROTHROM AB SERPL-ACNC: 14.3 SEC — HIGH (ref 10.5–13.4)
RBC # BLD: 5.12 M/UL — SIGNIFICANT CHANGE UP (ref 3.8–5.2)
RBC # FLD: 14 % — SIGNIFICANT CHANGE UP (ref 10.3–14.5)
RSV RNA NPH QL NAA+NON-PROBE: SIGNIFICANT CHANGE UP
SARS-COV-2 RNA SPEC QL NAA+PROBE: SIGNIFICANT CHANGE UP
SODIUM SERPL-SCNC: 139 MMOL/L — SIGNIFICANT CHANGE UP (ref 135–145)
TROPONIN T SERPL-MCNC: 0.01 NG/ML — SIGNIFICANT CHANGE UP (ref 0–0.01)
TROPONIN T SERPL-MCNC: 0.01 NG/ML — SIGNIFICANT CHANGE UP (ref 0–0.01)
WBC # BLD: 13.57 K/UL — HIGH (ref 3.8–10.5)
WBC # FLD AUTO: 13.57 K/UL — HIGH (ref 3.8–10.5)

## 2023-01-19 PROCEDURE — 78582 LUNG VENTILAT&PERFUS IMAGING: CPT | Mod: 26,MA

## 2023-01-19 PROCEDURE — 71045 X-RAY EXAM CHEST 1 VIEW: CPT | Mod: 26

## 2023-01-19 PROCEDURE — 99285 EMERGENCY DEPT VISIT HI MDM: CPT

## 2023-01-19 RX ORDER — TRAZODONE HCL 50 MG
100 TABLET ORAL AT BEDTIME
Refills: 0 | Status: DISCONTINUED | OUTPATIENT
Start: 2023-01-20 | End: 2023-01-20

## 2023-01-19 RX ORDER — LANOLIN ALCOHOL/MO/W.PET/CERES
5 CREAM (GRAM) TOPICAL AT BEDTIME
Refills: 0 | Status: DISCONTINUED | OUTPATIENT
Start: 2023-01-19 | End: 2023-01-21

## 2023-01-19 RX ORDER — BUPROPION HYDROCHLORIDE 150 MG/1
300 TABLET, EXTENDED RELEASE ORAL DAILY
Refills: 0 | Status: DISCONTINUED | OUTPATIENT
Start: 2023-01-20 | End: 2023-01-21

## 2023-01-19 RX ORDER — SODIUM CHLORIDE 9 MG/ML
1000 INJECTION INTRAMUSCULAR; INTRAVENOUS; SUBCUTANEOUS ONCE
Refills: 0 | Status: COMPLETED | OUTPATIENT
Start: 2023-01-19 | End: 2023-01-19

## 2023-01-19 RX ORDER — ENOXAPARIN SODIUM 100 MG/ML
70 INJECTION SUBCUTANEOUS EVERY 12 HOURS
Refills: 0 | Status: DISCONTINUED | OUTPATIENT
Start: 2023-01-20 | End: 2023-01-20

## 2023-01-19 RX ORDER — GABAPENTIN 400 MG/1
200 CAPSULE ORAL AT BEDTIME
Refills: 0 | Status: DISCONTINUED | OUTPATIENT
Start: 2023-01-20 | End: 2023-01-21

## 2023-01-19 RX ORDER — GABAPENTIN 400 MG/1
200 CAPSULE ORAL ONCE
Refills: 0 | Status: COMPLETED | OUTPATIENT
Start: 2023-01-19 | End: 2023-01-19

## 2023-01-19 RX ORDER — ENOXAPARIN SODIUM 100 MG/ML
70 INJECTION SUBCUTANEOUS ONCE
Refills: 0 | Status: COMPLETED | OUTPATIENT
Start: 2023-01-19 | End: 2023-01-19

## 2023-01-19 RX ORDER — TRAZODONE HCL 50 MG
200 TABLET ORAL ONCE
Refills: 0 | Status: COMPLETED | OUTPATIENT
Start: 2023-01-19 | End: 2023-01-19

## 2023-01-19 RX ADMIN — ENOXAPARIN SODIUM 70 MILLIGRAM(S): 100 INJECTION SUBCUTANEOUS at 19:37

## 2023-01-19 RX ADMIN — SODIUM CHLORIDE 1000 MILLILITER(S): 9 INJECTION INTRAMUSCULAR; INTRAVENOUS; SUBCUTANEOUS at 13:36

## 2023-01-19 NOTE — H&P ADULT - PROBLEM SELECTOR PLAN 1
- VQ scan showing bl PE, BP stable. no chest pain. trop neg x2. EKG with aVR elevation and diffuse ST depressions likely global ischemic from PE. High PESI score  - given lovenox  - discussed with dr. Lopez  - recommended admission to [facility] with pulm to see patient - VQ scan showing bl PE, BP stable. no chest pain. trop neg x2. EKG with aVR elevation and diffuse ST depressions likely global ischemic from PE. High PESI score  - lovenox 70mg q12h  - discussed with dr. Lopez  - recommended admission to [facility] with pulm to see patient Hx breast CA (s/p lumpectomy/XRT, on daily exemestane.) colon CA screening UTD (2016 wnl). denies FHx blood disorders.  following with dr. montanez for erythrocytosis, s/p BMBx on 1/12, pathology pending.  VQ scan showing bilateral PE, HD stable.  no chest pain. trop neg x2. EKG with aVR elevation and diffuse ST depressions likely global ischemic from PE.   High PESI score (class IV, indicating 4-11.4% 30 day mortality).  - lovenox 70mg q12h, transition to Eliquis when appropriate  - discussed with dr. Lopez  - pulmonology consult in AM Sedentary lifestyle with hx breast CA (s/p lumpectomy/XRT, on daily exemestane.) colon CA screening UTD (2016 wnl). denies FHx blood disorders. s/p BMBx on 1/12 with Dr. Kilgore for erythrocytosis, path pending.  VQ scan showing bilateral PE, HD stable.  no chest pain. trop neg x2. EKG with aVR elevation and diffuse ST depressions likely global ischemic from PE.   High PESI score (class IV, indicating 4-11.4% 30 day mortality).  - lovenox 70mg q12h, transition to Eliquis when appropriate  - pulmonology consult in AM

## 2023-01-19 NOTE — H&P ADULT - ASSESSMENT
72F with PMH R breast cancer (2019, s/p lumpectomy XRT. on exemestane), depression, erythrocytosis (weekly phlebotomy 500cc), ?CVA who presents with 1 day of CARRILLO, found to have evidence of multiple bilateral pulmonary emboli, admitted for further management.

## 2023-01-19 NOTE — H&P ADULT - ATTENDING COMMENTS
#PE: p/w SOB/CARRILLO and pleuritis CP. VQ scan + multiple bilateral PEs. +sinus tachycardia, on RA sating well/comfortable. Likely in setting of sedentary lifestyle, hx of breast cancer, no FH of DVT/PE.. No evidence of RHS. PERT team consulted, recommend lovenox, f/up TTE, LE dopplers. Monitor resp status.

## 2023-01-19 NOTE — H&P ADULT - NSICDXPASTSURGICALHX_GEN_ALL_CORE_FT
PAST SURGICAL HISTORY:  Eardrum rupture, left     Foot pain bilateral bone between pinky toe on the left and the right    H/O lumpectomy right- cancer    Hernia, umbilical as a child    Lesion of eye left eye stye

## 2023-01-19 NOTE — H&P ADULT - PROBLEM SELECTOR PLAN 3
R breast CA s/p lumpectomy/XRT. On daily PO drug, does not recall name. R breast CA s/p lumpectomy/XRT  On daily PO exemestane (aromatase inhibitor)  follows with Dr. Kilgore.    #Erythrocytosis  follows with Dr. Kilgore, gets weekly phlebotomy. Hgb 15.3 on admission. R breast CA s/p lumpectomy/XRT. On daily PO exemestane (aromatase inhibitor), follows with Dr. Kilgore.  - contact Dr. Kilgore in AM about resuming exemestane    #Erythrocytosis  follows with Dr. Kilgore, gets weekly phlebotomy. Hgb 15.3 on admission.  - outpatient follow up

## 2023-01-19 NOTE — H&P ADULT - HISTORY OF PRESENT ILLNESS
CC: "  HPI  Denies fevers, chills, cough, chest pain, dyspnea, nausea, headache, diarrhea, sick contactschange in urinary or bowel habits      In the ED:    - VS: Tmax: , HR:  , BP:  , RR:  , O2:    %     - Pertinent Labs:     - Imaging: CXR: CT: US: Cath: EKG:     - Treatment/interventions:     PMHx:   PSHx:  Meds: See med rec  Allergies:  Social: see below      Pt is a 73yo f, h/o htn, r breast ca s/p lumpectomy and rt, cva, depression, who p/w sob starting yesterday while going grocery shopping. Sob is worse w/ exertion and better at rest. + dizziness/ lightheadedness. No associated chest pain, palpitations, syncope, n/t/w... No leg pain, swelling, prolonged immobility. No h/o thromboembolism/ cad. Had stress test few years ago and neg.    In the ED:    - VS: Tmax: 98.9, HR: 120, BP: 126/84, RR: 22, O2: 100% room air    - Pertinent Labs: wbc 13.57, CK/CKMB wnl, trop neg x2, pro-, INR 1.2    - Imaging: VQ scan: Multiple bilateral pulmonary emboli, may be several days old    - Treatment/interventions: 1L NS, Lovenox 70mg x1    PMHx:   PSHx:  Meds: See med rec  Allergies:  Social: see below      72F with PMH R breast cancer (2019, s/p lumpectomy XRT. on exemestane), depression, erythrocytosis (weekly phlebotomy 500cc), ?CVA who presents with 1 day of CARRILLO. Pt states that she was shopping, got to the parking lot, and felt sudden onset dyspnea with walking even several steps. Also notes mild left side pleuritic discomfort with deep breathing. Current smoker, 1/4 pack per day x45 years. Reports had NST a few years ago, which was not concerning. Denies headache, vision change, chest pressure, palpitations, syncope, nausea/vomiting, abdominal pain, hematuria, or LE swelling/pain. She follows with Dr. Kilgore for her hx breast CA, states that she underwent BMBx on 1/12 for "thick blood." Does not know results of pathology. Denies any known active cancer, FHx blood disorders, prolonged immobility, fracture, or recent surgery. Denies hx PE or DVT.    In the ED:    - VS: Tmax: 98.9, HR: 120, BP: 126/84, RR: 22, O2: 100% room air    - Pertinent Labs: wbc 13.57, CK/CKMB wnl, trop neg x2, pro-, INR 1.2    - Imaging: CXR (author read): unremarkable VQ scan: Multiple bilateral pulmonary emboli, may be several days old.    - Treatment/interventions: 1L NS, Lovenox 70mg x1 72F with PMH R breast cancer (2019, s/p lumpectomy XRT. on exemestane), depression, erythrocytosis (weekly phlebotomy 500cc), ?CVA who presents with 1 day of CARRILLO. Pt states that she was shopping, got to the parking lot, and felt sudden onset dyspnea with walking even several steps. Also notes mild left side pleuritic discomfort with deep breathing. Current smoker, 1/4 pack per day x45 years. Reports had NST a few years ago, which was not concerning. Denies headache, vision change, chest pressure, palpitations, syncope, nausea/vomiting, abdominal pain, hematuria, or LE swelling/pain. She follows with Dr. Kilgore for her hx breast CA, states that she underwent BMBx on 1/12 for "thick blood." Does not know results of pathology. Denies any known active cancer, FHx blood disorders, prolonged immobility, fracture, or recent surgery. Denies hx PE or DVT.    In the ED:    - VS: Tmax: 98.9, HR: 120, BP: 126/84, RR: 22, O2: 100% room air    - Pertinent Labs: wbc 13.57, CK/CKMB wnl, trop neg x2, pro-, INR 1.2    - Imaging: CXR (author read): unremarkable VQ scan: Multiple bilateral pulmonary emboli, may be several days old. EKG: aVR elevation and diffuse ST depressions likely global ischemia from PE    - Treatment/interventions: 1L NS, Lovenox 70mg x1 72F with PMH R breast cancer (2019, s/p lumpectomy XRT. on exemestane), depression, erythrocytosis (weekly phlebotomy 500cc), ?CVA who presents with 1 day of CARRILLO. Pt states that she was shopping, got to the parking lot, and felt sudden onset dyspnea with walking even several steps. Also notes mild left side pleuritic discomfort with deep breathing. Current smoker, 1/4 pack per day x45 years. Reports had NST a few years ago, which was not concerning. Denies headache, vision change, chest pressure, palpitations, syncope, nausea/vomiting, abdominal pain, hematuria, or LE swelling/pain. She follows with Dr. Kilgore for her hx breast CA, states that she underwent BMBx on 1/12 for "thick blood." Does not know results of pathology. Denies any known active cancer, FHx blood disorders, prolonged immobility, fracture, or recent surgery. Denies hx PE or DVT.    In the ED:    - VS: Tmax: 98.9, HR: 120, BP: 126/84, RR: 22, O2: 100% room air    - Pertinent Labs: wbc 13.57, CK/CKMB wnl, trop neg x2, pro-, INR 1.2    - Imaging: CXR (author read): unremarkable VQ scan: Multiple bilateral pulmonary emboli, may be several days old. EKG: sinus tachycardia, aVR elevation and diffuse ST depressions likely global ischemia from PE    - Treatment/interventions: 1L NS, Lovenox 70mg x1 72F with PMH R breast cancer (2019, s/p lumpectomy XRT. on exemestane), depression, erythrocytosis (weekly phlebotomy 500cc), ?CVA who presents with 1 day of CARRILLO. Pt states that she was shopping, got to the parking lot, and felt sudden onset dyspnea with walking even several steps. Also notes mild left side pleuritic discomfort with deep breathing. Current smoker, 1/4 pack per day x45 years. Reports had NST a few years ago, which was not concerning. Denies headache, vision change, chest pressure, palpitations, syncope, nausea/vomiting, abdominal pain, hematuria, or LE swelling/pain. She follows with Dr. Kilgore for her hx breast CA, states that she underwent BMBx on 1/12 for "thick blood." Does not know results of pathology. Denies any known active cancer, FHx blood disorders, prolonged immobility, fracture, or recent surgery. Denies hx PE or DVT.    In the ED:    - VS: Tmax: 98.9, HR: 120, BP: 126/84, RR: 22, O2: 100% room air    - Pertinent Labs: wbc 13.57, CK/CKMB wnl, trop neg x2, pro-, INR 1.2    - Imaging: CXR (author read): unremarkable VQ scan: Multiple bilateral pulmonary emboli, may be several days old. EKG: sinus tachycardia @116bpm, aVR elevation and diffuse ST depressions likely global ischemia from PE    - Treatment/interventions: 1L NS, Lovenox 70mg x1

## 2023-01-19 NOTE — ED PROVIDER NOTE - CLINICAL SUMMARY MEDICAL DECISION MAKING FREE TEXT BOX
Impression: h/o htn, r breast ca s/p lumpectomy/ rt, cva, depression, erythrocytosis requiring phlebotomy 500ml weekly, active smoker, who p/w sob starting yesterday which is worse w/ exertion. No associated chest pain. AVSS. EKG showing sinus tach, no stemi. CXR neg for i/e. Trop neg. Pt with multiple risk factors for pe and initially arranged for cta chest. Study later cancelled as pt stating she is allergic to iv contrast. Vq scan ordered instead. Repeat trop neg. Dispo pending vq scan results. Pt s/o to Dr. Gentile.

## 2023-01-19 NOTE — PATIENT PROFILE ADULT - FALL HARM RISK - HARM RISK INTERVENTIONS

## 2023-01-19 NOTE — H&P ADULT - NSHPPHYSICALEXAM_GEN_ALL_CORE
General: in no acute distress  Eyes: EOMI intact bilaterally. Anicteric sclerae, moist conjunctivae  HENT: Moist mucous membranes  Neck: Trachea midline, supple  Lungs: CTA B/L. No wheezes, rales, or rhonchi  Cardiovascular: RRR. No murmurs, rubs, or gallops  Abdomen: Soft, non-tender non-distended; No rebound or guarding  Extremities: WWP, No clubbing, cyanosis or edema  MSK: No midline bony tenderness. No CVA tenderness bilaterally  Neurological: Alert and oriented x3  Skin: Warm and dry. No obvious rash General: in no acute distress, speaking in full sentences on room air  Eyes: EOMI intact bilaterally. Anicteric sclerae, moist conjunctivae  HENT: Moist mucous membranes  Neck: Trachea midline, supple  Lungs: CTA B/L. No wheezes, rales, or rhonchi  Cardiovascular: Tachycardic. Regular rhythm. No murmurs, rubs, or gallops  Abdomen: Soft, non-tender non-distended; No rebound or guarding. +BS  Extremities: WWP, No clubbing, cyanosis or edema. No calf tenderness bilaterally, equal size/symmetry.  MSK: No midline bony tenderness. No CVA tenderness bilaterally.  Neurological: Alert and oriented x3  Skin: Warm and dry. No obvious rash

## 2023-01-19 NOTE — H&P ADULT - NSHPSOCIALHISTORY_GEN_ALL_CORE
Tobacco  Alcohol  Drug    Living situation Current tobacco use (1/4 pack per day x 45 years). Current alcohol use (two 24 oz. malt liquors and 1 beer daily). Last drink 1/19/23, no history of withdrawals. Denies illicit drug use.    Lives in apartment alone, frequent visits from daughter. Independent in ADLs/IADLs. Ambulates without assistive device.

## 2023-01-19 NOTE — ED PROVIDER NOTE - PROGRESS NOTE DETAILS
Received sign out from Dr. Hernandez pending VQ scan. Showed b/l pulmonary emboli. BP stable. No chest pain. trop x2 negative. EKG with aVR elevation and diffuse ST depressions likely global ischemic from PE. Discussed with Dr. Lopez- recommended regional admission with pulm to see patient. Dr. Gentile-  Received sign out from Dr. Brown pending VQ scan. Showed b/l pulmonary emboli. BP stable. No chest pain. trop x2 negative. EKG with aVR elevation and diffuse ST depressions likely global ischemic from PE. Discussed with Dr. Lopez- recommended regional admission with pulm to see patient. Dr. Gentile-  Received sign out from Dr. Brown pending VQ scan. Showed b/l pulmonary emboli. BP stable. No chest pain. trop x2 negative. EKG with aVR elevation and diffuse ST depressions likely global ischemic from PE. High PESI score- will need admission. Given lovenox. Discussed with Dr. Lopez- recommended regional admission with pulm to see patient.

## 2023-01-19 NOTE — H&P ADULT - PROBLEM SELECTOR PLAN 2
denies HTN Current alcohol use (two 24 oz. malt liquors and 1 beer daily). Last drink 1/19/23, no history of withdrawals.  - no concern for withdrawal at this time  - initiate CIWA if concern for withdrawal    #Tobacco use disorder  - nicotine patch daily

## 2023-01-19 NOTE — H&P ADULT - PROBLEM SELECTOR PLAN 5
pt denies hx CVA.  - small vessel ischemic disease. previously on asa. not on currently statin Per chart review. Pt denies hx CVA. Per PCP chart review, prior imaging shows small vessel ischemic disease. Previously on ASA, not on currently statin.  - PCP collateral regarding ASA/statin

## 2023-01-19 NOTE — H&P ADULT - NSICDXPASTMEDICALHX_GEN_ALL_CORE_FT
PAST MEDICAL HISTORY:  Breast CA     Depression      PAST MEDICAL HISTORY:  Breast CA     Depression     Erythrocytosis

## 2023-01-19 NOTE — ED PROVIDER NOTE - OBJECTIVE STATEMENT
Pt is a 71yo f, h/o htn, r breast ca s/p lumpectomy and rt, cva, depression, who p/w sob starting yesterday while going grocery shopping. Sob is worse w/ exertion and better at rest. No associated chest pain, palpitations, syncope, n/t/w... No leg pain, swelling, prolonged immobility. No h/o thromboembolism/ cad. Had stress test few years ago and neg. Pt is a 73yo f, h/o htn, r breast ca s/p lumpectomy and rt, cva, depression, who p/w sob starting yesterday while going grocery shopping. Sob is worse w/ exertion and better at rest. + dizziness/ lightheadedness. No associated chest pain, palpitations, syncope, n/t/w... No leg pain, swelling, prolonged immobility. No h/o thromboembolism/ cad. Had stress test few years ago and neg. Pt is a 73yo f, h/o htn, r breast ca s/p lumpectomy and rt, cva, depression, erythrocytosis requiring phlebotomy 500ml weekly, active smoker, who p/w sob starting yesterday while going grocery shopping. Sob is worse w/ exertion and better at rest. + dizziness/ lightheadedness. No associated chest pain, palpitations, syncope, n/t/w... No leg pain, swelling, prolonged immobility. No h/o thromboembolism/ cad. Had stress test few years ago and neg.

## 2023-01-19 NOTE — H&P ADULT - PROBLEM SELECTOR PLAN 4
pt denies - wellbutrin XR 300mg daily  - trazodone 200mg qHS  - gabapentin 200mg qHS Takes home wellbutrin, trazodone, gabapentin.  - continue home wellbutrin XR 300mg daily  - continue home trazodone 200mg qHS  - continue home gabapentin 200mg qHS

## 2023-01-19 NOTE — ED ADULT TRIAGE NOTE - OTHER COMPLAINTS
Daughter reports patient also c/o dizziness and was having trembling to bilateral hands. Denies chest pain. Daughter reports patient also c/o dizziness, feeling lightheaded, headache and was having trembling to bilateral hands. Denies chest pain.

## 2023-01-19 NOTE — ED ADULT NURSE NOTE - OTHER COMPLAINTS
Daughter reports patient also c/o dizziness, feeling lightheaded, headache and was having trembling to bilateral hands. Denies chest pain.

## 2023-01-19 NOTE — H&P ADULT - PROBLEM SELECTOR PLAN 6
F: Tolerating PO, no IVF  E: Replete K<4, Mg<2  N: (DIET)  VTE Ppx: Lovenox 40mg SQ q24h  GI: not needed  C: Full Code  D: (LOCATION)    Problem: Nutrition, metabolism, and development symptoms F: Tolerating PO, no IVF  E: Replete K<4, Mg<2  N: DASH/TLC diet  VTE Ppx: Lovenox 70mg SQ q12h  GI: not needed    C: Full Code  D: RMF    Problem: Nutrition, metabolism, and development symptoms

## 2023-01-19 NOTE — H&P ADULT - NSHPLABSRESULTS_GEN_ALL_CORE
LABS:                         15.3   13.57 >-----< 296           ( 01-19-23 @ 10:38 )             46.0       139  |  101  |   10  ----------------------< 138    (01-19-23 @ 10:38)     4.0  |  28  |  0.93    Anion Gap: 10    Ca   10.0   (01-19-23 @ 10:38)  Mg   x    (01-19-23 @ 10:38)  Phos x    (01-19-23 @ 10:38)       TP 10.0     |  AST x   -------------------------     Alb x     |  ALT x               (01-19-23 @ 10:38)  -------------------------     T-bili x  |  AlkPh 105    D-bili x     COAGULATION STUDIES:     aPTT  30.9 sec    (01-19-23 @ 10:38)     PT     14.3 sec    (01-19-23 @ 10:38)     INR    1.20          (01-19-23 @ 10:38)     I/O SUMMARY:

## 2023-01-19 NOTE — ED ADULT NURSE NOTE - OBJECTIVE STATEMENT
.  72years female alert mental state (AOX3) received on foot.  -complain of SOB.  pt started vertigo, headache, coughing, and SOB since yesterday.   -denied chest pain, n/v/d, abdomen pain, fever.  Pt is in the bed comfortably at this time. Will continue to monitor and document any changes.

## 2023-01-20 ENCOUNTER — TRANSCRIPTION ENCOUNTER (OUTPATIENT)
Age: 73
End: 2023-01-20

## 2023-01-20 DIAGNOSIS — Z78.9 OTHER SPECIFIED HEALTH STATUS: ICD-10-CM

## 2023-01-20 LAB
ALBUMIN SERPL ELPH-MCNC: 3.6 G/DL — SIGNIFICANT CHANGE UP (ref 3.3–5)
ALP SERPL-CCNC: 91 U/L — SIGNIFICANT CHANGE UP (ref 40–120)
ALT FLD-CCNC: 11 U/L — SIGNIFICANT CHANGE UP (ref 10–45)
ANION GAP SERPL CALC-SCNC: 14 MMOL/L — SIGNIFICANT CHANGE UP (ref 5–17)
AST SERPL-CCNC: 15 U/L — SIGNIFICANT CHANGE UP (ref 10–40)
BASOPHILS # BLD AUTO: 0.07 K/UL — SIGNIFICANT CHANGE UP (ref 0–0.2)
BASOPHILS NFR BLD AUTO: 0.5 % — SIGNIFICANT CHANGE UP (ref 0–2)
BILIRUB SERPL-MCNC: 1.2 MG/DL — SIGNIFICANT CHANGE UP (ref 0.2–1.2)
BUN SERPL-MCNC: 8 MG/DL — SIGNIFICANT CHANGE UP (ref 7–23)
CALCIUM SERPL-MCNC: 9.6 MG/DL — SIGNIFICANT CHANGE UP (ref 8.4–10.5)
CHLORIDE SERPL-SCNC: 102 MMOL/L — SIGNIFICANT CHANGE UP (ref 96–108)
CO2 SERPL-SCNC: 23 MMOL/L — SIGNIFICANT CHANGE UP (ref 22–31)
CREAT SERPL-MCNC: 0.72 MG/DL — SIGNIFICANT CHANGE UP (ref 0.5–1.3)
EGFR: 90 ML/MIN/1.73M2 — SIGNIFICANT CHANGE UP
EOSINOPHIL # BLD AUTO: 0.02 K/UL — SIGNIFICANT CHANGE UP (ref 0–0.5)
EOSINOPHIL NFR BLD AUTO: 0.2 % — SIGNIFICANT CHANGE UP (ref 0–6)
GLUCOSE SERPL-MCNC: 104 MG/DL — HIGH (ref 70–99)
HCT VFR BLD CALC: 40.9 % — SIGNIFICANT CHANGE UP (ref 34.5–45)
HCV AB S/CO SERPL IA: 0.04 S/CO — SIGNIFICANT CHANGE UP
HCV AB SERPL-IMP: SIGNIFICANT CHANGE UP
HGB BLD-MCNC: 13.7 G/DL — SIGNIFICANT CHANGE UP (ref 11.5–15.5)
IMM GRANULOCYTES NFR BLD AUTO: 0.2 % — SIGNIFICANT CHANGE UP (ref 0–0.9)
LYMPHOCYTES # BLD AUTO: 2.79 K/UL — SIGNIFICANT CHANGE UP (ref 1–3.3)
LYMPHOCYTES # BLD AUTO: 21.4 % — SIGNIFICANT CHANGE UP (ref 13–44)
MAGNESIUM SERPL-MCNC: 1.7 MG/DL — SIGNIFICANT CHANGE UP (ref 1.6–2.6)
MCHC RBC-ENTMCNC: 30.1 PG — SIGNIFICANT CHANGE UP (ref 27–34)
MCHC RBC-ENTMCNC: 33.5 GM/DL — SIGNIFICANT CHANGE UP (ref 32–36)
MCV RBC AUTO: 89.9 FL — SIGNIFICANT CHANGE UP (ref 80–100)
MONOCYTES # BLD AUTO: 1.14 K/UL — HIGH (ref 0–0.9)
MONOCYTES NFR BLD AUTO: 8.8 % — SIGNIFICANT CHANGE UP (ref 2–14)
NEUTROPHILS # BLD AUTO: 8.97 K/UL — HIGH (ref 1.8–7.4)
NEUTROPHILS NFR BLD AUTO: 68.9 % — SIGNIFICANT CHANGE UP (ref 43–77)
NRBC # BLD: 0 /100 WBCS — SIGNIFICANT CHANGE UP (ref 0–0)
PHOSPHATE SERPL-MCNC: 2.8 MG/DL — SIGNIFICANT CHANGE UP (ref 2.5–4.5)
PLATELET # BLD AUTO: 275 K/UL — SIGNIFICANT CHANGE UP (ref 150–400)
POTASSIUM SERPL-MCNC: 3.8 MMOL/L — SIGNIFICANT CHANGE UP (ref 3.5–5.3)
POTASSIUM SERPL-SCNC: 3.8 MMOL/L — SIGNIFICANT CHANGE UP (ref 3.5–5.3)
PROT SERPL-MCNC: 7.1 G/DL — SIGNIFICANT CHANGE UP (ref 6–8.3)
RBC # BLD: 4.55 M/UL — SIGNIFICANT CHANGE UP (ref 3.8–5.2)
RBC # FLD: 13.8 % — SIGNIFICANT CHANGE UP (ref 10.3–14.5)
SODIUM SERPL-SCNC: 139 MMOL/L — SIGNIFICANT CHANGE UP (ref 135–145)
WBC # BLD: 13.02 K/UL — HIGH (ref 3.8–10.5)
WBC # FLD AUTO: 13.02 K/UL — HIGH (ref 3.8–10.5)

## 2023-01-20 PROCEDURE — 93970 EXTREMITY STUDY: CPT | Mod: 26

## 2023-01-20 PROCEDURE — 99233 SBSQ HOSP IP/OBS HIGH 50: CPT

## 2023-01-20 PROCEDURE — 93010 ELECTROCARDIOGRAM REPORT: CPT

## 2023-01-20 PROCEDURE — 99222 1ST HOSP IP/OBS MODERATE 55: CPT

## 2023-01-20 PROCEDURE — 93306 TTE W/DOPPLER COMPLETE: CPT | Mod: 26

## 2023-01-20 RX ORDER — POTASSIUM CHLORIDE 20 MEQ
20 PACKET (EA) ORAL ONCE
Refills: 0 | Status: COMPLETED | OUTPATIENT
Start: 2023-01-20 | End: 2023-01-20

## 2023-01-20 RX ORDER — APIXABAN 2.5 MG/1
10 TABLET, FILM COATED ORAL EVERY 12 HOURS
Refills: 0 | Status: DISCONTINUED | OUTPATIENT
Start: 2023-01-20 | End: 2023-01-21

## 2023-01-20 RX ORDER — TRAZODONE HCL 50 MG
200 TABLET ORAL AT BEDTIME
Refills: 0 | Status: DISCONTINUED | OUTPATIENT
Start: 2023-01-20 | End: 2023-01-21

## 2023-01-20 RX ORDER — POLYETHYLENE GLYCOL 3350 17 G/17G
17 POWDER, FOR SOLUTION ORAL EVERY 24 HOURS
Refills: 0 | Status: DISCONTINUED | OUTPATIENT
Start: 2023-01-21 | End: 2023-01-21

## 2023-01-20 RX ORDER — EXEMESTANE 25 MG/1
25 TABLET, SUGAR COATED ORAL DAILY
Refills: 0 | Status: DISCONTINUED | OUTPATIENT
Start: 2023-01-20 | End: 2023-01-21

## 2023-01-20 RX ORDER — APIXABAN 2.5 MG/1
10 TABLET, FILM COATED ORAL EVERY 12 HOURS
Refills: 0 | Status: DISCONTINUED | OUTPATIENT
Start: 2023-01-20 | End: 2023-01-20

## 2023-01-20 RX ORDER — NICOTINE POLACRILEX 2 MG
1 GUM BUCCAL EVERY 24 HOURS
Refills: 0 | Status: DISCONTINUED | OUTPATIENT
Start: 2023-01-20 | End: 2023-01-21

## 2023-01-20 RX ORDER — SENNA PLUS 8.6 MG/1
2 TABLET ORAL AT BEDTIME
Refills: 0 | Status: DISCONTINUED | OUTPATIENT
Start: 2023-01-20 | End: 2023-01-21

## 2023-01-20 RX ORDER — MAGNESIUM SULFATE 500 MG/ML
1 VIAL (ML) INJECTION ONCE
Refills: 0 | Status: COMPLETED | OUTPATIENT
Start: 2023-01-20 | End: 2023-01-20

## 2023-01-20 RX ORDER — POLYETHYLENE GLYCOL 3350 17 G/17G
17 POWDER, FOR SOLUTION ORAL ONCE
Refills: 0 | Status: COMPLETED | OUTPATIENT
Start: 2023-01-20 | End: 2023-01-20

## 2023-01-20 RX ADMIN — POLYETHYLENE GLYCOL 3350 17 GRAM(S): 17 POWDER, FOR SOLUTION ORAL at 13:23

## 2023-01-20 RX ADMIN — APIXABAN 10 MILLIGRAM(S): 2.5 TABLET, FILM COATED ORAL at 17:16

## 2023-01-20 RX ADMIN — Medication 200 MILLIGRAM(S): at 00:16

## 2023-01-20 RX ADMIN — Medication 20 MILLIEQUIVALENT(S): at 12:03

## 2023-01-20 RX ADMIN — ENOXAPARIN SODIUM 70 MILLIGRAM(S): 100 INJECTION SUBCUTANEOUS at 06:44

## 2023-01-20 RX ADMIN — Medication 1 PATCH: at 07:54

## 2023-01-20 RX ADMIN — Medication 1 PATCH: at 06:45

## 2023-01-20 RX ADMIN — Medication 1 PATCH: at 17:21

## 2023-01-20 RX ADMIN — Medication 200 MILLIGRAM(S): at 22:33

## 2023-01-20 RX ADMIN — Medication 100 GRAM(S): at 12:03

## 2023-01-20 RX ADMIN — GABAPENTIN 200 MILLIGRAM(S): 400 CAPSULE ORAL at 22:34

## 2023-01-20 RX ADMIN — GABAPENTIN 200 MILLIGRAM(S): 400 CAPSULE ORAL at 00:16

## 2023-01-20 RX ADMIN — BUPROPION HYDROCHLORIDE 300 MILLIGRAM(S): 150 TABLET, EXTENDED RELEASE ORAL at 12:03

## 2023-01-20 NOTE — CONSULT NOTE ADULT - ATTENDING COMMENTS
Resected breast cancer, on anastrazole, w erythrocytosis, admitted w PE. Agree w Eliquis and will cont to follow w Dr Kilgore.

## 2023-01-20 NOTE — DISCHARGE NOTE PROVIDER - NSDCMRMEDTOKEN_GEN_ALL_CORE_FT
aspirin 81 mg oral tablet: 1 milligram(s) orally once a day  buPROPion 300 mg/24 hours (XL) oral tablet, extended release: 1 tab(s) orally every 24 hours  gabapentin 100 mg oral capsule: 2 cap(s) orally once a day (at bedtime)   ramelteon 8 mg oral tablet: 1 tab(s) orally once a day (at bedtime)   traZODone 100 mg oral tablet: 2 tab(s) orally once a day (at bedtime), As Needed  - for insomnia PLEASE CALL PT TO     aspirin 81 mg oral tablet: 1 milligram(s) orally once a day  buPROPion 300 mg/24 hours (XL) oral tablet, extended release: 1 tab(s) orally every 24 hours  Eliquis Starter Pack for Treatment of DVT and PE 5 mg oral tablet: 2 tab(s) orally every 12 hours until 1/27  1 tab every 12 hours after that indefinitely   exemestane 25 mg oral tablet: 1 tab(s) orally once a day  gabapentin 100 mg oral capsule: 2 cap(s) orally once a day (at bedtime)   ramelteon 8 mg oral tablet: 1 tab(s) orally once a day (at bedtime)   traZODone 100 mg oral tablet: 2 tab(s) orally once a day (at bedtime), As Needed  - for insomnia PLEASE CALL PT TO     aspirin 81 mg oral tablet: 1 milligram(s) orally once a day  buPROPion 300 mg/24 hours (XL) oral tablet, extended release: 1 tab(s) orally every 24 hours  Eliquis Starter Pack for Treatment of DVT and PE 5 mg oral tablet: 2 tab(s) orally every 12 hours until 1/27  1 tab every 12 hours after that indefinitely   exemestane 25 mg oral tablet: 1 tab(s) orally once a day  gabapentin 100 mg oral capsule: 2 cap(s) orally once a day (at bedtime)   nicotine 7 mg/24 hr transdermal film, extended release: 1 patch transdermal once a day   ramelteon 8 mg oral tablet: 1 tab(s) orally once a day (at bedtime)   traZODone 100 mg oral tablet: 2 tab(s) orally once a day (at bedtime), As Needed  - for insomnia PLEASE CALL PT TO

## 2023-01-20 NOTE — PROGRESS NOTE ADULT - PROBLEM SELECTOR PLAN 5
Per chart review. Pt denies hx CVA. Per PCP chart review, prior imaging shows small vessel ischemic disease. Patient states she takes ASA 81 po qd, not on currently statin.  - resume aspirin on dc

## 2023-01-20 NOTE — DISCHARGE NOTE PROVIDER - NSDCCPTREATMENT_GEN_ALL_CORE_FT
PRINCIPAL PROCEDURE  Procedure: Radionuclide ventilation and perfusion scan of pulmonary system with inhaled and intravenous radionuclide  Findings and Treatment: Pulmonary ventilation perfusion scan (nuclear medicine)  Performed 1/19/2023  Findings:  There are multiple mismatching defects throughout both lungs in a pattern typical of multiple pulmonary emboli.  The defects include a large defect in the left lung that appears to represent the entire inferior lingular segment with smaller mismatching defects in the upper lobe and at least 2 large mismatching defects in the right lung, probably including portions of the lateral segment of the middle lobe and some of the basal segments of the right lower lobe.  The indistinct nature of some of the defects suggest that some may be several days old.  Impression: Multiple bilateral pulmonary emboli. The shape of some of   the emboli suggests that they may be several days old because of rounded edges.        SECONDARY PROCEDURE  Procedure: Doppler ultrasound of vein of both lower extremities  Findings and Treatment: Performed 1/20/2023  FINDINGS:  RIGHT:  Normal compressibility of the RIGHT common femoral, femoral and popliteal veins.  Doppler examination shows normal spontaneous and phasic flow.  No RIGHT calf vein thrombosis is detected.  LEFT:  Normal compressibility of the LEFT common femoral, femoral and popliteal veins.  Doppler examination shows normal spontaneous and phasic flow.  Deep venous thrombosis of an intramuscular calf vein.  IMPRESSION:  1.  Acute deep venous thrombosis of a LEFT intramuscular calf vein.  2.  No evidence of deep venous thrombosis in the RIGHT lower extremity.    Procedure: Transthoracic echocardiography (TTE)  Findings and Treatment: Performed 1/20/2023  CONCLUSIONS:   1. Normal left ventricular systolic function.   2. Mildly dilated right ventricular size.   3. Normal right ventricular systolic function.   4. Moderate tricuspid regurgitation.   5. Pulmonary hypertension present, pulmonary artery systolic pressure is 58 mmHg.   6. No pericardial effusion.

## 2023-01-20 NOTE — CONSULT NOTE ADULT - SUBJECTIVE AND OBJECTIVE BOX
Patient is a 72y old  Female who presents with a chief complaint of pulmonary embolism (19 Jan 2023 20:44)        HPI:  72F with PMH R breast cancer (2019, s/p lumpectomy XRT. on exemestane), depression, erythrocytosis (weekly phlebotomy 500cc), ?CVA who presents with 1 day of CARRILLO. Pt states that she was shopping, got to the parking lot, and felt sudden onset dyspnea with walking even several steps. Also notes mild left side pleuritic discomfort with deep breathing. Current smoker, 1/4 pack per day x45 years. Reports had NST a few years ago, which was not concerning. Denies headache, vision change, chest pressure, palpitations, syncope, nausea/vomiting, abdominal pain, hematuria, or LE swelling/pain. She follows with Dr. Kilgore for her hx breast CA, states that she underwent BMBx on 1/12 for "thick blood." Does not know results of pathology. Denies any known active cancer, FHx blood disorders, prolonged immobility, fracture, or recent surgery. Denies hx PE or DVT.    In the ED:    - VS: Tmax: 98.9, HR: 120, BP: 126/84, RR: 22, O2: 100% room air    - Pertinent Labs: wbc 13.57, CK/CKMB wnl, trop neg x2, pro-, INR 1.2    - Imaging: CXR (author read): unremarkable VQ scan: Multiple bilateral pulmonary emboli, may be several days old. EKG: sinus tachycardia @116bpm, aVR elevation and diffuse ST depressions likely global ischemia from PE    - Treatment/interventions: 1L NS, Lovenox 70mg x1 (19 Jan 2023 20:44)      PAST MEDICAL & SURGICAL HISTORY:  Depression      Breast CA      Erythrocytosis      Eardrum rupture, left      Hernia, umbilical  as a child      Foot pain  bilateral bone between pinky toe on the left and the right      Lesion of eye  left eye stye      H/O lumpectomy  right- cancer          MEDICATIONS  (STANDING):  apixaban 10 milliGRAM(s) Oral every 12 hours  buPROPion XL (24-Hour) . 300 milliGRAM(s) Oral daily  gabapentin 200 milliGRAM(s) Oral at bedtime  nicotine -   7 mG/24Hr(s) Patch 1 Patch Transdermal every 24 hours  traZODone 200 milliGRAM(s) Oral at bedtime    MEDICATIONS  (PRN):  LORazepam   Injectable 1 milliGRAM(s) IV Push every 2 hours PRN CIWA-Ar score increase by 2 points and a total score of 7 or less  melatonin 5 milliGRAM(s) Oral at bedtime PRN Insomnia           FAMILY HISTORY:    CBC Full  -  ( 20 Jan 2023 07:56 )  WBC Count : 13.02 K/uL  RBC Count : 4.55 M/uL  Hemoglobin : 13.7 g/dL  Hematocrit : 40.9 %  Platelet Count - Automated : 275 K/uL  Mean Cell Volume : 89.9 fl  Mean Cell Hemoglobin : 30.1 pg  Mean Cell Hemoglobin Concentration : 33.5 gm/dL  Auto Neutrophil # : 8.97 K/uL  Auto Lymphocyte # : 2.79 K/uL  Auto Monocyte # : 1.14 K/uL  Auto Eosinophil # : 0.02 K/uL  Auto Basophil # : 0.07 K/uL  Auto Neutrophil % : 68.9 %  Auto Lymphocyte % : 21.4 %  Auto Monocyte % : 8.8 %  Auto Eosinophil % : 0.2 %  Auto Basophil % : 0.5 %      01-20    139  |  102  |  8   ----------------------------<  104<H>  3.8   |  23  |  0.72    Ca    9.6      20 Jan 2023 07:56  Phos  2.8     01-20  Mg     1.7     01-20    TPro  7.1  /  Alb  3.6  /  TBili  1.2  /  DBili  x   /  AST  15  /  ALT  11  /  AlkPhos  91  01-20            Radiology :     < from: Xray Chest 1 View AP/PA (01.19.23 @ 14:02) >    ACC: [id number] EXAM:  XR CHEST AP OR PA 1V   ORDERED BY: JUNE H REE     PROCEDURE DATE:  01/19/2023          INTERPRETATION:  XR CHEST dated 1/19/2023 2:02 PM    HISTORY: Shortness of breath    COMPARISON: Chest radiograph from 2/26/2020    FINDINGS: The lungs are clear. There are no pleural effusions. The   cardiomediastinal silhouette is unremarkable. Aortic knob calcifications.   No acute osseous or soft tissue abnormality.      IMPRESSION: No radiographic evidence of active cardiopulmonary disease.                  Vital Signs Last 24 Hrs  T(C): 37.8 (20 Jan 2023 05:55), Max: 37.8 (20 Jan 2023 05:55)  T(F): 100 (20 Jan 2023 05:55), Max: 100 (20 Jan 2023 05:55)  HR: 115 (20 Jan 2023 05:55) (98 - 120)  BP: 124/83 (20 Jan 2023 05:55) (124/83 - 157/88)  BP(mean): 111 (19 Jan 2023 22:15) (111 - 111)  RR: 18 (20 Jan 2023 05:55) (16 - 22)  SpO2: 99% (20 Jan 2023 05:55) (95% - 103%)    Parameters below as of 20 Jan 2023 05:55  Patient On (Oxygen Delivery Method): room air            REVIEW OF SYSTEMS:      CONSTITUTIONAL: No fever, weight loss, or fatigue  EYES: No eye pain, visual disturbances, or discharge  ENMT:  No difficulty hearing, tinnitus, vertigo; No sinus or throat pain  NECK: No pain or stiffness  BREASTS: No pain, masses, or nipple discharge  RESPIRATORY:  per hpi   CARDIOVASCULAR: No chest pain, palpitations, dizziness, or leg swelling  GASTROINTESTINAL: No abdominal or epigastric pain. No nausea, vomiting, or hematemesis; No diarrhea or constipation. No melena or hematochezia.  GENITOURINARY: No dysuria, frequency, hematuria, or incontinence  NEUROLOGICAL: No headaches, memory loss, loss of strength, numbness, or tremors  SKIN: No itching, burning, rashes, or lesions   LYMPH NODES: No enlarged glands  ENDOCRINE: No heat or cold intolerance; No hair loss  MUSCULOSKELETAL: No joint pain or swelling; No muscle, back, or extremity pain  PSYCHIATRIC: No depression, anxiety, mood swings, or difficulty sleeping  HEME/LYMPH: No easy bruising, or bleeding gums  ALLERGY AND IMMUNOLOGIC: No hives or eczema  VASCULAR: per hpi           Physical Exam :  72 y o woman lying comfortably in semi Infante's position , awake , alert , no current complaints     Head : normocephalic , atraumatic    Eyes : PERRLA , EOMI , no nystagmus , sclera anicteric    ENT : nasal discharge , uvula midline , no oropharyngeal erythema / exudate    Neck : supple , negative JVD , negative carotid bruits , no thyromegaly    Chest : CTA bilaterally , neg wheeze / rhonchi / rales / crackles / egophany    Cardiovascular : regular rate and rhythm , neg murmurs / rubs / gallops    Abdomen : soft , non distended , non tender to palpation in all 4 quadrants , normal bowel sounds , negative rebound / guarding     Extremities : WWP , neg cyanosis /clubbing / edema     Neurologic Exam :    Alert and oriented  x 3     Motor Exam:          Right UE:               no focal weakness ,  > 4/5 throughout                                Left UE:                 no focal weakness ,  > 4/5 throughout       Right LE:                no focal weakness ,  > 4/5 throughout     Left LE:                  no focal weakness ,  > 4/5 throughout           Sensation :         intact to light touch x 4 extremities                         DTR :                     biceps/brachioradialis : equal                                              patella/ankle : equal       Gait :  not tested        PM&R Impression :     1) admitted for acute bilateral PE       Recommendations / Plan :     1) Physical / Occupational therapy focusing on therapeutic exercises , equipment evaluation , bed mobility/transfer out of bed evaluation , progressive ambulation with assistive devices prn .    2) Current disposition plan recommendation  :  d/c home

## 2023-01-20 NOTE — PHYSICAL THERAPY INITIAL EVALUATION ADULT - PREDICTED DURATION OF THERAPY (DAYS/WKS), PT EVAL
Pt. would benefit from further agit, balance, endurance training and assessment of stairs negotiation.

## 2023-01-20 NOTE — PHYSICAL THERAPY INITIAL EVALUATION ADULT - ADDITIONAL COMMENTS
5 steps to enter, no AD, previously fully independent, no home care services, no adaptive equipment.

## 2023-01-20 NOTE — DISCHARGE NOTE PROVIDER - NSDCFUSCHEDAPPT_GEN_ALL_CORE_FT
Atul Bardales  [facility]  PSYCHIATRY  [address]  Scheduled Appointment: 02/03/2023    Miguel Diggs  [facility]  NEUROLOGY [address]  Scheduled Appointment: 03/15/2023

## 2023-01-20 NOTE — DISCHARGE NOTE PROVIDER - CARE PROVIDER_API CALL
Aleyda Kilgore  HEMATOLOGY  [address], [address]  [place] [address]  Phone: [phone]  Fax: [phone]  Scheduled Appointment: 02/01/2023 12:00 PM

## 2023-01-20 NOTE — CONSULT NOTE ADULT - ASSESSMENT
per Internal Medicine    72 y o F with PMH R breast cancer (2019, s/p lumpectomy XRT. on exemestane), depression, erythrocytosis (weekly phlebotomy 500cc), ?CVA who presents with 1 day of CARRILLO, found to have evidence of multiple bilateral pulmonary emboli, admitted for further management.    Problem/Plan - 1:  ·  Problem: Pulmonary embolism.   ·  Plan: Sedentary lifestyle with hx breast CA (s/p lumpectomy/XRT, on daily exemestane.) colon CA screening UTD (2016 wnl). denies FHx blood disorders. s/p BMBx on 1/12 with Dr. Kilgore for erythrocytosis, path pending.  VQ scan showing bilateral PE, HD stable.  no chest pain. trop neg x2. EKG with aVR elevation and diffuse ST depressions likely global ischemic from PE.   High PESI score (class IV, indicating 4-11.4% 30 day mortality).  - lovenox 70mg q12h, transition to Eliquis when appropriate  - pulmonology consult in AM.    Problem/Plan - 2:  ·  Problem: Alcohol use.   ·  Plan: Current alcohol use (two 24 oz. malt liquors and 1 beer daily). Last drink 1/19/23, no history of withdrawals.  - no concern for withdrawal at this time  - initiate CIWA if concern for withdrawal    #Tobacco use disorder  - nicotine patch daily.    Problem/Plan - 3:  ·  Problem: Breast cancer.   ·  Plan: R breast CA s/p lumpectomy/XRT. On daily PO exemestane (aromatase inhibitor), follows with Dr. Kilgore.  - contact Dr. Kilgore in AM about resuming exemestane    #Erythrocytosis  follows with Dr. Kilgore, gets weekly phlebotomy. Hgb 15.3 on admission.  - outpatient follow up.    Problem/Plan - 4:  ·  Problem: Major depression.   ·  Plan: Takes home wellbutrin, trazodone, gabapentin.  - continue home wellbutrin XR 300mg daily  - continue home trazodone 200mg qHS  - continue home gabapentin 200mg qHS.    Problem/Plan - 5:  ·  Problem: History of CVA (cerebrovascular accident).   ·  Plan: Per chart review. Pt denies hx CVA. Per PCP chart review, prior imaging shows small vessel ischemic disease. Previously on ASA, not on currently statin.  - PCP collateral regarding ASA/statin.    Problem/Plan - 6:  ·  Problem: Need for prophylactic measure.   ·  Plan: F: Tolerating PO, no IVF  E: Replete K<4, Mg<2  N: DASH/TLC diet  VTE Ppx: Lovenox 70mg SQ q12h  GI: not needed    C: Full Code  D: RMF

## 2023-01-20 NOTE — DISCHARGE NOTE PROVIDER - ATTENDING DISCHARGE PHYSICAL EXAMINATION:
72F with PMH of breast cancer, depression and erythrocytosis (gets weekly phlebotomy) presenting with CP and CARRILLO, noted to have multiple PEs, and seen by PERT team.     Physical exam  General: complaining of some chest pain (similar to what brought her to the hospital)  HEENT: normocephalic, atraumatic, MMM  Cards: RRR, normal S1/S2, no mrg  Pulm: CTAB, no wheeze, crackles, rales or rhonchi  Ab: soft, nontender, nondistended, normoactive bowel sounds  Ext: wwp, no edema, erythema or asymmetry  Neuro: grossly nonfocal exam, follows commands, speech is fluent    - continue with eliquis for PE  - follow up TTE and lower extremity dopplers - no acute pathology  - follow up with oncology about restarting oral chemotherapy - OK to restart  - anticipate discharge today    >55 minutes spent on this encounter, including face to face with patient, documentation and care coordination.

## 2023-01-20 NOTE — PROGRESS NOTE ADULT - SUBJECTIVE AND OBJECTIVE BOX
OVERNIGHT EVENTS: no acute events    SUBJECTIVE / INTERVAL HPI: Patient seen and examined at bedside. Patient complains of intermittent L sided chest pain. Otherwise breathing is improved from previous. Notes constipation, no BM in two days. Denies fever, chills, HA, CP, SOB, n/v, changes in urinary habits. 12 pt ROS otherwise negative.     VITAL SIGNS:  Vital Signs Last 24 Hrs  T(C): 37.3 (20 Jan 2023 11:30), Max: 37.8 (20 Jan 2023 05:55)  T(F): 99.1 (20 Jan 2023 11:30), Max: 100 (20 Jan 2023 05:55)  HR: 119 (20 Jan 2023 11:30) (98 - 119)  BP: 141/86 (20 Jan 2023 11:30) (124/83 - 157/88)  BP(mean): 111 (19 Jan 2023 22:15) (111 - 111)  RR: 19 (20 Jan 2023 11:30) (16 - 20)  SpO2: 100% (20 Jan 2023 11:30) (96% - 103%)    Parameters below as of 20 Jan 2023 11:30  Patient On (Oxygen Delivery Method): room air      PHYSICAL EXAM:  General: in no acute distress, speaking in full sentences on room air  Eyes: EOMI intact bilaterally. Anicteric sclerae, moist conjunctivae  HENT: Moist mucous membranes  Neck: Trachea midline, supple  Lungs: CTA B/L. No wheezes, rales, or rhonchi  Cardiovascular: Tachycardic. Regular rhythm. No murmurs, rubs, or gallops  Abdomen: Soft, non-tender non-distended; No rebound or guarding. +BSx4  Extremities: WWP, No clubbing, cyanosis or edema. No calf tenderness bilaterally, equal size/symmetry.  MSK: No midline bony tenderness. No CVA tenderness bilaterally.  Neurological: Alert and oriented x3  Skin: Warm and dry. No obvious rash      MEDICATIONS:  MEDICATIONS  (STANDING):  apixaban 10 milliGRAM(s) Oral every 12 hours  buPROPion XL (24-Hour) . 300 milliGRAM(s) Oral daily  gabapentin 200 milliGRAM(s) Oral at bedtime  nicotine -   7 mG/24Hr(s) Patch 1 Patch Transdermal every 24 hours  senna 2 Tablet(s) Oral at bedtime  traZODone 200 milliGRAM(s) Oral at bedtime    MEDICATIONS  (PRN):  LORazepam   Injectable 1 milliGRAM(s) IV Push every 2 hours PRN CIWA-Ar score increase by 2 points and a total score of 7 or less  melatonin 5 milliGRAM(s) Oral at bedtime PRN Insomnia      ALLERGIES:  Allergies    bactritacine (Rash)  iodine containing compounds (Anaphylaxis)  IV Contrast (Anaphylaxis)  penicillin (Other)  shellfish (Anaphylaxis)    Intolerances        LABS:                        13.7   13.02 )-----------( 275      ( 20 Jan 2023 07:56 )             40.9     01-20    139  |  102  |  8   ----------------------------<  104<H>  3.8   |  23  |  0.72    Ca    9.6      20 Jan 2023 07:56  Phos  2.8     01-20  Mg     1.7     01-20    TPro  7.1  /  Alb  3.6  /  TBili  1.2  /  DBili  x   /  AST  15  /  ALT  11  /  AlkPhos  91  01-20    PT/INR - ( 19 Jan 2023 10:38 )   PT: 14.3 sec;   INR: 1.20          PTT - ( 19 Jan 2023 10:38 )  PTT:30.9 sec    CAPILLARY BLOOD GLUCOSE          RADIOLOGY & ADDITIONAL TESTS: Reviewed.

## 2023-01-20 NOTE — PHYSICAL THERAPY INITIAL EVALUATION ADULT - PERTINENT HX OF CURRENT PROBLEM, REHAB EVAL
Pt. is a 72 y.o female with h/o breast CA, currently on chemo, p/w 1 day of CARRILLO; diagnised with bilat PEs and LLE DVT, started on Lovenox and subsequently on Eliquis.

## 2023-01-20 NOTE — CONSULT NOTE ADULT - SUBJECTIVE AND OBJECTIVE BOX
PULMONARY SERVICE INITIAL CONSULT NOTE    HPI:  72F with history of Stage I breast CA, currently on anastrazole, diagnosed recently with erythrocytosis being managed with phlebotomy and s/p bone marrow biopsy 1/11/2023 without clear diagnosis who presents after episode of chest pain. Was out shopping picking up groceries when she began to experience acute onset dyspnea with acute onset pleuritic chest pain for which she presented to the ED. Was found to have Pulmonary embolus for which pulmoary has been consulted. Patient currently experiencing some dyspnea on exertion with ambulation.       REVIEW OF SYSTEMS:  Constitutional: No fever, weight loss or fatigue  Eyes: No eye pain, visual disturbances, or discharge  ENMT:  No difficulty hearing, tinnitus, vertigo; No sinus or throat pain  Neck: No pain, stiffness or neck swelling  Respiratory: see HPI  Cardiovascular: No chest pain, palpitations, dizziness or leg swelling  Gastrointestinal: No abdominal or epigastric pain. No nausea, vomiting or hematemesis; No diarrhea or constipation. No melena or hematochezia.  Genitourinary: No dysuria, frequency, hematuria or incontinence  Neurological: No headaches, memory loss, loss of strength, numbness or tremors  Skin: No itching, burning, rashes or lesions   Lymph Nodes: No enlarged glands  Endocrine: No heat or cold intolerance; No hair loss  Musculoskeletal: No joint pain or swelling; No muscle, back or extremity pain  Psychiatric: No depression, anxiety, mood swings or difficulty sleeping  Heme/Lymph: No easy bruising or bleeding gums  Allergy and Immunologic: No hives or eczema    PAST MEDICAL & SURGICAL HISTORY:  Depression      Breast CA      Erythrocytosis      Eardrum rupture, left      Hernia, umbilical  as a child      Foot pain  bilateral bone between pinky toe on the left and the right      Lesion of eye  left eye stye      H/O lumpectomy  right- cancer          FAMILY HISTORY:      SOCIAL HISTORY:  Smoking Status: [ ] Current, [ ] Former, [x] Never  Pack Years:    MEDICATIONS:  Pulmonary:    Antimicrobials:    Anticoagulants:  apixaban 10 milliGRAM(s) Oral every 12 hours    Onc:  exemestane 25 milliGRAM(s) Oral daily    GI/:  senna 2 Tablet(s) Oral at bedtime    Endocrine:    Cardiac:    Other Medications:  buPROPion XL (24-Hour) . 300 milliGRAM(s) Oral daily  gabapentin 200 milliGRAM(s) Oral at bedtime  LORazepam   Injectable 1 milliGRAM(s) IV Push every 2 hours PRN  melatonin 5 milliGRAM(s) Oral at bedtime PRN  nicotine -   7 mG/24Hr(s) Patch 1 Patch Transdermal every 24 hours  traZODone 200 milliGRAM(s) Oral at bedtime      Allergies    bactritacine (Rash)  iodine containing compounds (Anaphylaxis)  IV Contrast (Anaphylaxis)  penicillin (Other)  shellfish (Anaphylaxis)    Intolerances        Vital Signs Last 24 Hrs  T(C): 36.9 (20 Jan 2023 16:05), Max: 37.8 (20 Jan 2023 05:55)  T(F): 98.4 (20 Jan 2023 16:05), Max: 100 (20 Jan 2023 05:55)  HR: 107 (20 Jan 2023 16:05) (100 - 119)  BP: 128/72 (20 Jan 2023 16:05) (124/83 - 157/88)  BP(mean): 111 (19 Jan 2023 22:15) (111 - 111)  RR: 18 (20 Jan 2023 16:05) (16 - 20)  SpO2: 100% (20 Jan 2023 16:05) (96% - 100%)    Parameters below as of 20 Jan 2023 16:05  Patient On (Oxygen Delivery Method): room air        01-19 @ 07:01  -  01-20 @ 07:00  --------------------------------------------------------  IN: 300 mL / OUT: 0 mL / NET: 300 mL          PHYSICAL EXAM:  Constitutional: Pleasant.  Head: NC/AT  EENT: MMM  Neck: -JVD  Respiratory: CTA B/L; no W/R/R  Cardiovascular: +S1/S2, RRR  Gastrointestinal: NT/ND  Extremities: WWP;  Vascular: 2+ radial pulses B/L  Neurological: awake and alert; LUIS    LABS:      CBC Full  -  ( 20 Jan 2023 07:56 )  WBC Count : 13.02 K/uL  RBC Count : 4.55 M/uL  Hemoglobin : 13.7 g/dL  Hematocrit : 40.9 %  Platelet Count - Automated : 275 K/uL  Mean Cell Volume : 89.9 fl  Mean Cell Hemoglobin : 30.1 pg  Mean Cell Hemoglobin Concentration : 33.5 gm/dL  Auto Neutrophil # : 8.97 K/uL  Auto Lymphocyte # : 2.79 K/uL  Auto Monocyte # : 1.14 K/uL  Auto Eosinophil # : 0.02 K/uL  Auto Basophil # : 0.07 K/uL  Auto Neutrophil % : 68.9 %  Auto Lymphocyte % : 21.4 %  Auto Monocyte % : 8.8 %  Auto Eosinophil % : 0.2 %  Auto Basophil % : 0.5 %    01-20    139  |  102  |  8   ----------------------------<  104<H>  3.8   |  23  |  0.72    Ca    9.6      20 Jan 2023 07:56  Phos  2.8     01-20  Mg     1.7     01-20    TPro  7.1  /  Alb  3.6  /  TBili  1.2  /  DBili  x   /  AST  15  /  ALT  11  /  AlkPhos  91  01-20    PT/INR - ( 19 Jan 2023 10:38 )   PT: 14.3 sec;   INR: 1.20          PTT - ( 19 Jan 2023 10:38 )  PTT:30.9 sec                  RADIOLOGY & ADDITIONAL STUDIES: PULMONARY SERVICE INITIAL CONSULT NOTE    HPI:  72F with history of Stage I breast CA, currently on anastrazole, diagnosed recently with erythrocytosis being managed with phlebotomy and s/p bone marrow biopsy 1/11/2023 without clear diagnosis who presents after episode of chest pain. Was out shopping picking up groceries when she began to experience acute onset dyspnea with acute onset pleuritic chest pain for which she presented to the ED. Was found to have Pulmonary embolus for which pulmonary has been consulted. Patient currently experiencing some dyspnea on exertion with ambulation.       REVIEW OF SYSTEMS:  Constitutional: No fever, weight loss or fatigue  Eyes: No eye pain, visual disturbances, or discharge  ENMT:  No difficulty hearing, tinnitus, vertigo; No sinus or throat pain  Neck: No pain, stiffness or neck swelling  Respiratory: see HPI  Cardiovascular: No chest pain, palpitations, dizziness or leg swelling  Gastrointestinal: No abdominal or epigastric pain. No nausea, vomiting or hematemesis; No diarrhea or constipation. No melena or hematochezia.  Genitourinary: No dysuria, frequency, hematuria or incontinence  Neurological: No headaches, memory loss, loss of strength, numbness or tremors  Skin: No itching, burning, rashes or lesions   Lymph Nodes: No enlarged glands  Endocrine: No heat or cold intolerance; No hair loss  Musculoskeletal: No joint pain or swelling; No muscle, back or extremity pain  Psychiatric: No depression, anxiety, mood swings or difficulty sleeping  Heme/Lymph: No easy bruising or bleeding gums  Allergy and Immunologic: No hives or eczema    PAST MEDICAL & SURGICAL HISTORY:  Depression      Breast CA      Erythrocytosis      Eardrum rupture, left      Hernia, umbilical  as a child      Foot pain  bilateral bone between pinky toe on the left and the right      Lesion of eye  left eye stye      H/O lumpectomy  right- cancer          FAMILY HISTORY:      SOCIAL HISTORY:  Smoking Status: [ ] Current, [ ] Former, [x] Never  Pack Years:    MEDICATIONS:  Pulmonary:    Antimicrobials:    Anticoagulants:  apixaban 10 milliGRAM(s) Oral every 12 hours    Onc:  exemestane 25 milliGRAM(s) Oral daily    GI/:  senna 2 Tablet(s) Oral at bedtime    Endocrine:    Cardiac:    Other Medications:  buPROPion XL (24-Hour) . 300 milliGRAM(s) Oral daily  gabapentin 200 milliGRAM(s) Oral at bedtime  LORazepam   Injectable 1 milliGRAM(s) IV Push every 2 hours PRN  melatonin 5 milliGRAM(s) Oral at bedtime PRN  nicotine -   7 mG/24Hr(s) Patch 1 Patch Transdermal every 24 hours  traZODone 200 milliGRAM(s) Oral at bedtime      Allergies    bactritacine (Rash)  iodine containing compounds (Anaphylaxis)  IV Contrast (Anaphylaxis)  penicillin (Other)  shellfish (Anaphylaxis)    Intolerances        Vital Signs Last 24 Hrs  T(C): 36.9 (20 Jan 2023 16:05), Max: 37.8 (20 Jan 2023 05:55)  T(F): 98.4 (20 Jan 2023 16:05), Max: 100 (20 Jan 2023 05:55)  HR: 107 (20 Jan 2023 16:05) (100 - 119)  BP: 128/72 (20 Jan 2023 16:05) (124/83 - 157/88)  BP(mean): 111 (19 Jan 2023 22:15) (111 - 111)  RR: 18 (20 Jan 2023 16:05) (16 - 20)  SpO2: 100% (20 Jan 2023 16:05) (96% - 100%)    Parameters below as of 20 Jan 2023 16:05  Patient On (Oxygen Delivery Method): room air        01-19 @ 07:01  -  01-20 @ 07:00  --------------------------------------------------------  IN: 300 mL / OUT: 0 mL / NET: 300 mL          PHYSICAL EXAM:  Constitutional: Pleasant.  Head: NC/AT  EENT: MMM  Neck: -JVD  Respiratory: CTA B/L; no W/R/R  Cardiovascular: +S1/S2, RRR  Gastrointestinal: NT/ND  Extremities: WWP;  Vascular: 2+ radial pulses B/L  Neurological: awake and alert; LUIS    LABS:      CBC Full  -  ( 20 Jan 2023 07:56 )  WBC Count : 13.02 K/uL  RBC Count : 4.55 M/uL  Hemoglobin : 13.7 g/dL  Hematocrit : 40.9 %  Platelet Count - Automated : 275 K/uL  Mean Cell Volume : 89.9 fl  Mean Cell Hemoglobin : 30.1 pg  Mean Cell Hemoglobin Concentration : 33.5 gm/dL  Auto Neutrophil # : 8.97 K/uL  Auto Lymphocyte # : 2.79 K/uL  Auto Monocyte # : 1.14 K/uL  Auto Eosinophil # : 0.02 K/uL  Auto Basophil # : 0.07 K/uL  Auto Neutrophil % : 68.9 %  Auto Lymphocyte % : 21.4 %  Auto Monocyte % : 8.8 %  Auto Eosinophil % : 0.2 %  Auto Basophil % : 0.5 %    01-20    139  |  102  |  8   ----------------------------<  104<H>  3.8   |  23  |  0.72    Ca    9.6      20 Jan 2023 07:56  Phos  2.8     01-20  Mg     1.7     01-20    TPro  7.1  /  Alb  3.6  /  TBili  1.2  /  DBili  x   /  AST  15  /  ALT  11  /  AlkPhos  91  01-20    PT/INR - ( 19 Jan 2023 10:38 )   PT: 14.3 sec;   INR: 1.20          PTT - ( 19 Jan 2023 10:38 )  PTT:30.9 sec                  RADIOLOGY & ADDITIONAL STUDIES:

## 2023-01-20 NOTE — PROGRESS NOTE ADULT - PROBLEM SELECTOR PLAN 1
Sedentary lifestyle with hx breast CA (s/p lumpectomy/XRT, on daily exemestane.) colon CA screening UTD (2016 wnl). denies FHx blood disorders. s/p BMBx on 1/12 with Dr. Kilgore for erythrocytosis, path pending.  VQ scan showing bilateral PE, HD stable.  no chest pain. trop neg x2. EKG with aVR elevation and diffuse ST depressions likely global ischemic from PE.   High PESI score (class IV, indicating 4-11.4% 30 day mortality).  - s/p lovenox 70mg q12h,   - transition to Eliquis 10 mg po bid x14 days (1/20- )  - pulmonology consulted, will follow recs

## 2023-01-20 NOTE — DISCHARGE NOTE PROVIDER - DISCHARGING ATTENDING PHYSICIAN:
Problem: Potential for Falls  Goal: Patient will remain free of falls  INTERVENTIONS:  - Assess patient frequently for physical needs  -  Identify cognitive and physical deficits and behaviors that affect risk of falls    -  [place] fall precautions as indicated by assessment   - Educate patient/family on patient safety including physical limitations  - Instruct patient to call for assistance with activity based on assessment  - Modify environment to reduce risk of injury  - Consider OT/PT consult to assist with strengthening/mobility   Outcome: Progressing Amira Smith

## 2023-01-20 NOTE — PROGRESS NOTE ADULT - PROBLEM SELECTOR PLAN 2
Current alcohol use (two 24 oz. malt liquors and 1 beer daily). Last drink 1/19/23, no history of withdrawals.  - moderate concern for withdrawal  - [facility] protocol    #Tobacco use disorder  - nicotine patch qd

## 2023-01-20 NOTE — DISCHARGE NOTE PROVIDER - HOSPITAL COURSE
#Discharge: do not delete    72F with PMH R breast cancer (2019, s/p lumpectomy XRT. on exemestane), depression, erythrocytosis (weekly phlebotomy 500cc), ?CVA who presents with 1 day of CARRILLO, found to have evidence of multiple bilateral pulmonary emboli, admitted for further management.    Hospital course (by problem):   #Pulmonary embolism.   Sedentary lifestyle with hx breast CA (s/p lumpectomy/XRT, on daily exemestane.) colon CA screening UTD (2016 wnl). denies FHx blood disorders. s/p BMBx on 1/12 with Dr. Kilgore for erythrocytosis, path pending.  VQ scan showing bilateral PE, HD stable. s/p lovenox 70mg q12h  no chest pain. trop neg x2. EKG with aVR elevation and diffuse ST depressions likely global ischemic from PE.   High PESI score (class IV, indicating 4-11.4% 30 day mortality).  - Eliquis 10 mg po bid x14 days (1/20-2/2) then Eliquis 5 mg po bid thereafter  - pulmonology consulted, will follow recs.    #DVT.  Acute deep venous thrombosis of a LEFT intramuscular calf vein found on venous doppler.  - Anticoagulation plan as above.    #Alcohol use.   Current alcohol use (two 24 oz. malt liquors and 1 beer daily). Last drink 1/19/23, no history of withdrawals.  - moderate concern for withdrawal  - [facility] protocol    #Tobacco use disorder  - nicotine patch qd.    #Breast cancer.   R breast CA s/p lumpectomy/XRT. On daily PO exemestane (aromatase inhibitor), follows with Dr. Kilgore.  - will follow heme/onc rec on resuming exemestane    #Erythrocytosis  follows with Dr. Kilgore, gets weekly phlebotomy. Hgb 15.3 on admission.  - outpatient follow up.    #Major depression.   Takes home wellbutrin, trazodone, gabapentin.  - continue home wellbutrin XR 300mg qd  - continue home trazodone 200mg qhs  - continue home gabapentin 200mg qhs.    #History of CVA (cerebrovascular accident).   Per chart review. Pt denies hx CVA. Per PCP chart review, prior imaging shows small vessel ischemic disease. Patient states she takes ASA 81 po qd, not on currently statin.  - resume aspirin on dc.    Patient was discharged to: home    New medications: Eliquis 10 mg po bid x14 days (1/20-2/2/2023) then Eliquis 5 mg po bid  Changes to old medications:  Medications that were stopped:    Items to follow up as outpatient:    Physical exam at the time of discharge:  General: in no acute distress, speaking in full sentences on room air  Eyes: EOMI intact bilaterally. Anicteric sclerae, moist conjunctivae  HENT: Moist mucous membranes  Neck: Trachea midline, supple  Lungs: CTA B/L. No wheezes, rales, or rhonchi  Cardiovascular: Tachycardic. Regular rhythm. No murmurs, rubs, or gallops  Abdomen: Soft, non-tender non-distended; No rebound or guarding. +BSx4  Extremities: WWP, No clubbing, cyanosis or edema. No calf tenderness bilaterally, equal size/symmetry.  MSK: No midline bony tenderness. No CVA tenderness bilaterally.  Neurological: Alert and oriented x3  Skin: Warm and dry. No obvious rash         72F with PMH R breast cancer (2019, s/p lumpectomy XRT. on exemestane), depression, erythrocytosis (weekly phlebotomy 500cc), ?CVA who presents with 1 day of CARRILLO, found to have evidence of multiple bilateral pulmonary emboli, admitted for further management.    Hospital course (by problem):   #Pulmonary embolism.   Sedentary lifestyle with hx breast CA (s/p lumpectomy/XRT, on daily exemestane.) colon CA screening UTD (2016 wnl). denies FHx blood disorders. s/p BMBx on 1/12 with Dr. Kilgore for erythrocytosis, path pending.  VQ scan showing bilateral PE, HD stable. s/p lovenox 70mg q12h  no chest pain. trop neg x2. EKG with aVR elevation and diffuse ST depressions likely global ischemic from PE.   High PESI score (class IV, indicating 4-11.4% 30 day mortality).  - Eliquis 10 mg po bid x14 days (1/20-2/2) then Eliquis 5 mg po bid thereafter      #DVT.  Acute deep venous thrombosis of a LEFT intramuscular calf vein found on venous doppler.  - Anticoagulation plan as above.    #Tobacco use disorder  - nicotine patch qd.    #Breast cancer.   R breast CA s/p lumpectomy/XRT. On daily PO exemestane (aromatase inhibitor), follows with Dr. Kilgore.  - will follow heme/onc     #Erythrocytosis  follows with Dr. Kilgore, gets weekly phlebotomy. Hgb 15.3 on admission.  - outpatient follow up.    #Major depression.   Takes home wellbutrin, trazodone, gabapentin.  - continue home wellbutrin XR 300mg qd  - continue home trazodone 200mg qhs  - continue home gabapentin 200mg qhs.    #History of CVA (cerebrovascular accident).   Per chart review. Pt denies hx CVA. Per PCP chart review, prior imaging shows small vessel ischemic disease. Patient states she takes ASA 81 po qd, not on currently statin.  - resume aspirin on dc.    Patient was discharged to: home    New medications: Eliquis 10 mg po bid x7days (1/20-1/27/2023) then Eliquis 5 mg po bid  Changes to old medications:  Medications that were stopped:    Items to follow up as outpatient:    Physical exam at the time of discharge:  General: in no acute distress, speaking in full sentences on room air  Eyes: EOMI intact bilaterally. Anicteric sclerae, moist conjunctivae  HENT: Moist mucous membranes  Neck: Trachea midline, supple  Lungs: CTA B/L. No wheezes, rales, or rhonchi  Cardiovascular: Tachycardic. Regular rhythm. No murmurs, rubs, or gallops  Abdomen: Soft, non-tender non-distended; No rebound or guarding. +BSx4  Extremities: WWP, No clubbing, cyanosis or edema. No calf tenderness bilaterally, equal size/symmetry.  MSK: No midline bony tenderness. No CVA tenderness bilaterally.  Neurological: Alert and oriented x3  Skin: Warm and dry. No obvious rash

## 2023-01-20 NOTE — CONSULT NOTE ADULT - ASSESSMENT
72F with history of Stage I breast CA, currently on anastrazole, diagnosed recently with erythrocytosis being managed with phlebotomy who presented with acute onset sob and pleuritic chest pain found to have multiple bilateral PE for which pulmonary has been consulted.       #Pulmonary emboli  Has multiple risk factors for PE (malignancy, anastrazole, erythrocytosis). No evidence of acute right heat dysfunction, PASP < 60. Troponin normal. High risk based on SPESI, no intervention per PERT.   - Agree with switch to eliquis  - will need close f/u with her hematologist Dr. Kilgore 72F with history of Stage I breast CA, currently on anastrazole, diagnosed recently with erythrocytosis being managed with phlebotomy who presented with acute onset sob and pleuritic chest pain found to have multiple bilateral PE for which pulmonary has been consulted.       #Pulmonary emboli  Has multiple risk factors for PE (malignancy, anastrazole, erythrocytosis). No evidence of acute right heart dysfunction, PASP < 60. Troponin normal. High risk based on SPESI, no intervention per PERT.   - Agree with switch to eliquis  - will need close f/u with her hematologist Dr. Kilgore

## 2023-01-20 NOTE — PROGRESS NOTE ADULT - PROBLEM SELECTOR PLAN 6
F: Tolerating PO, no IVF  E: Replenish K<4, Mg<2  N: DASH/TLC diet  VTE Ppx: Eliquis 10 mg po bid  GI: not needed    C: Full Code  D: RMF, pending PT eval    Problem: Nutrition, metabolism, and development symptoms

## 2023-01-20 NOTE — PROGRESS NOTE ADULT - ATTENDING COMMENTS
72F with PMH of breast cancer, depression and erythrocytosis (gets weekly phlebotomy) presenting with CP and CARRILLO, noted to have multiple PEs, and seen by PERT team.     Physical exam  General: complaining of some chest pain (similar to what brought her to the hospital)  HEENT: normocephalic, atraumatic, MMM  Cards: RRR, normal S1/S2, no mrg  Pulm: CTAB, no wheeze, crackles, rales or rhonchi  Ab: soft, nontender, nondistended, normoactive bowel sounds  Ext: wwp, no edema, erythema or asymmetry  Neuro: grossly nonfocal exam, follows commands, speech is fluent    - continue with eliquis for PE  - follow up TTE and lower extremity dopplers  - follow up with oncology about restarting oral chemotherapy  - anticipate discharge tomorrow    >55 minutes spent on this encounter, including face to face with patient, documentation and care coordination.

## 2023-01-20 NOTE — DISCHARGE NOTE PROVIDER - NSDCCPCAREPLAN_GEN_ALL_CORE_FT
PRINCIPAL DISCHARGE DIAGNOSIS  Diagnosis: Pulmonary embolism  Assessment and Plan of Treatment: You were found to have multiple clots in your lungs, or pulmonary emboli. The pulmonology team was consulted and recommended __________________  - take Eliquis 10 mg by mouth twice daily for 14 days from 1/20-2/2/2023.  - from 2/3/2023 onward, take Eliquis 5 mg by mouth twice daily.  - follow up with _________________  Pulmonary embolism (or "PE") is a blockage in one or more of the blood vessels that supply blood to the lungs. Most often these blockages are caused by blood clots that form elsewhere and then travel to the lungs. In rare cases, blockages can also be caused by air bubbles, tiny globs of fat, or pieces of tumor that travel to the lungs. If a blood clot forms or gets stuck inside a blood vessel, it can clog the vessel and keep blood from getting where it needs to go. When that happens in the lungs, the lungs can get damaged. Having blocked arteries in the lung can also make it hard to breathe and can even lead to death. Common symptoms include panting, shortness of breath, or trouble breathing, sharp, knife-like chest pain when you breathe in or strain, coughing or coughing up blood or simply a rapid heartbeat. If you get any of these symptoms, especially if they happen over a short period of time (hours or days) or get worse quickly, call for an ambulance. At the hospital, doctors can run tests to find out if you do have a clot. Blood clots in the lungs can lead to death. That's why it's important to act fast and find out if there is a clot.      SECONDARY DISCHARGE DIAGNOSES  Diagnosis: Deep vein thrombosis (DVT)  Assessment and Plan of Treatment: You were found to have blood clot of the left calf vein, also called deep vein thrombosis. This is the likely source of the clots that traveled to your lungs. The treatment of this is the same as the clots in the lungs.  - take Eliquis 10 mg by mouth twice daily for 14 days from 1/20-2/2/2023.  - from 2/3/2023 onward, take Eliquis 5 mg by mouth twice daily.  - follow up with _________________  You were found to have a DVT during this hospital admission. Deep vein thrombosis (DVT) is a serious condition that occurs when a blood clot forms in a vein located deep inside your body. A blood clot is a clump of blood that's turned to a solid state. Deep vein blood clots typically form in your thigh or lower leg, but they can also develop in other areas of your body.     PRINCIPAL DISCHARGE DIAGNOSIS  Diagnosis: Pulmonary embolism  Assessment and Plan of Treatment: You were found to have multiple clots in your lungs, or pulmonary emboli. The pulmonology team was consulted and recommended you start on a blood thinner called eliquis  - take Eliquis 10 mg by mouth twice daily for 7 days from 1/20-1/27/2023.  - from 2/3/2023 onward, take Eliquis 5 mg by mouth twice daily.  - follow up with  Dr Kilgore  Pulmonary embolism (or "PE") is a blockage in one or more of the blood vessels that supply blood to the lungs. Most often these blockages are caused by blood clots that form elsewhere and then travel to the lungs. In rare cases, blockages can also be caused by air bubbles, tiny globs of fat, or pieces of tumor that travel to the lungs. If a blood clot forms or gets stuck inside a blood vessel, it can clog the vessel and keep blood from getting where it needs to go. When that happens in the lungs, the lungs can get damaged. Having blocked arteries in the lung can also make it hard to breathe and can even lead to death. Common symptoms include panting, shortness of breath, or trouble breathing, sharp, knife-like chest pain when you breathe in or strain, coughing or coughing up blood or simply a rapid heartbeat. If you get any of these symptoms, especially if they happen over a short period of time (hours or days) or get worse quickly, call for an ambulance. At the hospital, doctors can run tests to find out if you do have a clot. Blood clots in the lungs can lead to death. That's why it's important to act fast and find out if there is a clot.      SECONDARY DISCHARGE DIAGNOSES  Diagnosis: Deep vein thrombosis (DVT)  Assessment and Plan of Treatment: You were found to have blood clot of the left calf vein, also called deep vein thrombosis. This is the likely source of the clots that traveled to your lungs. The treatment of this is the same as the clots in the lungs.  You were found to have a DVT during this hospital admission. Deep vein thrombosis (DVT) is a serious condition that occurs when a blood clot forms in a vein located deep inside your body. A blood clot is a clump of blood that's turned to a solid state. Deep vein blood clots typically form in your thigh or lower leg, but they can also develop in other areas of your body.

## 2023-01-20 NOTE — PROGRESS NOTE ADULT - PROBLEM SELECTOR PLAN 3
R breast CA s/p lumpectomy/XRT. On daily PO exemestane (aromatase inhibitor), follows with Dr. Kilgore.  - will follow heme/onc rec on resuming exemestane    #Erythrocytosis  follows with Dr. Kilgore, gets weekly phlebotomy. Hgb 15.3 on admission.  - outpatient follow up

## 2023-01-20 NOTE — PROGRESS NOTE ADULT - PROBLEM SELECTOR PLAN 4
Takes home wellbutrin, trazodone, gabapentin.  - continue home wellbutrin XR 300mg qd  - continue home trazodone 200mg qhs  - continue home gabapentin 200mg qhs

## 2023-01-20 NOTE — PHYSICAL THERAPY INITIAL EVALUATION ADULT - LEVEL OF INDEPENDENCE: STAIR NEGOTIATION, REHAB EVAL
TBA, currently deferred due to CARRILLO with short distance ambulation on level surfaces. aaaaaaaaaaaaaaaaaa

## 2023-01-20 NOTE — CONSULT NOTE ADULT - SUBJECTIVE AND OBJECTIVE BOX
HALI CARNES  MRN-[id number]    HPI:    Hali Carnes is a 72F with history of Stage I breast CA, currently on anastrazole, recently with erythrocytosis being managed with phlebotomy and s/p bone marrow biopsy 1/11/2023 without clear diagnosis admitted for CARRILLO, pleuritic chest pain found to have b.l PE    Patient seen this a.m. Has left sided pleuritic pain, denies dyspnea at rest, or palpitations, no LE edema endorsed.    PAST MEDICAL & SURGICAL HISTORY:  Depression    Breast CA    Erythrocytosis    Eardrum rupture, left    Hernia, umbilical  as a child    Foot pain  bilateral bone between pinky toe on the left and the right    Lesion of eye  left eye stye    H/O lumpectomy  right- cancer        MEDICATIONS  (STANDING):  apixaban 10 milliGRAM(s) Oral every 12 hours  buPROPion XL (24-Hour) . 300 milliGRAM(s) Oral daily  gabapentin 200 milliGRAM(s) Oral at bedtime  magnesium sulfate  IVPB 1 Gram(s) IV Intermittent once  nicotine -   7 mG/24Hr(s) Patch 1 Patch Transdermal every 24 hours  potassium chloride   Powder 20 milliEquivalent(s) Oral once  traZODone 200 milliGRAM(s) Oral at bedtime      Allergies    bactritacine (Rash)  iodine containing compounds (Anaphylaxis)  IV Contrast (Anaphylaxis)  penicillin (Other)  shellfish (Anaphylaxis)    Intolerances          FAMILY HISTORY:      ROS:    ROS is otherwise negative.    Physical exam:    Vital signs  Vital Signs Last 24 Hrs  T(C): 37.8 (01-20-23 @ 05:55), Max: 37.8 (01-20-23 @ 05:55)  T(F): 100 (01-20-23 @ 05:55), Max: 100 (01-20-23 @ 05:55)  HR: 115 (01-20-23 @ 05:55) (98 - 120)  BP: 124/83 (01-20-23 @ 05:55) (124/83 - 157/88)  BP(mean): 111 (01-19-23 @ 22:15) (111 - 111)  RR: 18 (01-20-23 @ 05:55) (16 - 22)  SpO2: 99% (01-20-23 @ 05:55) (95% - 103%)    HEENT: PERRLA, pink mucosae  No palpable lymphadenopathy  Cardiovascular: Regular rhythm/rate, no murmurs, rubs, gallops  Respiratory: clear to asucultation B/L  Abdomen: soft, non tender, non distended  Extremities:  no peripheral edema  Neuro: alert, awake and oriented x 3, no focal abnormalities  Skin: warm, no obvious lesions on visible skin    LABS:  CBC Full  -  ( 20 Jan 2023 07:56 )  WBC Count : 13.02 K/uL  Hemoglobin : 13.7 g/dL  Hematocrit : 40.9 %  Platelet Count - Automated : 275 K/uL  Mean Cell Volume : 89.9 fl  Mean Cell Hemoglobin : 30.1 pg  Mean Cell Hemoglobin Concentration : 33.5 gm/dL  Auto Neutrophil # : 8.97 K/uL  Auto Lymphocyte # : 2.79 K/uL  Auto Monocyte # : 1.14 K/uL  Auto Eosinophil # : 0.02 K/uL  Auto Basophil # : 0.07 K/uL  Auto Neutrophil % : 68.9 %  Auto Lymphocyte % : 21.4 %  Auto Monocyte % : 8.8 %  Auto Eosinophil % : 0.2 %  Auto Basophil % : 0.5 %    20 Jan 2023 07:56    139    |  102    |  8      ----------------------------<  104    3.8     |  23     |  0.72     Ca    9.6        20 Jan 2023 07:56  Phos  2.8       20 Jan 2023 07:56  Mg     1.7       20 Jan 2023 07:56    TPro  7.1    /  Alb  3.6    /  TBili  1.2    /  DBili  x      /  AST  15     /  ALT  11     /  AlkPhos  91     20 Jan 2023 07:56      PERTINENT IMAGING STUDIES: reviewed    ASSESSMENT:  72F with prior breast CA now with b/l PE    PLAN  Pulmonary emboli  And recently noted erythrocytosis  BMBx negative for clear primary cause of erythrocytosis, MPN panel negative  Potentially elevated RBC was a sequelae of reduced oxygenation  PE overall without clear provoking feature otherwise, increased risk of thrombosis on AI may be possible contributing factor  Agree with plan for Eliquis.    Patient to f/u with Dr. Kilgore to discuss benefit of further testing in the ambulatory setting        Thank you    Raj Longo MD for Aleyda Kilgore MD  [phone]

## 2023-01-21 ENCOUNTER — TRANSCRIPTION ENCOUNTER (OUTPATIENT)
Age: 73
End: 2023-01-21

## 2023-01-21 VITALS
TEMPERATURE: 99 F | RESPIRATION RATE: 20 BRPM | HEART RATE: 106 BPM | DIASTOLIC BLOOD PRESSURE: 80 MMHG | OXYGEN SATURATION: 100 % | SYSTOLIC BLOOD PRESSURE: 120 MMHG

## 2023-01-21 LAB
ANION GAP SERPL CALC-SCNC: 12 MMOL/L — SIGNIFICANT CHANGE UP (ref 5–17)
BUN SERPL-MCNC: 6 MG/DL — LOW (ref 7–23)
CALCIUM SERPL-MCNC: 9.6 MG/DL — SIGNIFICANT CHANGE UP (ref 8.4–10.5)
CHLORIDE SERPL-SCNC: 102 MMOL/L — SIGNIFICANT CHANGE UP (ref 96–108)
CO2 SERPL-SCNC: 23 MMOL/L — SIGNIFICANT CHANGE UP (ref 22–31)
CREAT SERPL-MCNC: 0.77 MG/DL — SIGNIFICANT CHANGE UP (ref 0.5–1.3)
EGFR: 83 ML/MIN/1.73M2 — SIGNIFICANT CHANGE UP
GLUCOSE SERPL-MCNC: 99 MG/DL — SIGNIFICANT CHANGE UP (ref 70–99)
HCT VFR BLD CALC: 40.7 % — SIGNIFICANT CHANGE UP (ref 34.5–45)
HGB BLD-MCNC: 13.5 G/DL — SIGNIFICANT CHANGE UP (ref 11.5–15.5)
MAGNESIUM SERPL-MCNC: 2 MG/DL — SIGNIFICANT CHANGE UP (ref 1.6–2.6)
MCHC RBC-ENTMCNC: 29.5 PG — SIGNIFICANT CHANGE UP (ref 27–34)
MCHC RBC-ENTMCNC: 33.2 GM/DL — SIGNIFICANT CHANGE UP (ref 32–36)
MCV RBC AUTO: 88.9 FL — SIGNIFICANT CHANGE UP (ref 80–100)
NRBC # BLD: 0 /100 WBCS — SIGNIFICANT CHANGE UP (ref 0–0)
PHOSPHATE SERPL-MCNC: 2.7 MG/DL — SIGNIFICANT CHANGE UP (ref 2.5–4.5)
PLATELET # BLD AUTO: 281 K/UL — SIGNIFICANT CHANGE UP (ref 150–400)
POTASSIUM SERPL-MCNC: 3.7 MMOL/L — SIGNIFICANT CHANGE UP (ref 3.5–5.3)
POTASSIUM SERPL-SCNC: 3.7 MMOL/L — SIGNIFICANT CHANGE UP (ref 3.5–5.3)
RBC # BLD: 4.58 M/UL — SIGNIFICANT CHANGE UP (ref 3.8–5.2)
RBC # FLD: 14 % — SIGNIFICANT CHANGE UP (ref 10.3–14.5)
SODIUM SERPL-SCNC: 137 MMOL/L — SIGNIFICANT CHANGE UP (ref 135–145)
WBC # BLD: 13.05 K/UL — HIGH (ref 3.8–10.5)
WBC # FLD AUTO: 13.05 K/UL — HIGH (ref 3.8–10.5)

## 2023-01-21 PROCEDURE — 86803 HEPATITIS C AB TEST: CPT

## 2023-01-21 PROCEDURE — 80048 BASIC METABOLIC PNL TOTAL CA: CPT

## 2023-01-21 PROCEDURE — 87637 SARSCOV2&INF A&B&RSV AMP PRB: CPT

## 2023-01-21 PROCEDURE — 80053 COMPREHEN METABOLIC PANEL: CPT

## 2023-01-21 PROCEDURE — 82550 ASSAY OF CK (CPK): CPT

## 2023-01-21 PROCEDURE — 85730 THROMBOPLASTIN TIME PARTIAL: CPT

## 2023-01-21 PROCEDURE — C8929: CPT

## 2023-01-21 PROCEDURE — 97161 PT EVAL LOW COMPLEX 20 MIN: CPT

## 2023-01-21 PROCEDURE — 99285 EMERGENCY DEPT VISIT HI MDM: CPT | Mod: 25

## 2023-01-21 PROCEDURE — A9567: CPT

## 2023-01-21 PROCEDURE — 83735 ASSAY OF MAGNESIUM: CPT

## 2023-01-21 PROCEDURE — 36415 COLL VENOUS BLD VENIPUNCTURE: CPT

## 2023-01-21 PROCEDURE — 84484 ASSAY OF TROPONIN QUANT: CPT

## 2023-01-21 PROCEDURE — 93005 ELECTROCARDIOGRAM TRACING: CPT

## 2023-01-21 PROCEDURE — 85027 COMPLETE CBC AUTOMATED: CPT

## 2023-01-21 PROCEDURE — 83880 ASSAY OF NATRIURETIC PEPTIDE: CPT

## 2023-01-21 PROCEDURE — 82553 CREATINE MB FRACTION: CPT

## 2023-01-21 PROCEDURE — 71045 X-RAY EXAM CHEST 1 VIEW: CPT

## 2023-01-21 PROCEDURE — 84100 ASSAY OF PHOSPHORUS: CPT

## 2023-01-21 PROCEDURE — 85025 COMPLETE CBC W/AUTO DIFF WBC: CPT

## 2023-01-21 PROCEDURE — 93970 EXTREMITY STUDY: CPT

## 2023-01-21 PROCEDURE — 78582 LUNG VENTILAT&PERFUS IMAGING: CPT | Mod: MA

## 2023-01-21 PROCEDURE — 99239 HOSP IP/OBS DSCHRG MGMT >30: CPT

## 2023-01-21 PROCEDURE — A9540: CPT

## 2023-01-21 PROCEDURE — 85610 PROTHROMBIN TIME: CPT

## 2023-01-21 RX ORDER — NICOTINE POLACRILEX 2 MG
1 GUM BUCCAL
Qty: 30 | Refills: 0
Start: 2023-01-21 | End: 2023-02-19

## 2023-01-21 RX ORDER — APIXABAN 2.5 MG/1
2 TABLET, FILM COATED ORAL
Qty: 28 | Refills: 0
Start: 2023-01-21 | End: 2023-01-27

## 2023-01-21 RX ORDER — EXEMESTANE 25 MG/1
1 TABLET, SUGAR COATED ORAL
Qty: 0 | Refills: 0 | DISCHARGE
Start: 2023-01-21

## 2023-01-21 RX ORDER — APIXABAN 2.5 MG/1
2 TABLET, FILM COATED ORAL
Qty: 120 | Refills: 0
Start: 2023-01-21 | End: 2023-02-19

## 2023-01-21 RX ADMIN — Medication 1 PATCH: at 06:16

## 2023-01-21 RX ADMIN — APIXABAN 10 MILLIGRAM(S): 2.5 TABLET, FILM COATED ORAL at 06:16

## 2023-01-21 RX ADMIN — BUPROPION HYDROCHLORIDE 300 MILLIGRAM(S): 150 TABLET, EXTENDED RELEASE ORAL at 12:16

## 2023-01-21 RX ADMIN — EXEMESTANE 25 MILLIGRAM(S): 25 TABLET, SUGAR COATED ORAL at 12:23

## 2023-01-21 RX ADMIN — POLYETHYLENE GLYCOL 3350 17 GRAM(S): 17 POWDER, FOR SOLUTION ORAL at 09:53

## 2023-01-21 NOTE — DISCHARGE NOTE NURSING/CASE MANAGEMENT/SOCIAL WORK - PATIENT PORTAL LINK FT
You can access the FollowMyHealth Patient Portal offered by [facility] by registering at the following website: [email or web address]. By joining [employer]’s FollowMyHealth portal, you will also be able to view your health information using other applications (apps) compatible with our system.

## 2023-01-21 NOTE — DISCHARGE NOTE NURSING/CASE MANAGEMENT/SOCIAL WORK - NSDCPEFALRISK_GEN_ALL_CORE
For information on Fall & Injury Prevention, visit: [email or web address] OR  [email or web address] OR  https://www.cdc.gov/steadi/patient.html

## 2023-01-21 NOTE — DISCHARGE NOTE NURSING/CASE MANAGEMENT/SOCIAL WORK - NSDCPEEMAIL_GEN_ALL_CORE
[facility] for Tobacco Control email [email or web address]

## 2023-01-25 DIAGNOSIS — Z91.013 ALLERGY TO SEAFOOD: ICD-10-CM

## 2023-01-25 DIAGNOSIS — I82.4Z2 ACUTE EMBOLISM AND THROMBOSIS OF UNSPECIFIED DEEP VEINS OF LEFT DISTAL LOWER EXTREMITY: ICD-10-CM

## 2023-01-25 DIAGNOSIS — Z72.0 TOBACCO USE: ICD-10-CM

## 2023-01-25 DIAGNOSIS — Z86.73 PERSONAL HISTORY OF TRANSIENT ISCHEMIC ATTACK (TIA), AND CEREBRAL INFARCTION WITHOUT RESIDUAL DEFICITS: ICD-10-CM

## 2023-01-25 DIAGNOSIS — Z88.0 ALLERGY STATUS TO PENICILLIN: ICD-10-CM

## 2023-01-25 DIAGNOSIS — I26.99 OTHER PULMONARY EMBOLISM WITHOUT ACUTE COR PULMONALE: ICD-10-CM

## 2023-01-25 DIAGNOSIS — D75.1 SECONDARY POLYCYTHEMIA: ICD-10-CM

## 2023-01-25 DIAGNOSIS — Z79.82 LONG TERM (CURRENT) USE OF ASPIRIN: ICD-10-CM

## 2023-01-25 DIAGNOSIS — F32.A DEPRESSION, UNSPECIFIED: ICD-10-CM

## 2023-01-25 DIAGNOSIS — F10.90 ALCOHOL USE, UNSPECIFIED, UNCOMPLICATED: ICD-10-CM

## 2023-01-25 DIAGNOSIS — C50.911 MALIGNANT NEOPLASM OF UNSPECIFIED SITE OF RIGHT FEMALE BREAST: ICD-10-CM

## 2023-01-25 DIAGNOSIS — I27.20 PULMONARY HYPERTENSION, UNSPECIFIED: ICD-10-CM

## 2023-01-25 DIAGNOSIS — Z91.041 RADIOGRAPHIC DYE ALLERGY STATUS: ICD-10-CM

## 2023-01-25 DIAGNOSIS — R00.0 TACHYCARDIA, UNSPECIFIED: ICD-10-CM

## 2023-01-25 DIAGNOSIS — Z92.3 PERSONAL HISTORY OF IRRADIATION: ICD-10-CM

## 2023-01-30 DIAGNOSIS — I82.402 ACUTE EMBOLISM AND THROMBOSIS OF UNSPECIFIED DEEP VEINS OF LEFT LOWER EXTREMITY: ICD-10-CM

## 2023-01-30 DIAGNOSIS — I26.01 SEPTIC PULMONARY EMBOLISM WITH ACUTE COR PULMONALE: ICD-10-CM

## 2023-01-30 RX ORDER — APIXABAN 5 MG/1
5 TABLET, FILM COATED ORAL TWICE DAILY
Refills: 0 | Status: ACTIVE | COMMUNITY
Start: 2023-01-30

## 2023-01-30 NOTE — DISCUSSION/SUMMARY
[FreeTextEntry1] : Patient was hospitalized at [facility] with pulmonary embolism and cor pulmonale.  She is currently on eliquis and seeing hematologist this Wednesday 2/1/23.  She cancelled her apptmt for this Friday with me but will call to reschedule when her medical care is all in order.  \par Writer gave pt writer's work cell to directly schedule with writer. \par \par Pt also needing PA for ramelteon renewed and writer will contact [facility] for this.

## 2023-02-01 PROBLEM — D75.1 SECONDARY POLYCYTHEMIA: Chronic | Status: ACTIVE | Noted: 2023-01-20

## 2023-02-02 ENCOUNTER — APPOINTMENT (OUTPATIENT)
Dept: CT IMAGING | Facility: HOSPITAL | Age: 73
End: 2023-02-02

## 2023-02-03 ENCOUNTER — APPOINTMENT (OUTPATIENT)
Dept: PSYCHIATRY | Facility: CLINIC | Age: 73
End: 2023-02-03

## 2023-02-09 ENCOUNTER — APPOINTMENT (OUTPATIENT)
Dept: PSYCHIATRY | Facility: CLINIC | Age: 73
End: 2023-02-09
Payer: MEDICARE

## 2023-02-09 PROCEDURE — 99443: CPT | Mod: 95

## 2023-02-09 NOTE — REASON FOR VISIT
[Patient] : Patient [Starting, patient seen in-person within last 6 months] : Telehealth services are being started as patient has seen in-person within last 6 months. [Telephone (audio) - Individual/Group] : This telephonic visit was provided via audio only technology. [Other Location: e.g. Home (Enter Location, City,State)___] : The provider was located at [unfilled]. [Home] : The patient, [unfilled], was located at home, [unfilled], at the time of the visit. [Verbal consent obtained from patient/other participant(s)] : Verbal consent for telehealth/telephonic services obtained from patient/other participant(s) [FreeTextEntry1] : treatment of anxiety (REGINE?) , dysthymia and/or MDD mild and possible PD

## 2023-02-09 NOTE — HISTORY OF PRESENT ILLNESS
[Not Applicable] : Not applicable [FreeTextEntry1] : Patient with chronic dysphoria and with some anxiety related to medical fears and going outside.   [FreeTextEntry2] : Patient reports first coming into psychiatric treatment to [facility] in the late 1980s, probably 1988 per pt.  (Records from [facility] show first appointment in 1996.) \par \par At the time, she stated she was crying a lot, unable to get up, laying in the dark and feeling very unsure of herself.  SHe additionally was afraid of being around crowds.  Patient stated that she was diagnosed with depression and started taking medications for this in the late 80s or early 90s and has been in treatment ever since, mostly psychopharm tx wit some attempts at therapy.   (Appears she started tx in 1996 however)\par \par Patient denied history of psych hospitalizations, suicidality, psychosis or javi though records indicate in 1996 she admitted to thinking about jumping out the window of her 13th story apartment that she shared with her mother.

## 2023-02-09 NOTE — PHYSICAL EXAM
[Cooperative] : cooperative [Clear] : clear [Linear/Goal Directed] : linear/goal directed [WNL] : within normal limits [3] : 3 [4] : 4 [5] : 5 [6] : 6 [FreeTextEntry1] : GARRY [de-identified] : NA [FreeTextEntry8] : mildly depressed [de-identified] : GARRY [de-identified] : adequate [de-identified] : fair

## 2023-02-09 NOTE — PLAN
[No] : No [Medication education provided] : Medication education provided. [FreeTextEntry4] : Patient gets out of the house at least a few days a week though still at times have periods of staying inside for many consecutive days with curtains closed; patient smoking 8-9 cigarettes a day and reviewed plan to further reduce [FreeTextEntry5] : Offered support/encouragement, psychoeducation and counseling regarding dx, meds, symptoms, etc.\par \par Reviewed/discussed plan below:\par \par Continue Gabapentin 300 mg qhs and Trazodone 200 mg qhs for anxiety and insomnia respectively\par Continue Ramelteon for insomnia and may take Ambien at 2.5-5 mg prn (not currently taking?) \par Continue Wellbutrin at  mg for both smoking cessation and to augment for depression and energy\par Pt to send labs to writer when able\par Gave pt Chela Keenan's information as a potential PCP to f/u with\par Encouraged pt to f/u asap with her clinicians given her significant concerning medical issues\par Discussed potential for therapy in future\par Contact writer prn\par \par 5/5/22: SOS-10: 52 minimally impaired\par 1/6/23: SOS-10: 42, minimally impaired\par \par 40 minutes spent reviewing prior records/notes, seeing patient and recording session note\par

## 2023-02-15 ENCOUNTER — APPOINTMENT (OUTPATIENT)
Dept: PSYCHIATRY | Facility: CLINIC | Age: 73
End: 2023-02-15
Payer: MEDICARE

## 2023-02-15 PROCEDURE — 99443: CPT | Mod: 95

## 2023-02-15 RX ORDER — LORAZEPAM 0.5 MG/1
0.5 TABLET ORAL
Qty: 10 | Refills: 0 | Status: DISCONTINUED | COMMUNITY
Start: 2022-10-25 | End: 2023-02-15

## 2023-02-15 RX ORDER — ZOLPIDEM TARTRATE 5 MG/1
5 TABLET ORAL
Qty: 12 | Refills: 4 | Status: DISCONTINUED | COMMUNITY
Start: 2021-07-21 | End: 2023-02-15

## 2023-02-15 NOTE — PHYSICAL EXAM
[Cooperative] : cooperative [Clear] : clear [Linear/Goal Directed] : linear/goal directed [WNL] : within normal limits [3] : 3 [4] : 4 [5] : 5 [6] : 6 [FreeTextEntry1] : GARRY [de-identified] : NA [FreeTextEntry8] : mildly depressed [de-identified] : GARRY [de-identified] : adequate [de-identified] : fair

## 2023-02-15 NOTE — PLAN
[No] : No [Medication education provided] : Medication education provided. [FreeTextEntry4] : Patient gets out of the house at least a few days a week though still at times have periods of staying inside for many consecutive days with curtains closed; patient smoking 8-9 cigarettes a day and reviewed plan to further reduce [FreeTextEntry5] : Offered support/encouragement, psychoeducation and counseling regarding dx, meds, symptoms, etc.\par \par Reviewed/discussed plan below:\par \par Continue Gabapentin 300 mg qhs and Trazodone 200 mg qhs for anxiety and insomnia respectively\par Continue Ramelteon for insomnia; (may stay off Ambien) \par Continue Wellbutrin at  mg for both smoking cessation and to augment for depression and energy\par Pt to send labs to writer when able\par To secure and see PCP asap\par May consider therapy in future\par Contact writer prn\par \par 5/5/22: SOS-10: 52 minimally impaired\par 1/6/23: SOS-10: 42, minimally impaired\par \par 30 minutes spent reviewing prior records/notes, seeing patient and recording session note\par

## 2023-02-15 NOTE — DISCUSSION/SUMMARY
[FreeTextEntry1] : Patient is a 72 year old, single black female and mother of adult daughter whom she has close but conflicted relations with.  Hali has struggled with depression, REGINE, insomnia and some agoraphobic tendencies.  She was recently hospitalized with pulmonary embolism in setting of hypercoaguable work up.

## 2023-02-15 NOTE — PHYSICAL EXAM
[Cooperative] : cooperative [Clear] : clear [Linear/Goal Directed] : linear/goal directed [WNL] : within normal limits [3] : 3 [4] : 4 [5] : 5 [6] : 6 [FreeTextEntry1] : GARRY [de-identified] : NA [FreeTextEntry8] : mildly depressed [de-identified] : GARRY [de-identified] : adequate [de-identified] : fair

## 2023-02-15 NOTE — PHYSICAL EXAM
[Cooperative] : cooperative [Clear] : clear [Linear/Goal Directed] : linear/goal directed [WNL] : within normal limits [3] : 3 [4] : 4 [5] : 5 [6] : 6 [FreeTextEntry1] : GARRY [de-identified] : NA [FreeTextEntry8] : mildly depressed [de-identified] : GARRY [de-identified] : adequate [de-identified] : fair

## 2023-02-15 NOTE — DISCUSSION/SUMMARY
[FreeTextEntry1] : Patient is a 72 year old, single black female and mother of adult daughter whom she has close but conflicted relations with.  Hali has struggled with depression, REGIEN, insomnia and some agoraphobic tendencies.  She was recently hospitalized with pulmonary embolism in setting of hypercoaguable work up.

## 2023-02-15 NOTE — HISTORY OF PRESENT ILLNESS
[Not Applicable] : Not applicable [FreeTextEntry1] : Patient with chronic dysphoria and with some anxiety related to medical fears and going outside.   [FreeTextEntry2] : Patient reports first coming into psychiatric treatment to [facility] in the late 1980s, probably 1988 per pt.  (Records from [facility] show first appointment in 1996.) \par \par At the time, she stated she was crying a lot, unable to get up, laying in the dark and feeling very unsure of herself.  SHe additionally was afraid of being around crowds.  Patient stated that she was diagnosed with depression and started taking medications for this in the late 80s or early 90s and has been in treatment ever since, mostly psychopharm tx wit some attempts at therapy.   (Appears she started tx in 1996 however)\par \par Patient denied history of psych hospitalizations, suicidality, psychosis or javi though records indicate in 1996 she admitted to thinking about jumping out the window of her [address]th story apartment that she shared with her mother.

## 2023-02-15 NOTE — HISTORY OF PRESENT ILLNESS
[Not Applicable] : Not applicable [FreeTextEntry1] : Patient with chronic dysphoria and with some anxiety related to medical fears and going outside.   [FreeTextEntry2] : Patient reports first coming into psychiatric treatment to [facility] in the late 1980s, probably 1988 per pt.  (Records from [facility] show first appointment in 1996.) \par \par At the time, she stated she was crying a lot, unable to get up, laying in the dark and feeling very unsure of herself.  SHe additionally was afraid of being around crowds.  Patient stated that she was diagnosed with depression and started taking medications for this in the late 80s or early 90s and has been in treatment ever since, mostly psychopharm tx wit some attempts at therapy.   (Appears she started tx in 1996 however)\par \par Patient denied history of psych hospitalizations, suicidality, psychosis or jaiv though records indicate in 1996 she admitted to thinking about jumping out the window of her [address]th story apartment that she shared with her mother.

## 2023-02-15 NOTE — REASON FOR VISIT
[Starting, patient seen in-person within last 6 months] : Telehealth services are being started as patient has seen in-person within last 6 months. [Telephone (audio) - Individual/Group] : This telephonic visit was provided via audio only technology. [Other Location: e.g. Home (Enter Location, City,State)___] : The provider was located at [unfilled]. [Home] : The patient, [unfilled], was located at home, [unfilled], at the time of the visit. [Verbal consent obtained from patient/other participant(s)] : Verbal consent for telehealth/telephonic services obtained from patient/other participant(s) [Patient] : Patient [FreeTextEntry1] : treatment of anxiety (REGINE?) , dysthymia and/or MDD mild and possible PD

## 2023-02-15 NOTE — HISTORY OF PRESENT ILLNESS
[Not Applicable] : Not applicable [FreeTextEntry1] : Patient with chronic dysphoria and with some anxiety related to medical fears and going outside.   [FreeTextEntry2] : Patient reports first coming into psychiatric treatment to [facility] in the late 1980s, probably 1988 per pt.  (Records from [facility] show first appointment in 1996.) \par \par At the time, she stated she was crying a lot, unable to get up, laying in the dark and feeling very unsure of herself.  SHe additionally was afraid of being around crowds.  Patient stated that she was diagnosed with depression and started taking medications for this in the late 80s or early 90s and has been in treatment ever since, mostly psychopharm tx wit some attempts at therapy.   (Appears she started tx in 1996 however)\par \par Patient denied history of psych hospitalizations, suicidality, psychosis or javi though records indicate in 1996 she admitted to thinking about jumping out the window of her [address]th story apartment that she shared with her mother.

## 2023-02-22 ENCOUNTER — APPOINTMENT (OUTPATIENT)
Dept: CT IMAGING | Facility: HOSPITAL | Age: 73
End: 2023-02-22

## 2023-02-22 ENCOUNTER — OUTPATIENT (OUTPATIENT)
Dept: OUTPATIENT SERVICES | Facility: HOSPITAL | Age: 73
LOS: 1 days | End: 2023-02-22
Payer: MEDICARE

## 2023-02-22 DIAGNOSIS — M79.673 PAIN IN UNSPECIFIED FOOT: Chronic | ICD-10-CM

## 2023-02-22 DIAGNOSIS — H57.9 UNSPECIFIED DISORDER OF EYE AND ADNEXA: Chronic | ICD-10-CM

## 2023-02-22 DIAGNOSIS — Z98.890 OTHER SPECIFIED POSTPROCEDURAL STATES: Chronic | ICD-10-CM

## 2023-02-22 DIAGNOSIS — H72.92 UNSPECIFIED PERFORATION OF TYMPANIC MEMBRANE, LEFT EAR: Chronic | ICD-10-CM

## 2023-02-22 DIAGNOSIS — K42.9 UMBILICAL HERNIA WITHOUT OBSTRUCTION OR GANGRENE: Chronic | ICD-10-CM

## 2023-02-22 PROCEDURE — 71250 CT THORAX DX C-: CPT | Mod: MH

## 2023-02-22 PROCEDURE — 74176 CT ABD & PELVIS W/O CONTRAST: CPT | Mod: MH

## 2023-02-22 PROCEDURE — 71250 CT THORAX DX C-: CPT | Mod: 26,MH

## 2023-02-22 PROCEDURE — 74176 CT ABD & PELVIS W/O CONTRAST: CPT | Mod: 26,MH

## 2023-03-03 ENCOUNTER — APPOINTMENT (OUTPATIENT)
Dept: PSYCHIATRY | Facility: CLINIC | Age: 73
End: 2023-03-03
Payer: MEDICARE

## 2023-03-03 PROCEDURE — 99214 OFFICE O/P EST MOD 30 MIN: CPT

## 2023-03-03 NOTE — HISTORY OF PRESENT ILLNESS
[Not Applicable] : Not applicable [FreeTextEntry1] : Patient with chronic dysphoria and with some anxiety related to medical fears and going outside.   [FreeTextEntry2] : Patient reports first coming into psychiatric treatment to [facility] in the late 1980s, probably 1988 per pt.  (Records from [facility] show first appointment in 1996.) \par \par At the time, she stated she was crying a lot, unable to get up, laying in the dark and feeling very unsure of herself.  SHe additionally was afraid of being around crowds.  Patient stated that she was diagnosed with depression and started taking medications for this in the late 80s or early 90s and has been in treatment ever since, mostly psychopharm tx wit some attempts at therapy.   (Appears she started tx in 1996 however)\par \par Patient denied history of psych hospitalizations, suicidality, psychosis or javi though records indicate in 1996 she admitted to thinking about jumping out the window of her [address]th story apartment that she shared with her mother.

## 2023-03-03 NOTE — PHYSICAL EXAM
[Average] : average [Cooperative] : cooperative [Constricted] : constricted [Clear] : clear [Linear/Goal Directed] : linear/goal directed [WNL] : within normal limits [No] : No [3] : 3 [4] : 4 [5] : 5 [6] : 6 [FreeTextEntry8] : mildly depressed [de-identified] : adequate [de-identified] : fair

## 2023-03-03 NOTE — PLAN
[No] : No [Medication education provided] : Medication education provided. [FreeTextEntry4] : Patient gets out of the house at least a few days a week though still at times have periods of staying inside for many consecutive days with curtains closed; patient smoking 8-9 cigarettes a day and reviewed plan to further reduce [FreeTextEntry5] : Offered support/encouragement, psychoeducation and counseling regarding dx, meds, symptoms, etc.\par \par Reviewed/discussed plan below:\par \par Continue Gabapentin and may increase to 600 mg qhs \par May continue Trazodone 200 mg qhs for anxiety and insomnia respectively\par Continue Ramelteon for insomnia; (may stay off Ambien) \par Continue Wellbutrin at  mg for both smoking cessation and to augment for depression and energy\par To secure and see PCP asap; mary lou Keenan MD referral to pt \par May order labs through [facility] in future if pt doesn't get apptmt and labs soon\par May consider therapy in future\par Contact writer prn\par \par 5/5/22: SOS-10: 52 minimally impaired\par 1/6/23: SOS-10: 42, minimally impaired\par \par 30 minutes spent reviewing prior records/notes, seeing patient and recording session note\par

## 2023-03-03 NOTE — DISCUSSION/SUMMARY
[FreeTextEntry1] : Patient is a 72 year old, single black female and mother of adult daughter whom she has close but conflicted relations with.  Hali has struggled with depression, REGINE, insomnia and some agoraphobic tendencies.  She was recently hospitalized with pulmonary embolism in setting of hypercoaguable work up. \par \par Date of last physical exam: pt not sure; gave referral for Chela Keenan for second time today\par Date of last (annual) labs: may order if pt doesn't make apptmt asap  (see labs section for details) \par Annual review of systems completed (Y/N): Yes\par Tobacco Screening Completed (Y/N): yes, quit smoking 1/19/2023\par \par   Return Demostration

## 2023-03-03 NOTE — REVIEW OF SYSTEMS
[Negative] : Allergic/Immunologic [FreeTextEntry6] : recent pulmonary embolism and hospitalization [de-identified] : dysphoria and anxiety [de-identified] : hypercoagulable for unclear reasons on blood thinners

## 2023-03-15 ENCOUNTER — APPOINTMENT (OUTPATIENT)
Dept: PSYCHIATRY | Facility: CLINIC | Age: 73
End: 2023-03-15

## 2023-03-16 ENCOUNTER — APPOINTMENT (OUTPATIENT)
Dept: PSYCHIATRY | Facility: CLINIC | Age: 73
End: 2023-03-16

## 2023-03-20 ENCOUNTER — APPOINTMENT (OUTPATIENT)
Dept: ULTRASOUND IMAGING | Facility: HOSPITAL | Age: 73
End: 2023-03-20

## 2023-03-20 ENCOUNTER — APPOINTMENT (OUTPATIENT)
Dept: MAMMOGRAPHY | Facility: HOSPITAL | Age: 73
End: 2023-03-20

## 2023-03-22 ENCOUNTER — APPOINTMENT (OUTPATIENT)
Dept: PULMONOLOGY | Facility: CLINIC | Age: 73
End: 2023-03-22
Payer: MEDICARE

## 2023-03-22 ENCOUNTER — NON-APPOINTMENT (OUTPATIENT)
Age: 73
End: 2023-03-22

## 2023-03-22 VITALS
BODY MASS INDEX: 23.85 KG/M2 | DIASTOLIC BLOOD PRESSURE: 82 MMHG | HEART RATE: 99 BPM | WEIGHT: 161 LBS | HEIGHT: 69 IN | TEMPERATURE: 97.9 F | SYSTOLIC BLOOD PRESSURE: 123 MMHG | OXYGEN SATURATION: 100 %

## 2023-03-22 DIAGNOSIS — R91.8 OTHER NONSPECIFIC ABNORMAL FINDING OF LUNG FIELD: ICD-10-CM

## 2023-03-22 DIAGNOSIS — R91.1 SOLITARY PULMONARY NODULE: ICD-10-CM

## 2023-03-22 PROCEDURE — 99204 OFFICE O/P NEW MOD 45 MIN: CPT | Mod: 25

## 2023-03-22 PROCEDURE — 36415 COLL VENOUS BLD VENIPUNCTURE: CPT

## 2023-03-23 LAB
ANION GAP SERPL CALC-SCNC: 19 MMOL/L
APTT BLD: 31.6 SEC
BASOPHILS # BLD AUTO: 0.05 K/UL
BASOPHILS NFR BLD AUTO: 0.6 %
BUN SERPL-MCNC: 12 MG/DL
CALCIUM SERPL-MCNC: 10.4 MG/DL
CHLORIDE SERPL-SCNC: 104 MMOL/L
CO2 SERPL-SCNC: 23 MMOL/L
CREAT SERPL-MCNC: 0.85 MG/DL
EGFR: 74 ML/MIN/1.73M2
EOSINOPHIL # BLD AUTO: 0.08 K/UL
EOSINOPHIL NFR BLD AUTO: 0.9 %
GLUCOSE SERPL-MCNC: 52 MG/DL
HCT VFR BLD CALC: 45.5 %
HGB BLD-MCNC: 14.1 G/DL
IMM GRANULOCYTES NFR BLD AUTO: 0.2 %
INR PPP: 1.33 RATIO
LYMPHOCYTES # BLD AUTO: 3.35 K/UL
LYMPHOCYTES NFR BLD AUTO: 37 %
MAN DIFF?: NORMAL
MCHC RBC-ENTMCNC: 27.3 PG
MCHC RBC-ENTMCNC: 31 GM/DL
MCV RBC AUTO: 88 FL
MONOCYTES # BLD AUTO: 0.65 K/UL
MONOCYTES NFR BLD AUTO: 7.2 %
NEUTROPHILS # BLD AUTO: 4.9 K/UL
NEUTROPHILS NFR BLD AUTO: 54.1 %
PLATELET # BLD AUTO: 257 K/UL
POTASSIUM SERPL-SCNC: 4.2 MMOL/L
PT BLD: 15.7 SEC
RBC # BLD: 5.17 M/UL
RBC # FLD: 16.3 %
SODIUM SERPL-SCNC: 145 MMOL/L
WBC # FLD AUTO: 9.05 K/UL

## 2023-03-23 NOTE — HISTORY OF PRESENT ILLNESS
[Former] : former [TextBox_4] : Patient presents with daughter at bedside.\par \par 73 yo female with history of invasive carcinoma of the R breast s/p chemo and radiation therapy per patient. In January was feeling short of breath and was hospitalized in [facility] and diagnosed with PE (multiple bilateral) and DVT (left calf vein) on VQ scan (allergic to contrast) and then placed on Eliquis. History of breast cancer so suspect secondary to malignancy, anastrazole, or smoking history. Is currently on exemestane and states that she is in remission. Had a repeat CT chest of the lungs that showed a 1.5x2.4 pulmonary nodule in the RUBEN as well as multiple other subcentimeter nodules. Former smoker of about 50 pack years. Denies any fever, chills, n/v. Mentions some weight loss of 10lbs over 2-3 months.\par \par \par  [TextBox_11] : 50

## 2023-03-23 NOTE — END OF VISIT
[Time Spent: ___ minutes] : I have spent [unfilled] minutes of time on the encounter. [] : Fellow [FreeTextEntry3] : Naomi the case in detail with the patient and the daughter.  Patient was recently admitted with a PE.  The patient has lung lesion that cannot be ruled out as underlying malignancy.  Discussed the plan to proceed with a CT scan guided biopsy to rule out underlying malignancy.  PFT..  PET scan.  Patient is high risk for lung cancer to her smoking.  Patient will require stopping anticoagulation 2 days prior to the biopsy [>50% of the face to face encounter time was spent on counseling and/or coordination of care for ___] : Greater than 50% of the face to face encounter time was spent on counseling and/or coordination of care for [unfilled]

## 2023-03-23 NOTE — ASSESSMENT
[FreeTextEntry1] : Data reviewed-\par V/Q scan (1/2023): multiple bilateral acute PE. \par \par CT chest 2/2023 impression: 1.5 x 2.4 cm irregular subpleural opacity at the anterior left upper \par lobe, possibly representing postradiation treatment changes or \par postobstructive atelectasis from large airway disease. A neoplastic \par lesion cannot be excluded. Comparison with outside prior studies is \par recommended. \par \par TTE CONCLUSIONS (1/2023):\par  1. Normal left ventricular systolic function.\par  2. Mildly dilated right ventricular size.\par  3. Normal right ventricular systolic function.\par  4. Moderate tricuspid regurgitation.\par  5. Pulmonary hypertension present, pulmonary artery systolic pressure is \par 58 mmHg.\par  6. No pericardial effusion.\par \par \par Assessment\par #Pulmonary nodule of the RUBEN measuring 2.4cm\par #History of breast cancer\par #Extensive smoking history\par #Pulmonary embolism, currently on Eliquis\par #Left calf vein DVT\par \par \par Plan:\par Discussed with patient options of either active surveillance or biopsy of the patient's RUBEN anterior nodule. Bacause of the patient's history of breast cancer, extensive smoking history, and recent VTE, this places her at a high pre-test probability of the nodule being malignant. There is a chance that this could be benign (post radiation scarring or infectious), however the patient agreed that she would like to proceed with CT guided transthoracic biopsy. Advised the patient to hold her Eliquis 2-3 days prior to the procedure. PET CT ordered. PFTs ordered to evaluate for obstructive airway disease as well as for potential preop planning if the nodule was malignant. Ordered BMP, CBC, and INR/PTT.\par \par In terms of the VTE , the patient is to continue her Elliquis indefintely for now since is may be 2/2 malignancy. Instructed the patient to follow up after biopsy and we plan to repeat her TTE and CT chest about 6 months after her VTE (~July 2023).

## 2023-03-23 NOTE — REVIEW OF SYSTEMS
[Fatigue] : fatigue [Poor Appetite] : poor appetite [Recent Wt Loss (___ Lbs)] : ~T recent [unfilled] lb weight loss [Negative] : Endocrine [Fever] : no fever [Chills] : no chills

## 2023-03-28 ENCOUNTER — APPOINTMENT (OUTPATIENT)
Dept: NUCLEAR MEDICINE | Facility: HOSPITAL | Age: 73
End: 2023-03-28

## 2023-03-28 ENCOUNTER — OUTPATIENT (OUTPATIENT)
Dept: OUTPATIENT SERVICES | Facility: HOSPITAL | Age: 73
LOS: 1 days | End: 2023-03-28
Payer: MEDICARE

## 2023-03-28 DIAGNOSIS — H57.9 UNSPECIFIED DISORDER OF EYE AND ADNEXA: Chronic | ICD-10-CM

## 2023-03-28 DIAGNOSIS — H72.92 UNSPECIFIED PERFORATION OF TYMPANIC MEMBRANE, LEFT EAR: Chronic | ICD-10-CM

## 2023-03-28 DIAGNOSIS — Z98.890 OTHER SPECIFIED POSTPROCEDURAL STATES: Chronic | ICD-10-CM

## 2023-03-28 DIAGNOSIS — K42.9 UMBILICAL HERNIA WITHOUT OBSTRUCTION OR GANGRENE: Chronic | ICD-10-CM

## 2023-03-28 DIAGNOSIS — M79.673 PAIN IN UNSPECIFIED FOOT: Chronic | ICD-10-CM

## 2023-03-28 LAB — GLUCOSE BLDC GLUCOMTR-MCNC: 101 MG/DL — HIGH (ref 70–99)

## 2023-03-28 PROCEDURE — A9552: CPT

## 2023-03-28 PROCEDURE — 82962 GLUCOSE BLOOD TEST: CPT

## 2023-03-28 PROCEDURE — 78815 PET IMAGE W/CT SKULL-THIGH: CPT | Mod: 26,PI,MH

## 2023-03-28 PROCEDURE — 78815 PET IMAGE W/CT SKULL-THIGH: CPT

## 2023-03-30 ENCOUNTER — APPOINTMENT (OUTPATIENT)
Dept: MAMMOGRAPHY | Facility: HOSPITAL | Age: 73
End: 2023-03-30

## 2023-03-30 ENCOUNTER — APPOINTMENT (OUTPATIENT)
Dept: ULTRASOUND IMAGING | Facility: HOSPITAL | Age: 73
End: 2023-03-30

## 2023-04-06 ENCOUNTER — OUTPATIENT (OUTPATIENT)
Dept: OUTPATIENT SERVICES | Facility: HOSPITAL | Age: 73
LOS: 1 days | End: 2023-04-06
Payer: MEDICARE

## 2023-04-06 ENCOUNTER — APPOINTMENT (OUTPATIENT)
Dept: MRI IMAGING | Facility: HOSPITAL | Age: 73
End: 2023-04-06
Payer: MEDICARE

## 2023-04-06 ENCOUNTER — APPOINTMENT (OUTPATIENT)
Dept: NEUROLOGY | Facility: CLINIC | Age: 73
End: 2023-04-06

## 2023-04-06 DIAGNOSIS — Z98.890 OTHER SPECIFIED POSTPROCEDURAL STATES: Chronic | ICD-10-CM

## 2023-04-06 DIAGNOSIS — H57.9 UNSPECIFIED DISORDER OF EYE AND ADNEXA: Chronic | ICD-10-CM

## 2023-04-06 DIAGNOSIS — H72.92 UNSPECIFIED PERFORATION OF TYMPANIC MEMBRANE, LEFT EAR: Chronic | ICD-10-CM

## 2023-04-06 DIAGNOSIS — K42.9 UMBILICAL HERNIA WITHOUT OBSTRUCTION OR GANGRENE: Chronic | ICD-10-CM

## 2023-04-06 DIAGNOSIS — M79.673 PAIN IN UNSPECIFIED FOOT: Chronic | ICD-10-CM

## 2023-04-06 PROCEDURE — 70553 MRI BRAIN STEM W/O & W/DYE: CPT | Mod: MH

## 2023-04-06 PROCEDURE — 70553 MRI BRAIN STEM W/O & W/DYE: CPT | Mod: 26,MH

## 2023-04-06 PROCEDURE — A9585: CPT

## 2023-04-11 ENCOUNTER — APPOINTMENT (OUTPATIENT)
Dept: CT IMAGING | Facility: HOSPITAL | Age: 73
End: 2023-04-11

## 2023-04-11 ENCOUNTER — APPOINTMENT (OUTPATIENT)
Dept: INTERVENTIONAL RADIOLOGY/VASCULAR | Facility: HOSPITAL | Age: 73
End: 2023-04-11

## 2023-04-12 ENCOUNTER — APPOINTMENT (OUTPATIENT)
Dept: PULMONOLOGY | Facility: CLINIC | Age: 73
End: 2023-04-12
Payer: MEDICARE

## 2023-04-12 PROCEDURE — 94727 GAS DIL/WSHOT DETER LNG VOL: CPT

## 2023-04-12 PROCEDURE — 94729 DIFFUSING CAPACITY: CPT

## 2023-04-12 PROCEDURE — 94010 BREATHING CAPACITY TEST: CPT

## 2023-04-17 ENCOUNTER — APPOINTMENT (OUTPATIENT)
Dept: NEUROLOGY | Facility: CLINIC | Age: 73
End: 2023-04-17
Payer: MEDICARE

## 2023-04-17 VITALS
WEIGHT: 163 LBS | TEMPERATURE: 95.6 F | HEART RATE: 95 BPM | OXYGEN SATURATION: 96 % | SYSTOLIC BLOOD PRESSURE: 132 MMHG | HEIGHT: 69 IN | BODY MASS INDEX: 24.14 KG/M2 | DIASTOLIC BLOOD PRESSURE: 87 MMHG

## 2023-04-17 VITALS — SYSTOLIC BLOOD PRESSURE: 144 MMHG | HEART RATE: 90 BPM | DIASTOLIC BLOOD PRESSURE: 89 MMHG

## 2023-04-17 VITALS — DIASTOLIC BLOOD PRESSURE: 83 MMHG | SYSTOLIC BLOOD PRESSURE: 140 MMHG | HEART RATE: 72 BPM

## 2023-04-17 PROCEDURE — 99214 OFFICE O/P EST MOD 30 MIN: CPT

## 2023-04-17 NOTE — HISTORY OF PRESENT ILLNESS
[FreeTextEntry1] : CC follow up of stroke and vertigo. \par patient c/o increasing frequency of vertiginous symptoms that occurs when she stands up, or turns but can also occur when she is sitting. sent for an MRI brain that showed no evidence of new stroke. \par h/o cerebellar stroke. \par \par Also has episodes when the room closes in and lights seem dim especially when she is walking for awhile. \par \par

## 2023-04-17 NOTE — PHYSICAL EXAM
[FreeTextEntry1] : The patient is alert and oriented x3, naming intact x3, repetition normal, follows three-step commands, and is able to participate fully in the history taking.\par Speech is normal with no evidence of dysarthria.\par Memory is intact: Immediate recall 3 out of 3, short-term 3 out of 3, remote memory intact\par Cranial nerves II through XII intact\par Motor exam: Upper and lower extremities 5 out of 5 power, normal tone. No abnormal movements noted.\par Sensory exam: Intact to light touch and pinprick. Romberg negative.\par Coordination and vestibular exam: Finger to nose intact, no evidence of truncal or appendicular ataxia. No evidence of nystagmus. gets vertiginous each time she turns while testing gait. also felt nauseated after the third episode\par Gait: Normal stance and gait. \par Reflexes: One to 2+ in upper and lower extremities. No pathological reflexes. Downgoing toes.\par \par

## 2023-04-17 NOTE — ASSESSMENT
[FreeTextEntry1] : Vertigo - movement elicited - will need vestibular therapy\par No evidence of orthostasis even with standing for 10 min. \par patient to check BP at home\par BP device prescribed.

## 2023-04-26 ENCOUNTER — APPOINTMENT (OUTPATIENT)
Dept: INTERNAL MEDICINE | Facility: CLINIC | Age: 73
End: 2023-04-26
Payer: MEDICARE

## 2023-04-26 VITALS
HEIGHT: 69 IN | SYSTOLIC BLOOD PRESSURE: 131 MMHG | OXYGEN SATURATION: 96 % | BODY MASS INDEX: 24.14 KG/M2 | WEIGHT: 163 LBS | DIASTOLIC BLOOD PRESSURE: 87 MMHG | HEART RATE: 80 BPM | TEMPERATURE: 98.2 F

## 2023-04-26 PROCEDURE — 99214 OFFICE O/P EST MOD 30 MIN: CPT

## 2023-05-01 ENCOUNTER — OUTPATIENT (OUTPATIENT)
Dept: OUTPATIENT SERVICES | Facility: HOSPITAL | Age: 73
LOS: 1 days | Discharge: HOME | End: 2023-05-01

## 2023-05-01 DIAGNOSIS — H72.92 UNSPECIFIED PERFORATION OF TYMPANIC MEMBRANE, LEFT EAR: Chronic | ICD-10-CM

## 2023-05-01 DIAGNOSIS — M79.673 PAIN IN UNSPECIFIED FOOT: Chronic | ICD-10-CM

## 2023-05-01 DIAGNOSIS — Z98.890 OTHER SPECIFIED POSTPROCEDURAL STATES: Chronic | ICD-10-CM

## 2023-05-01 DIAGNOSIS — H57.9 UNSPECIFIED DISORDER OF EYE AND ADNEXA: Chronic | ICD-10-CM

## 2023-05-01 DIAGNOSIS — K42.9 UMBILICAL HERNIA WITHOUT OBSTRUCTION OR GANGRENE: Chronic | ICD-10-CM

## 2023-05-05 ENCOUNTER — APPOINTMENT (OUTPATIENT)
Dept: PSYCHIATRY | Facility: CLINIC | Age: 73
End: 2023-05-05
Payer: MEDICARE

## 2023-05-05 PROCEDURE — 99443: CPT | Mod: 95

## 2023-05-05 NOTE — HISTORY OF PRESENT ILLNESS
[Not Applicable] : Not applicable [FreeTextEntry1] : Patient with chronic dysphoria and with some anxiety related to medical fears and going outside.   [FreeTextEntry2] : Patient reports first coming into psychiatric treatment to [facility] in the late 1980s, probably 1988 per pt.  (Records from [facility] show first appointment in 1996.) \par \par At the time, she stated she was crying a lot, unable to get up, laying in the dark and feeling very unsure of herself.  SHe additionally was afraid of being around crowds.  Patient stated that she was diagnosed with depression and started taking medications for this in the late 80s or early 90s and has been in treatment ever since, mostly psychopharm tx wit some attempts at therapy.   (Appears she started tx in 1996 however)\par \par Patient denied history of psych hospitalizations, suicidality, psychosis or javi though records indicate in 1996 she admitted to thinking about jumping out the window of her 13th story apartment that she shared with her mother.

## 2023-05-05 NOTE — REVIEW OF SYSTEMS
[Negative] : Allergic/Immunologic [de-identified] : sciatica her pt  [de-identified] : dysphoria and anxiety [de-identified] : hx of being hypercoagulable for unclear reasons

## 2023-05-05 NOTE — PHYSICAL EXAM
[Cooperative] : cooperative [Clear] : clear [Linear/Goal Directed] : linear/goal directed [WNL] : within normal limits [No] : No [3] : 3 [4] : 4 [5] : 5 [6] : 6 [FreeTextEntry1] : [facility] [FreeTextEntry8] : mildly depressed [de-identified] : [facility] [de-identified] : adequate [de-identified] : fair [Individual reports tobacco use during the last 30 days?] : Individual reports tobacco use during the last 30 days? No

## 2023-05-05 NOTE — DISCUSSION/SUMMARY
[Date of Last Physical Exam: _____] : Date of Last Physical Exam: [unfilled] [Date of Last Annual Labs: _____] : Date of Last Annual Labs: [unfilled] [Annual Review of Systems Completed?] : Annual Review of Systems Completed: Yes [Tobacco Screening Completed?] : Tobacco Screening Completed: Yes [FreeTextEntry1] : Patient is a 72 year old, single black female and mother of adult daughter whom she has close but conflicted relations with.  Hali has struggled with depression, REGINE, insomnia and some agoraphobic tendencies.  She was recently hospitalized with pulmonary embolism in setting of hypercoaguable work up and currently with sciatica and recent report of elevated calcium level.\par \par Date of last physical exam: pt not sure; gave referral for Chela Keenan for second time today\par Date of last (annual) labs: may order if pt doesn't make apptmt asap  (see labs section for details) \par Annual review of systems completed (Y/N): Yes\par Tobacco Screening Completed (Y/N): yes, quit smoking 1/19/2023\par \par

## 2023-05-05 NOTE — PLAN
[No] : No [Medication education provided] : Medication education provided. [FreeTextEntry4] : Patient gets out of the house at least a few days a week though still at times have periods of staying inside for many consecutive days with curtains closed; patient smoking 8-9 cigarettes a day and reviewed plan to further reduce [FreeTextEntry5] : Offered support/encouragement, psychoeducation and counseling regarding dx, meds, symptoms, etc.\par \par Reviewed/discussed plan below:\par \par Continue Gabapentin at 600 mg qhs and confirm dosage\par May continue Trazodone 200 mg qhs for anxiety and insomnia respectively\par Continue Ramelteon for insomnia\par Continue Wellbutrin at  mg for both smoking cessation and to augment for depression and energy\par To review smoking and alcohol use with screeners\par To review medical status and consider labs prn\par May consider therapy in future\par Contact writer prn\par \par 5/5/22: SOS-10: 52 minimally impaired\par 1/6/23: SOS-10: 42, minimally impaired\par \par 30 minutes spent reviewing prior records/notes, seeing patient and recording session note\par

## 2023-05-05 NOTE — REASON FOR VISIT
[Telephone (audio) - Individual/Group] : This telephonic visit was provided via audio only technology. [Other Location: e.g. Home (Enter Location, City,State)___] : The provider was located at [unfilled]. [Home] : The patient, [unfilled], was located at home, [unfilled], at the time of the visit. [Verbal consent obtained from patient/other participant(s)] : Verbal consent for telehealth/telephonic services obtained from patient/other participant(s) [Patient] : Patient [FreeTextEntry1] : follow up treatment of REGINE, MDD mild, dysthymia, insomnia etc

## 2023-05-09 ENCOUNTER — APPOINTMENT (OUTPATIENT)
Dept: INTERNAL MEDICINE | Facility: CLINIC | Age: 73
End: 2023-05-09
Payer: MEDICARE

## 2023-05-09 VITALS
HEIGHT: 69 IN | BODY MASS INDEX: 24.14 KG/M2 | TEMPERATURE: 98 F | SYSTOLIC BLOOD PRESSURE: 124 MMHG | OXYGEN SATURATION: 98 % | DIASTOLIC BLOOD PRESSURE: 83 MMHG | WEIGHT: 163 LBS | HEART RATE: 71 BPM

## 2023-05-09 DIAGNOSIS — M54.30 SCIATICA, UNSPECIFIED SIDE: ICD-10-CM

## 2023-05-09 PROCEDURE — 99214 OFFICE O/P EST MOD 30 MIN: CPT

## 2023-05-23 ENCOUNTER — APPOINTMENT (OUTPATIENT)
Dept: PSYCHIATRY | Facility: CLINIC | Age: 73
End: 2023-05-23
Payer: MEDICARE

## 2023-05-23 DIAGNOSIS — F32.0 MAJOR DEPRESSIVE DISORDER, SINGLE EPISODE, MILD: ICD-10-CM

## 2023-05-23 PROCEDURE — 99443: CPT | Mod: 95

## 2023-05-23 RX ORDER — ASPIRIN 81 MG/1
81 TABLET ORAL DAILY
Qty: 30 | Refills: 3 | Status: DISCONTINUED | COMMUNITY
Start: 2020-06-15 | End: 2023-05-23

## 2023-05-23 NOTE — REASON FOR VISIT
[Telephone (audio) - Individual/Group] : This telephonic visit was provided via audio only technology. [Home] : The patient, [unfilled], was located at home, [unfilled], at the time of the visit. [Verbal consent obtained from patient/other participant(s)] : Verbal consent for telehealth/telephonic services obtained from patient/other participant(s) [Patient] : Patient [Medical Office: ([facility])___] : The provider was located at the medical office in [unfilled]. [FreeTextEntry1] : follow up treatment of REGINE, MDD mild, dysthymia, insomnia etc

## 2023-05-23 NOTE — PHYSICAL EXAM
[Cooperative] : cooperative [Clear] : clear [Linear/Goal Directed] : linear/goal directed [WNL] : within normal limits [No] : No [3] : 3 [4] : 4 [5] : 5 [6] : 6 [FreeTextEntry1] : [facility] [FreeTextEntry8] : mildly depressed [de-identified] : [facility] [de-identified] : adequate [de-identified] : fair [Individual reports tobacco use during the last 30 days?] : Individual reports tobacco use during the last 30 days? No

## 2023-05-23 NOTE — HISTORY OF PRESENT ILLNESS
[Not Applicable] : Not applicable [FreeTextEntry1] : Patient with chronic dysphoria and with some anxiety related to medical fears and going outside.   [FreeTextEntry2] : Patient reports first coming into psychiatric treatment to [facility] in the late 1980s, probably 1988 per pt.  (Records from [facility] show first appointment in 1996.) \par \par At the time, she stated she was crying a lot, unable to get up, laying in the dark and feeling very unsure of herself.  SHe additionally was afraid of being around crowds.  Patient stated that she was diagnosed with depression and started taking medications for this in the late 80s or early 90s and has been in treatment ever since, mostly psychopharm tx wit some attempts at therapy.   (Appears she started tx in 1996 however)\par \par Patient denied history of psych hospitalizations, suicidality, psychosis or javi though records indicate in 1996 she admitted to thinking about jumping out the window of her 13th story apartment that she shared with her mother.

## 2023-05-23 NOTE — PLAN
[No] : No [Medication education provided] : Medication education provided. [FreeTextEntry4] : Patient gets out of the house at least a few days a week though still at times have periods of staying inside for many consecutive days with curtains closed; patient smoking 8-9 cigarettes a day and reviewed plan to further reduce [FreeTextEntry5] : Offered support/encouragement, psychoeducation and counseling regarding dx, meds, symptoms, etc.\par \par Reviewed/discussed plan below:\par \par May continue Gabapentin at 600 mg qhs prn\par May continue Trazodone 200 mg qhs for anxiety and insomnia respectively\par Continue Ramelteon for insomnia\par Continue Wellbutrin at  mg for both smoking cessation and to augment for depression and energy and f/u about efficacy\par To review smoking and alcohol use with screeners, next session?\par Patient may consider adding on 1:1 therapy and/or group in future\par Repeat SOS-10 in near future\par Contact writer prn\par \par 5/5/22: SOS-10: 52 minimally impaired\par 1/6/23: SOS-10: 42, minimally impaired\par \par 30 minutes spent reviewing prior records/notes, seeing patient and recording session note\par

## 2023-05-23 NOTE — DISCUSSION/SUMMARY
[Date of Last Physical Exam: _____] : Date of Last Physical Exam: [unfilled] [Date of Last Annual Labs: _____] : Date of Last Annual Labs: [unfilled] [Annual Review of Systems Completed?] : Annual Review of Systems Completed: Yes [Tobacco Screening Completed?] : Tobacco Screening Completed: Yes [FreeTextEntry1] : Patient is a 72 year old, single black female and mother of adult daughter whom she has close but conflicted relations with.  Hali has struggled with depression, REGINE, insomnia and some agoraphobic tendencies.  \par \par She was hospitalized in late 2022/2023? with pulmonary embolism in setting of hypercoagulable work up; history of possible prior CVA; currently with sciatica and recent report of elevated calcium level.\par

## 2023-06-05 NOTE — CONSULT NOTE ADULT - PROVIDER SPECIALTY LIST ADULT
Heme/Onc
Pulmonology
[None] : no edema
[No CVA Tenderness] : no CVA  tenderness
Rehab Medicine
[Normal] : oriented to person, place, and time

## 2023-06-12 DIAGNOSIS — F33.1 MAJOR DEPRESSIVE DISORDER, RECURRENT, MODERATE: ICD-10-CM

## 2023-06-23 ENCOUNTER — APPOINTMENT (OUTPATIENT)
Dept: PSYCHIATRY | Facility: CLINIC | Age: 73
End: 2023-06-23

## 2023-06-26 ENCOUNTER — APPOINTMENT (OUTPATIENT)
Dept: PSYCHIATRY | Facility: CLINIC | Age: 73
End: 2023-06-26
Payer: MEDICARE

## 2023-06-26 PROCEDURE — 99443: CPT | Mod: 95

## 2023-06-26 NOTE — HISTORY OF PRESENT ILLNESS
[Not Applicable] : Not applicable [FreeTextEntry1] : Patient with chronic dysphoria and with some anxiety related to medical fears and going outside.   [FreeTextEntry2] : Patient reports first coming into psychiatric treatment to [facility] in the late 1980s, probably 1988 per pt.  (Records from [facility] show first appointment in 1996.) \par \par At the time, she stated she was crying a lot, unable to get up, laying in the dark and feeling very unsure of herself.  SHe additionally was afraid of being around crowds.  Patient stated that she was diagnosed with depression and started taking medications for this in the late 80s or early 90s and has been in treatment ever since, mostly psychopharm tx wit some attempts at therapy.   (Appears she started tx in 1996 however)\par \par Patient denied history of psych hospitalizations, suicidality, psychosis or javi though records indicate in 1996 she admitted to thinking about jumping out the window of her 13th story apartment that she shared with her mother.

## 2023-06-26 NOTE — PLAN
[No] : No [Medication education provided] : Medication education provided. [FreeTextEntry4] : Patient gets out of the house at least a few days a week though still at times have periods of staying inside for many consecutive days with curtains closed; patient smoking 8-9 cigarettes a day and reviewed plan to further reduce [FreeTextEntry5] : Offered support/encouragement, psychoeducation and counseling regarding dx, meds, symptoms, etc.\par \par Reviewed/discussed plan below:\par \par May continue Gabapentin at 600 mg qhs prn\par May continue Trazodone 200 mg qhs for anxiety and insomnia respectively\par Continue Ramelteon for insomnia\par Continue Wellbutrin at  mg for both smoking cessation and to augment for depression and energy and f/u about efficacy\par To review alcohol screeners \par To continue to encourage use of nicotine patches and support patient in abstinence\par Patient may consider adding on 1:1 therapy and/or group in future\par [facility] [place] [facility]\par Pt to contact writer 7/3,7/6 or 7/7 for renewal of any psych meds if she runs out mid-vacation in [place]\par Contact writer prn\par \par 5/5/22: SOS-10: 52 minimally impaired\par 1/6/23: SOS-10: 42, minimally impaired\par \par 30 minutes spent reviewing prior records/notes, seeing patient and recording session note\par

## 2023-06-26 NOTE — REVIEW OF SYSTEMS
[Negative] : Allergic/Immunologic [de-identified] : sciatica, improved [de-identified] : dysphoria and anxiety [de-identified] : wnl currently but with history of being hypercoagulable for a period of time for unclear reasons

## 2023-06-26 NOTE — PHYSICAL EXAM
[Cooperative] : cooperative [Clear] : clear [Linear/Goal Directed] : linear/goal directed [WNL] : within normal limits [No] : No [3] : 3 [4] : 4 [5] : 5 [6] : 6 [FreeTextEntry1] : [facility] [FreeTextEntry8] : mildly depressed [de-identified] : [facility] [de-identified] : adequate [de-identified] : fair [Individual reports tobacco use during the last 30 days?] : Individual reports tobacco use during the last 30 days? No

## 2023-06-26 NOTE — REASON FOR VISIT
[Telephone (audio) - Individual/Group] : This telephonic visit was provided via audio only technology. [Other Location: e.g. Home (Enter Location, City,State)___] : The provider was located at [unfilled]. [Home] : The patient, [unfilled], was located at home, [unfilled], at the time of the visit. [Verbal consent obtained from patient/other participant(s)] : Verbal consent for telehealth/telephonic services obtained from patient/other participant(s) [Patient] : Patient [FreeTextEntry1] : follow up treatment of dysthymia, REGINE and insomnia

## 2023-06-26 NOTE — DISCUSSION/SUMMARY
[Date of Last Physical Exam: _____] : Date of Last Physical Exam: [unfilled] [Date of Last Annual Labs: _____] : Date of Last Annual Labs: [unfilled] [Annual Review of Systems Completed?] : Annual Review of Systems Completed: Yes [Tobacco Screening Completed?] : Tobacco Screening Completed: Yes [FreeTextEntry1] : Patient is a 72 year old, single black female and mother of adult daughter whom she has close but conflicted relations with.  Hali has struggled with depression, REGINE, insomnia and some agoraphobic tendencies.  \par \par She was hospitalized in late 2022/2023? with pulmonary embolism in setting of hypercoagulable work up; history of possible prior CVA; currently with improved sciatica and recent report of elevated calcium level.  Having cigarette cravings.  \par

## 2023-07-14 ENCOUNTER — APPOINTMENT (OUTPATIENT)
Dept: PSYCHIATRY | Facility: CLINIC | Age: 73
End: 2023-07-14

## 2023-08-11 ENCOUNTER — APPOINTMENT (OUTPATIENT)
Dept: PSYCHIATRY | Facility: CLINIC | Age: 73
End: 2023-08-11
Payer: MEDICARE

## 2023-08-11 DIAGNOSIS — Z78.9 OTHER SPECIFIED HEALTH STATUS: ICD-10-CM

## 2023-08-11 DIAGNOSIS — F17.200 NICOTINE DEPENDENCE, UNSPECIFIED, UNCOMPLICATED: ICD-10-CM

## 2023-08-11 PROCEDURE — 99215 OFFICE O/P EST HI 40 MIN: CPT

## 2023-08-11 NOTE — HISTORY OF PRESENT ILLNESS
[Not Applicable] : Not applicable [FreeTextEntry1] : Patient with chronic dysphoria and with some anxiety related to medical fears and going outside.   [FreeTextEntry2] : Patient reports first coming into psychiatric treatment to [facility] in the late 1980s, probably 1988 per pt.  (Records from [facility] show first appointment in 1996.) \par  \par  At the time, she stated she was crying a lot, unable to get up, laying in the dark and feeling very unsure of herself.  SHe additionally was afraid of being around crowds.  Patient stated that she was diagnosed with depression and started taking medications for this in the late 80s or early 90s and has been in treatment ever since, mostly psychopharm tx wit some attempts at therapy.   (Appears she started tx in 1996 however)\par  \par  Patient denied history of psych hospitalizations, suicidality, psychosis or javi though records indicate in 1996 she admitted to thinking about jumping out the window of her 13th story apartment that she shared with her mother.

## 2023-08-11 NOTE — RISK ASSESSMENT
[No, patient denies ideation or behavior] : No, patient denies ideation or behavior [No] : No [Low acute suicide risk] : Low acute suicide risk [Not clinically indicated] : Safety Plan completed/updated (for individuals at risk): Not clinically indicated

## 2023-08-11 NOTE — REVIEW OF SYSTEMS
[Negative] : Allergic/Immunologic [de-identified] : sciatica [de-identified] : dysphoria and anxiety [de-identified] : wnl currently but with history of being hypercoagulable for a period of time for unclear reasons

## 2023-08-11 NOTE — DISCUSSION/SUMMARY
[Date of Last Physical Exam: _____] : Date of Last Physical Exam: [unfilled] [Date of Last Annual Labs: _____] : Date of Last Annual Labs: [unfilled] [Annual Review of Systems Completed?] : Annual Review of Systems Completed: Yes [Tobacco Screening Completed?] : Tobacco Screening Completed: Yes [FreeTextEntry1] : Patient is a 72 year old, single black female and mother of adult daughter whom she has close but conflicted relations with.  Hali has struggled with depression, REGINE, insomnia and some agoraphobic tendencies.    She was hospitalized in late 2022/2023? with pulmonary embolism in setting of hypercoagulable work up; history of possible prior CVA and current sciatica. Relapsed with smoking in July 2023 when visiting family in [place].

## 2023-08-11 NOTE — PHYSICAL EXAM
[Cooperative] : cooperative [Clear] : clear [Linear/Goal Directed] : linear/goal directed [WNL] : within normal limits [2] : 2 [4] : 4 [3] : 3 [Individual reports tobacco use during the last 30 days?] : Individual reports tobacco use during the last 30 days? Yes [Individual reports use of the following tobacco products during the last 30 days?] : Individual reports use of the following tobacco products during the last 30 days? Yes -  [Cigarettes] : Cigarettes [Individual reports current use of tobacco cessation medication or nicotine replacement therapy?] : Individual reports current use of tobacco cessation medication or nicotine replacement therapy? Yes [Yes] : Yes [After 60 minutes (0)] : After 60 minutes (0) [Any other (0)] : Any other (0) [10 or less (0)] : 10 or less (0) [No (0)] : No (0) [4 - 4 or more times a week] : 4 - 4 or more times a week [1 - 3 or 4] : 1 - 3 or 4 [0 - Never] : 0 - Never [0 - No] : 0 - No [FreeTextEntry8] : mildly depressed [de-identified] : adequate [de-identified] : fair to limited; (at times delays needed doctor visit, resumed smoking etc) [Does individual accept referral to MD for cessation medication or NRT?] : Does individual accept referral to MD for cessation medication or NRT? No [FreeTextEntry1] : 5 [SchwartzScore] : 24

## 2023-08-11 NOTE — PLAN
[No] : No [Medication education provided] : Medication education provided. [FreeTextEntry4] : Patient gets out of the house at least a few days a week though still at times have periods of staying inside for many consecutive days with curtains closed; patient smoking 8-9 cigarettes a day and reviewed plan to further reduce [FreeTextEntry5] : Offered support/encouragement, psychoeducation and counseling regarding dx, meds, symptoms, etc.  Reviewed/discussed plan below:  May continue Gabapentin at 600 mg qhs prn May continue Trazodone 200 mg qhs for anxiety and insomnia respectively Continue Ramelteon for insomnia Continue Wellbutrin at  mg for both smoking cessation and to augment for depression and energy and f/u about efficacy Pt has resumed smoking while away with family in [place] and smoking 6-7 a day Patient may consider adding on 1:1 therapy and/or group in future Did SOS-10, alcohol screener and smoking screeners today Contact writer prn  5/5/22: SOS-10: 52 minimally impaired 1/6/23: SOS-10: 42, minimally impaired 8/11/23: SOS-10: 24, moderately impaired  55 minutes spent reviewing prior records/notes, seeing patient and recording session note

## 2023-09-08 ENCOUNTER — APPOINTMENT (OUTPATIENT)
Dept: PSYCHIATRY | Facility: CLINIC | Age: 73
End: 2023-09-08

## 2023-09-29 ENCOUNTER — APPOINTMENT (OUTPATIENT)
Dept: PSYCHIATRY | Facility: CLINIC | Age: 73
End: 2023-09-29

## 2023-10-07 ENCOUNTER — RESULT REVIEW (OUTPATIENT)
Age: 73
End: 2023-10-07

## 2023-10-07 ENCOUNTER — OUTPATIENT (OUTPATIENT)
Dept: OUTPATIENT SERVICES | Facility: HOSPITAL | Age: 73
LOS: 1 days | End: 2023-10-07
Payer: MEDICARE

## 2023-10-07 ENCOUNTER — APPOINTMENT (OUTPATIENT)
Dept: CT IMAGING | Facility: HOSPITAL | Age: 73
End: 2023-10-07

## 2023-10-07 DIAGNOSIS — H72.92 UNSPECIFIED PERFORATION OF TYMPANIC MEMBRANE, LEFT EAR: Chronic | ICD-10-CM

## 2023-10-07 DIAGNOSIS — K42.9 UMBILICAL HERNIA WITHOUT OBSTRUCTION OR GANGRENE: Chronic | ICD-10-CM

## 2023-10-07 DIAGNOSIS — Z98.890 OTHER SPECIFIED POSTPROCEDURAL STATES: Chronic | ICD-10-CM

## 2023-10-07 DIAGNOSIS — H57.9 UNSPECIFIED DISORDER OF EYE AND ADNEXA: Chronic | ICD-10-CM

## 2023-10-07 DIAGNOSIS — M79.673 PAIN IN UNSPECIFIED FOOT: Chronic | ICD-10-CM

## 2023-10-07 PROCEDURE — 71250 CT THORAX DX C-: CPT | Mod: 26,MH

## 2023-10-07 PROCEDURE — 71250 CT THORAX DX C-: CPT | Mod: MH

## 2023-10-25 ENCOUNTER — APPOINTMENT (OUTPATIENT)
Dept: INTERNAL MEDICINE | Facility: CLINIC | Age: 73
End: 2023-10-25
Payer: MEDICARE

## 2023-10-25 VITALS
TEMPERATURE: 97.3 F | HEIGHT: 69 IN | WEIGHT: 162 LBS | DIASTOLIC BLOOD PRESSURE: 86 MMHG | OXYGEN SATURATION: 98 % | HEART RATE: 91 BPM | SYSTOLIC BLOOD PRESSURE: 129 MMHG | BODY MASS INDEX: 23.99 KG/M2

## 2023-10-25 DIAGNOSIS — C50.919 MALIGNANT NEOPLASM OF UNSPECIFIED SITE OF UNSPECIFIED FEMALE BREAST: ICD-10-CM

## 2023-10-25 PROCEDURE — 99214 OFFICE O/P EST MOD 30 MIN: CPT

## 2023-10-26 ENCOUNTER — APPOINTMENT (OUTPATIENT)
Dept: PULMONOLOGY | Facility: CLINIC | Age: 73
End: 2023-10-26

## 2023-11-01 ENCOUNTER — OUTPATIENT (OUTPATIENT)
Dept: OUTPATIENT SERVICES | Facility: HOSPITAL | Age: 73
LOS: 1 days | Discharge: HOME | End: 2023-11-01

## 2023-11-01 DIAGNOSIS — H72.92 UNSPECIFIED PERFORATION OF TYMPANIC MEMBRANE, LEFT EAR: Chronic | ICD-10-CM

## 2023-11-01 DIAGNOSIS — Z98.890 OTHER SPECIFIED POSTPROCEDURAL STATES: Chronic | ICD-10-CM

## 2023-11-01 DIAGNOSIS — M79.673 PAIN IN UNSPECIFIED FOOT: Chronic | ICD-10-CM

## 2023-11-01 DIAGNOSIS — K42.9 UMBILICAL HERNIA WITHOUT OBSTRUCTION OR GANGRENE: Chronic | ICD-10-CM

## 2023-11-01 DIAGNOSIS — H57.9 UNSPECIFIED DISORDER OF EYE AND ADNEXA: Chronic | ICD-10-CM

## 2023-11-03 ENCOUNTER — APPOINTMENT (OUTPATIENT)
Dept: PSYCHIATRY | Facility: CLINIC | Age: 73
End: 2023-11-03
Payer: MEDICARE

## 2023-11-03 PROCEDURE — 99215 OFFICE O/P EST HI 40 MIN: CPT

## 2023-11-16 ENCOUNTER — APPOINTMENT (OUTPATIENT)
Dept: SURGERY | Facility: CLINIC | Age: 73
End: 2023-11-16
Payer: MEDICARE

## 2023-11-16 VITALS
HEART RATE: 91 BPM | BODY MASS INDEX: 23.99 KG/M2 | DIASTOLIC BLOOD PRESSURE: 85 MMHG | WEIGHT: 162 LBS | HEIGHT: 69 IN | OXYGEN SATURATION: 99 % | SYSTOLIC BLOOD PRESSURE: 125 MMHG | TEMPERATURE: 96.8 F

## 2023-11-16 PROCEDURE — 99204 OFFICE O/P NEW MOD 45 MIN: CPT

## 2023-11-27 ENCOUNTER — APPOINTMENT (OUTPATIENT)
Dept: PULMONOLOGY | Facility: CLINIC | Age: 73
End: 2023-11-27
Payer: MEDICARE

## 2023-11-27 VITALS
WEIGHT: 160 LBS | BODY MASS INDEX: 23.7 KG/M2 | DIASTOLIC BLOOD PRESSURE: 88 MMHG | HEIGHT: 69 IN | TEMPERATURE: 98.1 F | SYSTOLIC BLOOD PRESSURE: 148 MMHG | HEART RATE: 100 BPM | OXYGEN SATURATION: 96 %

## 2023-11-27 PROCEDURE — 99406 BEHAV CHNG SMOKING 3-10 MIN: CPT

## 2023-11-27 PROCEDURE — 99214 OFFICE O/P EST MOD 30 MIN: CPT | Mod: 25

## 2023-11-30 ENCOUNTER — APPOINTMENT (OUTPATIENT)
Dept: OBGYN | Facility: CLINIC | Age: 73
End: 2023-11-30
Payer: MEDICARE

## 2023-11-30 VITALS — DIASTOLIC BLOOD PRESSURE: 70 MMHG | BODY MASS INDEX: 24.07 KG/M2 | WEIGHT: 163 LBS | SYSTOLIC BLOOD PRESSURE: 120 MMHG

## 2023-11-30 DIAGNOSIS — N89.8 OTHER SPECIFIED NONINFLAMMATORY DISORDERS OF VAGINA: ICD-10-CM

## 2023-11-30 PROCEDURE — 99203 OFFICE O/P NEW LOW 30 MIN: CPT

## 2023-12-01 LAB
CANDIDA VAG CYTO: NOT DETECTED
G VAGINALIS+PREV SP MTYP VAG QL MICRO: NOT DETECTED
T VAGINALIS VAG QL WET PREP: NOT DETECTED

## 2023-12-08 ENCOUNTER — APPOINTMENT (OUTPATIENT)
Dept: PSYCHIATRY | Facility: CLINIC | Age: 73
End: 2023-12-08

## 2023-12-12 DIAGNOSIS — F41.9 ANXIETY DISORDER, UNSPECIFIED: ICD-10-CM

## 2023-12-14 ENCOUNTER — APPOINTMENT (OUTPATIENT)
Dept: PULMONOLOGY | Facility: CLINIC | Age: 73
End: 2023-12-14
Payer: MEDICARE

## 2023-12-14 DIAGNOSIS — J43.9 EMPHYSEMA, UNSPECIFIED: ICD-10-CM

## 2023-12-14 PROCEDURE — 99442: CPT | Mod: 95

## 2023-12-21 ENCOUNTER — NON-APPOINTMENT (OUTPATIENT)
Age: 73
End: 2023-12-21

## 2023-12-28 ENCOUNTER — RX RENEWAL (OUTPATIENT)
Age: 73
End: 2023-12-28

## 2024-01-02 ENCOUNTER — LABORATORY RESULT (OUTPATIENT)
Age: 74
End: 2024-01-02

## 2024-01-02 ENCOUNTER — OUTPATIENT (OUTPATIENT)
Dept: OUTPATIENT SERVICES | Facility: HOSPITAL | Age: 74
LOS: 1 days | End: 2024-01-02

## 2024-01-02 ENCOUNTER — RESULT REVIEW (OUTPATIENT)
Age: 74
End: 2024-01-02

## 2024-01-02 ENCOUNTER — OUTPATIENT (OUTPATIENT)
Dept: OUTPATIENT SERVICES | Facility: HOSPITAL | Age: 74
LOS: 1 days | End: 2024-01-02
Payer: MEDICARE

## 2024-01-02 DIAGNOSIS — K42.9 UMBILICAL HERNIA WITHOUT OBSTRUCTION OR GANGRENE: Chronic | ICD-10-CM

## 2024-01-02 DIAGNOSIS — M79.673 PAIN IN UNSPECIFIED FOOT: Chronic | ICD-10-CM

## 2024-01-02 DIAGNOSIS — H57.9 UNSPECIFIED DISORDER OF EYE AND ADNEXA: Chronic | ICD-10-CM

## 2024-01-02 DIAGNOSIS — H72.92 UNSPECIFIED PERFORATION OF TYMPANIC MEMBRANE, LEFT EAR: Chronic | ICD-10-CM

## 2024-01-02 DIAGNOSIS — Z98.890 OTHER SPECIFIED POSTPROCEDURAL STATES: Chronic | ICD-10-CM

## 2024-01-02 DIAGNOSIS — F17.200 NICOTINE DEPENDENCE, UNSPECIFIED, UNCOMPLICATED: ICD-10-CM

## 2024-01-02 PROCEDURE — 94618 PULMONARY STRESS TESTING: CPT

## 2024-01-02 PROCEDURE — 71046 X-RAY EXAM CHEST 2 VIEWS: CPT

## 2024-01-02 PROCEDURE — 93005 ELECTROCARDIOGRAM TRACING: CPT

## 2024-01-02 PROCEDURE — 71046 X-RAY EXAM CHEST 2 VIEWS: CPT | Mod: 26

## 2024-01-02 PROCEDURE — 94729 DIFFUSING CAPACITY: CPT

## 2024-01-02 PROCEDURE — 94060 EVALUATION OF WHEEZING: CPT

## 2024-01-02 PROCEDURE — 94726 PLETHYSMOGRAPHY LUNG VOLUMES: CPT

## 2024-01-02 PROCEDURE — 94760 N-INVAS EAR/PLS OXIMETRY 1: CPT

## 2024-01-04 ENCOUNTER — APPOINTMENT (OUTPATIENT)
Dept: GASTROENTEROLOGY | Facility: CLINIC | Age: 74
End: 2024-01-04
Payer: MEDICARE

## 2024-01-04 VITALS
DIASTOLIC BLOOD PRESSURE: 70 MMHG | WEIGHT: 164 LBS | HEART RATE: 95 BPM | TEMPERATURE: 96.3 F | SYSTOLIC BLOOD PRESSURE: 140 MMHG | RESPIRATION RATE: 16 BRPM | OXYGEN SATURATION: 96 %

## 2024-01-04 DIAGNOSIS — R63.4 ABNORMAL WEIGHT LOSS: ICD-10-CM

## 2024-01-04 DIAGNOSIS — Z12.11 ENCOUNTER FOR SCREENING FOR MALIGNANT NEOPLASM OF COLON: ICD-10-CM

## 2024-01-04 PROCEDURE — 99205 OFFICE O/P NEW HI 60 MIN: CPT

## 2024-01-04 RX ORDER — SODIUM SULFATE, POTASSIUM SULFATE AND MAGNESIUM SULFATE 1.6; 3.13; 17.5 G/177ML; G/177ML; G/177ML
17.5-3.13-1.6 SOLUTION ORAL
Qty: 1 | Refills: 0 | Status: DISCONTINUED | COMMUNITY
Start: 2024-01-04 | End: 2024-01-04

## 2024-01-04 NOTE — ASSESSMENT
[FreeTextEntry1] : Impression: #Abnormal weight loss #Decreased appetite #Hx colon polyps  Plan: - Schedule upper endoscopy to r/o gastric cancer - Schedule colonoscopy for hx polyps at [facility] - Risks (including anesthesia complications, bleeding, infection, perforation) as well as benefits, alternatives, and details of both procedures discussed w/ patient. - Prep instructions discussed at length and written materials provided. - 2 day prep ordered. - Need for chaperone discussed.

## 2024-01-04 NOTE — HISTORY OF PRESENT ILLNESS
[FreeTextEntry1] : 73 yof with h/o CVA, PE, HTN, breast cancer s/p lumpectomy current smoker  HPI- not sure why here in September 2023 noticed weight loss, ~ 10 pounds  lack of appetite but no other UGI sx. Eating half of what she used to eat. No medication changes or other changes during this time. No change in bowel habits - BM once a week for many years.   [place] stool score- Type 4  Colon cancer screening- unsure when last cscope was, maybe 10 year ago, has had issues w/ having adequate prep in the past. She has had polyps but unsure of details.  PMHx- CVA/PE on Eliquis, breast cancer as above PSHx-none  Rx- see chart  Supplements/herbs/OTC- none  A/c or NSAIDs? none  FHx- no GI cancers  Allergies- shellfish and contrast  ETOH- rare  Smoking- 3 cigs per day now  Drugs- none  Diet-  CBC reviewed, no anemia or microcytosis CMP reviewed, normal liver enzymes A1c reviewed

## 2024-01-08 ENCOUNTER — APPOINTMENT (OUTPATIENT)
Dept: NEUROLOGY | Facility: CLINIC | Age: 74
End: 2024-01-08
Payer: MEDICARE

## 2024-01-08 VITALS
DIASTOLIC BLOOD PRESSURE: 86 MMHG | BODY MASS INDEX: 23.99 KG/M2 | TEMPERATURE: 97.4 F | SYSTOLIC BLOOD PRESSURE: 135 MMHG | HEIGHT: 69 IN | HEART RATE: 88 BPM | WEIGHT: 162 LBS | OXYGEN SATURATION: 98 %

## 2024-01-08 PROCEDURE — 99213 OFFICE O/P EST LOW 20 MIN: CPT

## 2024-01-08 RX ORDER — BACLOFEN 10 MG/1
10 TABLET ORAL
Qty: 20 | Refills: 0 | Status: DISCONTINUED | COMMUNITY
Start: 2023-05-09 | End: 2024-01-08

## 2024-01-08 RX ORDER — CYCLOBENZAPRINE HYDROCHLORIDE 5 MG/1
5 TABLET, FILM COATED ORAL 3 TIMES DAILY
Qty: 21 | Refills: 0 | Status: DISCONTINUED | COMMUNITY
Start: 2023-05-09 | End: 2024-01-08

## 2024-01-08 RX ORDER — POLYETHYLENE GLYCOL 3350 AND ELECTROLYTES WITH LEMON FLAVOR 236; 22.74; 6.74; 5.86; 2.97 G/4L; G/4L; G/4L; G/4L; G/4L
236 POWDER, FOR SOLUTION ORAL
Qty: 1 | Refills: 0 | Status: DISCONTINUED | COMMUNITY
Start: 2024-01-04 | End: 2024-01-08

## 2024-01-08 RX ORDER — POLYETHYLENE GLYCOL-3350 AND ELECTROLYTES WITH FLAVOR PACK 240; 5.84; 2.98; 6.72; 22.72 G/278.26G; G/278.26G; G/278.26G; G/278.26G; G/278.26G
240 POWDER, FOR SOLUTION ORAL
Qty: 1 | Refills: 0 | Status: DISCONTINUED | COMMUNITY
Start: 2024-01-08 | End: 2024-01-08

## 2024-01-08 RX ORDER — LATANOPROST/PF 0.005 %
0.01 DROPS OPHTHALMIC (EYE) DAILY
Refills: 0 | Status: ACTIVE | COMMUNITY

## 2024-01-08 NOTE — ASSESSMENT
[FreeTextEntry1] : Cerebellar stroke stable.  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  Follow up in three months.

## 2024-01-08 NOTE — HISTORY OF PRESENT ILLNESS
[FreeTextEntry1] : Interval course: Patient still has vertigo that especially happens when she turns her  head or during the exam when she vlosed her eyes and walked.    HPI: CC follow up of stroke and vertigo.  patient c/o increasing frequency of vertiginous symptoms that occurs when she stands up, or turns but can also occur when she is sitting. sent for an MRI brain that showed no evidence of new stroke.  h/o cerebellar stroke.   Also has episodes when the room closes in and lights seem dim especially when she is walking for awhile.

## 2024-01-08 NOTE — PHYSICAL EXAM
[FreeTextEntry1] : The patient is alert and oriented x3, naming intact x3, repetition normal, follows three-step commands, and is able to participate fully in the history taking. Speech is normal with no evidence of dysarthria. Memory is intact: Immediate recall 3 out of 3, short-term 3 out of 3, remote memory intact Cranial nerves II through XII intact - gets vertiginous on looking to the right Motor exam: Upper and lower extremities 5 out of 5 power, normal tone. No abnormal movements noted. Sensory exam: Intact to light touch and pinprick. Romberg negative. Coordination and vestibular exam: Finger to nose dysmetria, no evidence of truncal or appendicular ataxia. No evidence of nystagmus. gets vertiginous each time she turns while testing gait.  Gait: Normal stance and gait.  Reflexes: One to 2+ in upper and lower extremities. No pathological reflexes. Downgoing toes.

## 2024-01-10 ENCOUNTER — APPOINTMENT (OUTPATIENT)
Dept: INTERNAL MEDICINE | Facility: CLINIC | Age: 74
End: 2024-01-10
Payer: MEDICARE

## 2024-01-10 VITALS
HEART RATE: 80 BPM | TEMPERATURE: 97 F | SYSTOLIC BLOOD PRESSURE: 129 MMHG | OXYGEN SATURATION: 97 % | BODY MASS INDEX: 23.99 KG/M2 | WEIGHT: 162 LBS | HEIGHT: 69 IN | DIASTOLIC BLOOD PRESSURE: 81 MMHG

## 2024-01-10 DIAGNOSIS — I26.99 OTHER PULMONARY EMBOLISM W/OUT ACUTE COR PULMONALE: ICD-10-CM

## 2024-01-10 DIAGNOSIS — R63.0 ANOREXIA: ICD-10-CM

## 2024-01-10 DIAGNOSIS — L72.9 FOLLICULAR CYST OF THE SKIN AND SUBCUTANEOUS TISSUE, UNSPECIFIED: ICD-10-CM

## 2024-01-10 DIAGNOSIS — Z01.818 ENCOUNTER FOR OTHER PREPROCEDURAL EXAMINATION: ICD-10-CM

## 2024-01-10 PROCEDURE — G2211 COMPLEX E/M VISIT ADD ON: CPT

## 2024-01-10 PROCEDURE — 99214 OFFICE O/P EST MOD 30 MIN: CPT

## 2024-01-12 ENCOUNTER — APPOINTMENT (OUTPATIENT)
Dept: PSYCHIATRY | Facility: CLINIC | Age: 74
End: 2024-01-12
Payer: MEDICARE

## 2024-01-12 PROCEDURE — 99215 OFFICE O/P EST HI 40 MIN: CPT

## 2024-01-12 NOTE — PHYSICAL EXAM
[Cooperative] : cooperative [Clear] : clear [Linear/Goal Directed] : linear/goal directed [WNL] : within normal limits [Individual reports tobacco use during the last 30 days?] : Individual reports tobacco use during the last 30 days? Yes [Individual reports use of the following tobacco products during the last 30 days?] : Individual reports use of the following tobacco products during the last 30 days? Yes -  [Cigarettes] : Cigarettes [Individual reports current use of tobacco cessation medication or nicotine replacement therapy?] : Individual reports current use of tobacco cessation medication or nicotine replacement therapy? Yes [Yes] : Yes [After 60 minutes (0)] : After 60 minutes (0) [Any other (0)] : Any other (0) [10 or less (0)] : 10 or less (0) [No (0)] : No (0) [4 - 4 or more times a week] : 4 - 4 or more times a week [1 - 3 or 4] : 1 - 3 or 4 [0 - Never] : 0 - Never [0 - No] : 0 - No [2] : 2 [4] : 4 [3] : 3 [Depressed] : depressed [Constricted] : constricted [de-identified] : adequate [de-identified] : fair to limited; (at times delays needed doctor visit, resumed smoking etc) [Does individual accept referral to MD for cessation medication or NRT?] : Does individual accept referral to MD for cessation medication or NRT? No [FreeTextEntry1] : 5 [SchwartzScore] : 24

## 2024-01-12 NOTE — DISCUSSION/SUMMARY
[Date of Last Physical Exam: _____] : Date of Last Physical Exam: [unfilled] [Date of Last Annual Labs: _____] : Date of Last Annual Labs: [unfilled] [Annual Review of Systems Completed?] : Annual Review of Systems Completed: Yes [Tobacco Screening Completed?] : Tobacco Screening Completed: Yes [FreeTextEntry1] : Patient is a 73 year old, single black female and mother of adult daughter whom she has close but conflicted relations with.  Hali has struggled with depression, REGINE, insomnia and some agoraphobic tendencies.    She was hospitalized in late 2022/2023? with pulmonary embolism in setting of hypercoagulable work up; history of possible prior CVA and current sciatica. Relapsed with smoking in July 2023 when visiting family in [place] but has reduced significantly since recently on Chantix started 11/27/23.   Has been more depressed since end of 2023.

## 2024-01-12 NOTE — PLAN
[No] : No [Medication education provided] : Medication education provided. [FreeTextEntry4] : Patient gets out of the house at least a few days a week though still at times have periods of staying inside for many consecutive days with curtains closed; patient smoking 8-9 cigarettes a day and reviewed plan to further reduce [FreeTextEntry5] : Offered support/encouragement, psychoeducation and counseling regarding dx, meds, symptoms, etc.  Reviewed/discussed plan below:  May continue Gabapentin at 600 mg qhs prn May continue Trazodone 200 mg qhs for anxiety and insomnia respectively Continue Ramelteon for insomnia Advised limited alcohol use and increased other fluid intake as well as continued medical follow up Resume Wellbutrin X 150 mg to 300 mg as patient does not seem to have resumed or continued this To continue Chantix  Referred for 1:1 therapy which pt is interested in; (sent task to therapy team on 1/12/24) Have completed SOS-10, alcohol screener and smoking screeners  Contact writer prn  5/5/22: SOS-10: 52 minimally impaired 1/6/23: SOS-10: 42, minimally impaired 8/11/23: SOS-10: 24, moderately impaired  45 minutes spent reviewing prior records/notes, seeing patient and recording session note

## 2024-01-12 NOTE — HISTORY OF PRESENT ILLNESS
[Not Applicable] : Not applicable [FreeTextEntry1] : Patient with chronic dysphoria and with some anxiety related to medical fears and going outside.   [FreeTextEntry2] : Patient reports first coming into psychiatric treatment to [facility] in the late 1980s, probably 1988 per pt.  (Records from [facility] show first appointment in 1996.) \par  \par  At the time, she stated she was crying a lot, unable to get up, laying in the dark and feeling very unsure of herself.  SHe additionally was afraid of being around crowds.  Patient stated that she was diagnosed with depression and started taking medications for this in the late 80s or early 90s and has been in treatment ever since, mostly psychopharm tx wit some attempts at therapy.   (Appears she started tx in 1996 however)\par  \par  Patient denied history of psych hospitalizations, suicidality, psychosis or javi though records indicate in 1996 she admitted to thinking about jumping out the window of her 13th story apartment that she shared with her mother.   [FreeTextEntry3] : Some include:  Paxil, Trazadone, Depakote, Lexapro, Lamictal (briefly started), Duloxetine, Gabapentin, Bupropion, lorazepam, Xanax, Ambien, Ramelteon

## 2024-01-12 NOTE — REVIEW OF SYSTEMS
[Negative] : Allergic/Immunologic [de-identified] : sciatica improved [de-identified] : some increased dysphoria  [de-identified] : wnl currently but with history of being hypercoagulable for a period of time for unclear reasons

## 2024-01-16 ENCOUNTER — APPOINTMENT (OUTPATIENT)
Dept: HEART AND VASCULAR | Facility: CLINIC | Age: 74
End: 2024-01-16
Payer: MEDICARE

## 2024-01-16 VITALS
HEIGHT: 69 IN | DIASTOLIC BLOOD PRESSURE: 78 MMHG | SYSTOLIC BLOOD PRESSURE: 100 MMHG | OXYGEN SATURATION: 98 % | BODY MASS INDEX: 23.99 KG/M2 | WEIGHT: 162 LBS | HEART RATE: 98 BPM | TEMPERATURE: 98.1 F

## 2024-01-16 PROCEDURE — 99203 OFFICE O/P NEW LOW 30 MIN: CPT

## 2024-01-16 NOTE — REVIEW OF SYSTEMS
[Weight Loss (___ Lbs)] : [unfilled] ~Ulb weight loss [Change in Appetite] : change in appetite [Dizziness] : dizziness [Negative] : Psychiatric

## 2024-01-16 NOTE — PHYSICAL EXAM
[General Appearance - Well Developed] : well developed [Normal Appearance] : normal appearance [Well Groomed] : well groomed [General Appearance - Well Nourished] : well nourished [No Deformities] : no deformities [General Appearance - In No Acute Distress] : no acute distress 82

## 2024-01-16 NOTE — ASSESSMENT
[FreeTextEntry1] : She is stable from CV view - OK for surgery pending results of labs.  She has not had a recent lipid profile, so I asked one be done when her pre-op labs are drawn.

## 2024-01-16 NOTE — HISTORY OF PRESENT ILLNESS
[Preoperative Visit] : for a medical evaluation prior to surgery [Scheduled Procedure ___] : a [unfilled] [Stable] : Stable [Fever] : no fever [Chills] : no chills [Fatigue] : no fatigue [Chest Pain] : no chest pain [Cough] : no cough [Dyspnea] : no dyspnea [Dysuria] : no dysuria [Urinary Frequency] : no urinary frequency [Nausea] : no nausea [Vomiting] : no vomiting [Diarrhea] : no diarrhea [Abdominal Pain] : no abdominal pain [Easy Bruising] : no easy bruising [Lower Extremity Swelling] : no lower extremity swelling [Poor Exercise Tolerance] : no poor exercise tolerance [Diabetes] : no diabetes [Cardiovascular Disease] : no cardiovascular disease [Pulmonary Disease] : no pulmonary disease [Anti-Platelet Agents] : no anti-platelet agents [Nicotine Dependence] : no nicotine dependence [Alcohol Use] : no  alcohol use [Renal Disease] : no renal disease [GI Disease] : no gastrointestinal disease [Sleep Apnea] : no sleep apnea [Thromboembolic Problems] : thromboembolic problems [Frequent use of NSAIDs] : no use of NSAIDs [Transfusion Reaction] : no transfusion reaction [Impaired Immunity] : no impaired immunity [Steroid Use in Last 6 Months] : no steroid use in the last six months [Frequent Aspirin Use] : no frequent aspirin use [Prior Anesthesia] : Prior anesthesia [Prev Anesthesia Reaction] : no previous anesthesia reaction [Electrocardiogram] : ~T an ECG ~C was performed [Echocardiogram] : ~T an echocardiogram ~C was performed [Good] : Good [Anesthesia Reaction] : no anesthesia reaction [Sudden Death] : no sudden death [Clotting Disorder] : no clotting disorder [Bleeding Disorder] : no bleeding disorder [FreeTextEntry1] : She had a cerebellar stoke in 4/23 with neg w/u - started Eliquis.  PMH lumpectomy.  Has residual vertigo from CVA.  Found two small SC cysts and is due to have them resected.  EKG at outside facility reviewed - NSR/WNL

## 2024-01-17 ENCOUNTER — NON-APPOINTMENT (OUTPATIENT)
Age: 74
End: 2024-01-17

## 2024-01-17 NOTE — DISCUSSION/SUMMARY
[FreeTextEntry1] : Writer received request for jury duty excusal letter for patient.  Writer drafted letter and will call pt tomorrow to see how she would like to receive it.

## 2024-01-19 ENCOUNTER — APPOINTMENT (OUTPATIENT)
Dept: BARIATRICS | Facility: AMBULATORY SURGERY CENTER | Age: 74
End: 2024-01-19

## 2024-02-01 ENCOUNTER — APPOINTMENT (OUTPATIENT)
Dept: BARIATRICS | Facility: CLINIC | Age: 74
End: 2024-02-01

## 2024-02-01 ENCOUNTER — TRANSCRIPTION ENCOUNTER (OUTPATIENT)
Age: 74
End: 2024-02-01

## 2024-02-01 NOTE — ASU PATIENT PROFILE, ADULT - NSICDXPASTSURGICALHX_GEN_ALL_CORE_FT
PAST SURGICAL HISTORY:  Eardrum rupture, left     Foot pain bilateral bone between pinky toe on the left and the right    H/O lumpectomy right- cancer    Hernia, umbilical as a child    Lesion of eye left eye stye    S/P cataract extraction bilateral

## 2024-02-01 NOTE — ASU PATIENT PROFILE, ADULT - NS PREOP UNDERSTANDS INFO
No solid food/dairy/candy/gum after midnight; water allowed before 10:30am tomorrow; patient reminded to come with photo ID/insurance/credit card; dress in comfortable clothes; no jewelries/contact lens/valuable; no smoking/alcohol drinking/recreational drug use today; escort to have photo ID; address and callback number was given;/yes

## 2024-02-01 NOTE — ASU PATIENT PROFILE, ADULT - FALL HARM RISK - HARM RISK INTERVENTIONS

## 2024-02-01 NOTE — ASU PATIENT PROFILE, ADULT - NSICDXPASTMEDICALHX_GEN_ALL_CORE_FT
PAST MEDICAL HISTORY:  Breast CA right    Depression     Erythrocytosis      PAST MEDICAL HISTORY:  Breast CA right    Depression     Erythrocytosis     H/O insomnia     High cholesterol no meds yet    Stroke 4/2023- no residual weakness    Vertigo

## 2024-02-02 ENCOUNTER — TRANSCRIPTION ENCOUNTER (OUTPATIENT)
Age: 74
End: 2024-02-02

## 2024-02-02 ENCOUNTER — RESULT REVIEW (OUTPATIENT)
Age: 74
End: 2024-02-02

## 2024-02-02 ENCOUNTER — OUTPATIENT (OUTPATIENT)
Dept: OUTPATIENT SERVICES | Facility: HOSPITAL | Age: 74
LOS: 1 days | Discharge: HOME | End: 2024-02-02
Payer: MEDICARE

## 2024-02-02 ENCOUNTER — APPOINTMENT (OUTPATIENT)
Dept: BARIATRICS | Facility: AMBULATORY SURGERY CENTER | Age: 74
End: 2024-02-02
Payer: MEDICARE

## 2024-02-02 VITALS
HEIGHT: 69 IN | TEMPERATURE: 98 F | RESPIRATION RATE: 16 BRPM | HEART RATE: 86 BPM | WEIGHT: 166.67 LBS | SYSTOLIC BLOOD PRESSURE: 135 MMHG | DIASTOLIC BLOOD PRESSURE: 90 MMHG | OXYGEN SATURATION: 100 %

## 2024-02-02 VITALS
SYSTOLIC BLOOD PRESSURE: 149 MMHG | OXYGEN SATURATION: 97 % | DIASTOLIC BLOOD PRESSURE: 74 MMHG | RESPIRATION RATE: 14 BRPM | HEART RATE: 70 BPM

## 2024-02-02 DIAGNOSIS — Z98.49 CATARACT EXTRACTION STATUS, UNSPECIFIED EYE: Chronic | ICD-10-CM

## 2024-02-02 DIAGNOSIS — K42.9 UMBILICAL HERNIA WITHOUT OBSTRUCTION OR GANGRENE: Chronic | ICD-10-CM

## 2024-02-02 DIAGNOSIS — H57.9 UNSPECIFIED DISORDER OF EYE AND ADNEXA: Chronic | ICD-10-CM

## 2024-02-02 DIAGNOSIS — M79.673 PAIN IN UNSPECIFIED FOOT: Chronic | ICD-10-CM

## 2024-02-02 DIAGNOSIS — H72.92 UNSPECIFIED PERFORATION OF TYMPANIC MEMBRANE, LEFT EAR: Chronic | ICD-10-CM

## 2024-02-02 DIAGNOSIS — Z98.890 OTHER SPECIFIED POSTPROCEDURAL STATES: Chronic | ICD-10-CM

## 2024-02-02 PROCEDURE — 11404 EXC TR-EXT B9+MARG 3.1-4 CM: CPT

## 2024-02-02 PROCEDURE — 11424 EXC H-F-NK-SP B9+MARG 3.1-4: CPT

## 2024-02-02 PROCEDURE — 88304 TISSUE EXAM BY PATHOLOGIST: CPT | Mod: 26

## 2024-02-02 RX ORDER — GABAPENTIN 400 MG/1
1 CAPSULE ORAL
Refills: 0 | DISCHARGE

## 2024-02-02 RX ORDER — ASPIRIN/CALCIUM CARB/MAGNESIUM 324 MG
1 TABLET ORAL
Qty: 0 | Refills: 0 | DISCHARGE

## 2024-02-02 RX ORDER — SODIUM CHLORIDE 9 MG/ML
1000 INJECTION, SOLUTION INTRAVENOUS
Refills: 0 | Status: DISCONTINUED | OUTPATIENT
Start: 2024-02-02 | End: 2024-02-02

## 2024-02-02 RX ORDER — ACETAMINOPHEN 500 MG
1000 TABLET ORAL ONCE
Refills: 0 | Status: COMPLETED | OUTPATIENT
Start: 2024-02-02 | End: 2024-02-02

## 2024-02-02 RX ORDER — OXYCODONE HYDROCHLORIDE 5 MG/1
5 TABLET ORAL ONCE
Refills: 0 | Status: DISCONTINUED | OUTPATIENT
Start: 2024-02-02 | End: 2024-02-02

## 2024-02-02 RX ORDER — ACETAMINOPHEN 500 MG
650 TABLET ORAL ONCE
Refills: 0 | Status: DISCONTINUED | OUTPATIENT
Start: 2024-02-02 | End: 2024-02-02

## 2024-02-02 RX ORDER — ONDANSETRON 8 MG/1
4 TABLET, FILM COATED ORAL ONCE
Refills: 0 | Status: DISCONTINUED | OUTPATIENT
Start: 2024-02-02 | End: 2024-02-02

## 2024-02-02 RX ORDER — APREPITANT 80 MG/1
40 CAPSULE ORAL ONCE
Refills: 0 | Status: COMPLETED | OUTPATIENT
Start: 2024-02-02 | End: 2024-02-02

## 2024-02-02 RX ORDER — BUPROPION HYDROCHLORIDE 150 MG/1
1 TABLET, EXTENDED RELEASE ORAL
Qty: 0 | Refills: 0 | DISCHARGE

## 2024-02-02 RX ORDER — APIXABAN 2.5 MG/1
1 TABLET, FILM COATED ORAL
Refills: 0 | DISCHARGE

## 2024-02-02 RX ADMIN — Medication 1000 MILLIGRAM(S): at 12:47

## 2024-02-02 RX ADMIN — APREPITANT 40 MILLIGRAM(S): 80 CAPSULE ORAL at 12:47

## 2024-02-02 NOTE — BRIEF OPERATIVE NOTE - NSICDXBRIEFPROCEDURE_GEN_ALL_CORE_FT
PROCEDURES:  Excision, inclusion cyst 02-Feb-2024 15:32:43 Mid chest epidermal inclusion cyst measuring 2 cm with punctum. Excision of left groin epidermal inclusion cyst with punctum measuring 2 cm Lolly Collins

## 2024-02-02 NOTE — PRE-ANESTHESIA EVALUATION ADULT - NSANTHOSAYNRD_GEN_A_CORE
No. AG screening performed.  STOP BANG Legend: 0-2 = LOW Risk; 3-4 = INTERMEDIATE Risk; 5-8 = HIGH Risk

## 2024-02-02 NOTE — BRIEF OPERATIVE NOTE - OPERATION/FINDINGS
Patient prepped and draped in usual sterile fashion. Epidermal inclusion cyst in chest wall and in left groin measuring about 2 cm with punctum. Cysts excised with 15 blade, hemostasis achieved incisions closed by layers with 3-0 Vicryl and Monocryl 4-0 and Dermabond.

## 2024-02-02 NOTE — ASU DISCHARGE PLAN (ADULT/PEDIATRIC) - ASU DC SPECIAL INSTRUCTIONSFT
- Can shower in 24 hours   - You have Dermabond in your skin, do not remove it or pick at it, it will come out on its own.  - If you have any questions you can call the office   - You can take Tylenol every 6 hours if you have any pain  - Follow up with Dr. Duggan in 1 week. Call the office at [phone] to schedule your appointment. You may shower soap and water over incision sites. Do not scrub. Pat dry when done. No tub bathing or swimming until cleared. Keep incision sites out of the sun as scars will darken.

## 2024-02-02 NOTE — ASU DISCHARGE PLAN (ADULT/PEDIATRIC) - CARE PROVIDER_API CALL
pt is here from group home; states he needs dose of Klonopin for 8 pm, but resident supervisor states they cannot provide med because they don't have the prescription; went to [facility] at 7am this morning for same reason; pt is nonverbal and autistic
[facility]  Surgery  [address] [place] [id number]  Phone: [phone]  Fax: [phone]  Established Patient  Follow Up Time: 1 month

## 2024-02-02 NOTE — PRE-ANESTHESIA EVALUATION ADULT - BP NONINVASIVE DIASTOLIC (MM HG)
Recommend Bilateral upper lid blepharoplasty. Medicare (discussed risks and benefits of sx. .. ). 90

## 2024-02-02 NOTE — ASU PREOP CHECKLIST - ADVANCE DIRECTIVE ADDRESSED/READDRESSED
----- Message from Bonnie Scruggs PA-C sent at 4/10/2018  8:48 AM EDT -----  This patient is early pregnancy; her TSH is low, almost like she is over-treated with her Synthroid  Would recommend that she see whoever treats her for her thyroid disease to adjust her dose  done

## 2024-02-06 LAB — SURGICAL PATHOLOGY STUDY: SIGNIFICANT CHANGE UP

## 2024-02-09 ENCOUNTER — APPOINTMENT (OUTPATIENT)
Dept: PSYCHIATRY | Facility: CLINIC | Age: 74
End: 2024-02-09

## 2024-02-09 PROBLEM — R42 DIZZINESS AND GIDDINESS: Chronic | Status: ACTIVE | Noted: 2024-02-02

## 2024-02-15 ENCOUNTER — APPOINTMENT (OUTPATIENT)
Dept: BARIATRICS | Facility: CLINIC | Age: 74
End: 2024-02-15
Payer: MEDICARE

## 2024-02-15 VITALS
HEART RATE: 87 BPM | SYSTOLIC BLOOD PRESSURE: 134 MMHG | TEMPERATURE: 97.2 F | HEIGHT: 69 IN | BODY MASS INDEX: 24.14 KG/M2 | DIASTOLIC BLOOD PRESSURE: 85 MMHG | OXYGEN SATURATION: 97 % | WEIGHT: 163 LBS

## 2024-02-15 PROCEDURE — 99024 POSTOP FOLLOW-UP VISIT: CPT

## 2024-02-15 NOTE — HISTORY OF PRESENT ILLNESS
[de-identified] : Pt is a 72 y/o F 2 weeks s/p excision of chest wall cyst and L groin cyst who presents today feeling well here for post op care. Pt denies any post op fevers, chest pn, sob, bleeding or oozing from incisions sites, or any severe pain. Pt states she has been keeping her incisions clean and dry and notes some discomfort when sleeping on her side to due location of incision. Recommended sleeping on her back while the incisions heal which pt understands. Path reviewed, epidermal cysts. Pt requesting path to be faxed to oncologist, will send today. No other concerns at this time.

## 2024-02-15 NOTE — END OF VISIT
[Time Spent: ___ minutes] : I have spent [unfilled] minutes of time on the encounter. [FreeTextEntry3] :  All medical record entries made by the Jwiblupe were at my, Dr. ~tanesha~ , direction and personally dictated by me on 02/15/2024 . I have reviewed the chart and agree that the record accurately reflects my personal performance of the history, physical exam, assessment and plan. I have also personally directed, reviewed, and agreed with the chart.

## 2024-02-15 NOTE — PHYSICAL EXAM
[No Rash or Lesion] : No rash or lesion [Alert] : alert [Oriented to Person] : oriented to person [Oriented to Place] : oriented to place [Oriented to Time] : oriented to time [Calm] : calm [de-identified] : not in acute distress [de-identified] : Normal MSK function

## 2024-02-22 ENCOUNTER — APPOINTMENT (OUTPATIENT)
Dept: PSYCHIATRY | Facility: CLINIC | Age: 74
End: 2024-02-22

## 2024-02-22 ENCOUNTER — NON-APPOINTMENT (OUTPATIENT)
Age: 74
End: 2024-02-22

## 2024-02-26 ENCOUNTER — RX RENEWAL (OUTPATIENT)
Age: 74
End: 2024-02-26

## 2024-03-04 PROBLEM — I63.9 CEREBRAL INFARCTION, UNSPECIFIED: Chronic | Status: ACTIVE | Noted: 2024-02-02

## 2024-03-04 PROBLEM — E78.00 PURE HYPERCHOLESTEROLEMIA, UNSPECIFIED: Chronic | Status: ACTIVE | Noted: 2024-02-02

## 2024-03-04 PROBLEM — Z87.898 PERSONAL HISTORY OF OTHER SPECIFIED CONDITIONS: Chronic | Status: ACTIVE | Noted: 2024-02-02

## 2024-03-04 PROBLEM — C50.919 MALIGNANT NEOPLASM OF UNSPECIFIED SITE OF UNSPECIFIED FEMALE BREAST: Chronic | Status: ACTIVE | Noted: 2018-02-15

## 2024-03-05 ENCOUNTER — NON-APPOINTMENT (OUTPATIENT)
Age: 74
End: 2024-03-05

## 2024-03-08 ENCOUNTER — APPOINTMENT (OUTPATIENT)
Dept: GASTROENTEROLOGY | Facility: HOSPITAL | Age: 74
End: 2024-03-08

## 2024-03-08 ENCOUNTER — OUTPATIENT (OUTPATIENT)
Dept: OUTPATIENT SERVICES | Facility: HOSPITAL | Age: 74
LOS: 1 days | Discharge: HOME | End: 2024-03-08
Payer: MEDICARE

## 2024-03-08 ENCOUNTER — RESULT REVIEW (OUTPATIENT)
Age: 74
End: 2024-03-08

## 2024-03-08 DIAGNOSIS — Z98.890 OTHER SPECIFIED POSTPROCEDURAL STATES: Chronic | ICD-10-CM

## 2024-03-08 DIAGNOSIS — K42.9 UMBILICAL HERNIA WITHOUT OBSTRUCTION OR GANGRENE: Chronic | ICD-10-CM

## 2024-03-08 DIAGNOSIS — H72.92 UNSPECIFIED PERFORATION OF TYMPANIC MEMBRANE, LEFT EAR: Chronic | ICD-10-CM

## 2024-03-08 DIAGNOSIS — M79.673 PAIN IN UNSPECIFIED FOOT: Chronic | ICD-10-CM

## 2024-03-08 DIAGNOSIS — F34.1 DYSTHYMIC DISORDER: ICD-10-CM

## 2024-03-08 DIAGNOSIS — F41.1 GENERALIZED ANXIETY DISORDER: ICD-10-CM

## 2024-03-08 DIAGNOSIS — H57.9 UNSPECIFIED DISORDER OF EYE AND ADNEXA: Chronic | ICD-10-CM

## 2024-03-08 DIAGNOSIS — Z98.49 CATARACT EXTRACTION STATUS, UNSPECIFIED EYE: Chronic | ICD-10-CM

## 2024-03-08 PROCEDURE — 45385 COLONOSCOPY W/LESION REMOVAL: CPT

## 2024-03-08 PROCEDURE — 88305 TISSUE EXAM BY PATHOLOGIST: CPT | Mod: 26

## 2024-03-08 PROCEDURE — 43239 EGD BIOPSY SINGLE/MULTIPLE: CPT

## 2024-03-08 PROCEDURE — 88305 TISSUE EXAM BY PATHOLOGIST: CPT

## 2024-03-12 DIAGNOSIS — D75.1 SECONDARY POLYCYTHEMIA: ICD-10-CM

## 2024-03-12 LAB — SURGICAL PATHOLOGY STUDY: SIGNIFICANT CHANGE UP

## 2024-03-15 ENCOUNTER — APPOINTMENT (OUTPATIENT)
Dept: PSYCHIATRY | Facility: CLINIC | Age: 74
End: 2024-03-15

## 2024-03-17 ENCOUNTER — NON-APPOINTMENT (OUTPATIENT)
Age: 74
End: 2024-03-17

## 2024-03-18 DIAGNOSIS — K25.9 GASTRIC ULCER, UNSPECIFIED AS ACUTE OR CHRONIC, W/OUT HEMORRHAGE OR PERFORATION: ICD-10-CM

## 2024-03-18 RX ORDER — OMEPRAZOLE 40 MG/1
40 CAPSULE, DELAYED RELEASE ORAL
Qty: 60 | Refills: 0 | Status: ACTIVE | COMMUNITY
Start: 2024-03-18 | End: 1900-01-01

## 2024-03-28 ENCOUNTER — APPOINTMENT (OUTPATIENT)
Dept: BARIATRICS | Facility: CLINIC | Age: 74
End: 2024-03-28

## 2024-03-28 ENCOUNTER — APPOINTMENT (OUTPATIENT)
Dept: PSYCHIATRY | Facility: CLINIC | Age: 74
End: 2024-03-28
Payer: MEDICARE

## 2024-03-28 PROCEDURE — 99215 OFFICE O/P EST HI 40 MIN: CPT | Mod: 95

## 2024-03-28 RX ORDER — VARENICLINE TARTRATE 0.5 (11)-1
0.5 MG X 11 & KIT ORAL
Qty: 1 | Refills: 1 | Status: ACTIVE | COMMUNITY
Start: 2023-11-27 | End: 1900-01-01

## 2024-03-28 NOTE — REVIEW OF SYSTEMS
[Negative] : Neurological [de-identified] : increased dysphoria as very behind in rent payments [de-identified] : wnl currently but with history of being hypercoagulable for a period of time for unclear reasons

## 2024-03-28 NOTE — PHYSICAL EXAM
[Cooperative] : cooperative [Constricted] : constricted [Depressed] : depressed [Clear] : clear [Linear/Goal Directed] : linear/goal directed [Individual reports tobacco use during the last 30 days?] : Individual reports tobacco use during the last 30 days? Yes [Cigarettes] : Cigarettes [Individual reports use of the following tobacco products during the last 30 days?] : Individual reports use of the following tobacco products during the last 30 days? Yes -  [Individual reports current use of tobacco cessation medication or nicotine replacement therapy?] : Individual reports current use of tobacco cessation medication or nicotine replacement therapy? Yes [Yes] : Yes [After 60 minutes (0)] : After 60 minutes (0) [Any other (0)] : Any other (0) [10 or less (0)] : 10 or less (0) [No (0)] : No (0) [4 - 4 or more times a week] : 4 - 4 or more times a week [1 - 3 or 4] : 1 - 3 or 4 [0 - Never] : 0 - Never [0 - No] : 0 - No [2] : 2 [4] : 4 [3] : 3 [SchwartzScore] : 24 [Average] : average [WNL] : within normal limits [de-identified] : adequate [de-identified] : fair to limited; (at times delays needed doctor visit, resumed smoking etc) [Does individual accept referral to MD for cessation medication or NRT?] : Does individual accept referral to MD for cessation medication or NRT? No [FreeTextEntry1] : 5

## 2024-03-28 NOTE — HISTORY OF PRESENT ILLNESS
[Not Applicable] : Not applicable [FreeTextEntry1] : Patient with chronic dysphoria and with some anxiety related to medical fears and going outside.   [FreeTextEntry2] : Patient reports first coming into psychiatric treatment to [facility] in the late 1980s, probably 1988 per pt.  (Records from [facility] show first appointment in 1996.) \par  \par  At the time, she stated she was crying a lot, unable to get up, laying in the dark and feeling very unsure of herself.  SHe additionally was afraid of being around crowds.  Patient stated that she was diagnosed with depression and started taking medications for this in the late 80s or early 90s and has been in treatment ever since, mostly psychopharm tx wit some attempts at therapy.   (Appears she started tx in 1996 however)\par  \par  Patient denied history of psych hospitalizations, suicidality, psychosis or javi though records indicate in 1996 she admitted to thinking about jumping out the window of her 13th story apartment that she shared with her mother.   [FreeTextEntry3] : Some include:  Paxil, Trazadone, Depakote, Lexapro, Lamictal (briefly started), Duloxetine, Gabapentin, Bupropion, lorazepam, Xanax, Ambien, Ramelteon

## 2024-03-28 NOTE — REASON FOR VISIT
[Telehealth (audio & video) - Individual/Group] : This visit was provided via telehealth using real-time 2-way audio visual technology. [Other Location: e.g. Home (Enter Location, City,State)___] : The provider was located at [unfilled]. [Home] : The patient, [unfilled], was located at home, [unfilled], at the time of the visit. [Verbal consent obtained from patient/other participant(s)] : Verbal consent for telehealth/telephonic services obtained from patient/other participant(s) [Patient] : Patient [FreeTextEntry4] : 1:35 pm [FreeTextEntry5] : 2:14 pm [FreeTextEntry2] : 1/12/24 [FreeTextEntry1] : follow up treatment of dysthymia, REGINE and insomnia

## 2024-03-28 NOTE — DISCUSSION/SUMMARY
[Date of Last Physical Exam: _____] : Date of Last Physical Exam: [unfilled] [Date of Last Annual Labs: _____] : Date of Last Annual Labs: [unfilled] [Tobacco Screening Completed?] : Tobacco Screening Completed: Yes [Annual Review of Systems Completed?] : Annual Review of Systems Completed: Yes [FreeTextEntry1] : Patient is a 73 year old, single black female and mother of adult daughter whom she has close but conflicted relations with.  Hali has struggled with depression, REGINE, insomnia and some agoraphobic tendencies.    She was hospitalized in late 2022/2023? with pulmonary embolism in setting of hypercoagulable work up; history of possible prior CVA and current sciatica. Relapsed with smoking in July 2023 when visiting family in [place] but has reduced significantly since recently on Chantix started 11/27/23.   Has been more depressed since end of 2023.

## 2024-03-28 NOTE — PLAN
[No] : No [Medication education provided] : Medication education provided. [FreeTextEntry4] : Patient gets out of the house at least a few days a week though still at times have periods of staying inside for many consecutive days with curtains closed; patient smoking 8-9 cigarettes a day and reviewed plan to further reduce [FreeTextEntry5] : Offered support/encouragement, psychoeducation and counseling regarding dx, meds, symptoms, etc.  Reviewed/discussed plan below:  May continue Gabapentin at 600 mg qhs prn May continue Trazodone 200 mg qhs for anxiety and insomnia respectively Continue Ramelteon for insomnia Have advised limited alcohol use and increased other fluid intake as well as continued medical follow up Have resume Wellbutrin  mg daily  To continue Chantix  and renewed today; (takes lower dosage as full dose makes her feel sick); to f/u re: amount of cigs smoking Have referred for 1:1 therapy which pt is interested in; (sent task to therapy team on 1/12/24; will f/u  -Offered support around financial woes and rent issues -Discussed option to refer for [facility] case management which pt open to for $ struggle assistance; referral submitted 3/28/24 -Discussed potential for writer to speak with [facility]anning re: other possible supports for pt; (pt on Medicaid with SSI and Social security and food stamps) and writer emailed Channing to inquire Contact writer prn  5/5/22: SOS-10: 52 minimally impaired 1/6/23: SOS-10: 42, minimally impaired 8/11/23: SOS-10: 24, moderately impaired  55 minutes spent reviewing prior records/notes, seeing patient and recording session note

## 2024-04-09 ENCOUNTER — APPOINTMENT (OUTPATIENT)
Dept: NEUROLOGY | Facility: CLINIC | Age: 74
End: 2024-04-09
Payer: MEDICARE

## 2024-04-09 VITALS
OXYGEN SATURATION: 97 % | DIASTOLIC BLOOD PRESSURE: 84 MMHG | WEIGHT: 163 LBS | HEIGHT: 69 IN | HEART RATE: 88 BPM | BODY MASS INDEX: 24.14 KG/M2 | SYSTOLIC BLOOD PRESSURE: 132 MMHG | TEMPERATURE: 97.7 F

## 2024-04-09 DIAGNOSIS — H81.4 VERTIGO OF CENTRAL ORIGIN: ICD-10-CM

## 2024-04-09 PROCEDURE — 99213 OFFICE O/P EST LOW 20 MIN: CPT

## 2024-04-09 NOTE — ASSESSMENT
[FreeTextEntry1] : Cerebellar stroke stable.  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  rec getting therapy at [facility] and [facility].  Follow up in 1.5 months.

## 2024-04-09 NOTE — PHYSICAL EXAM
[FreeTextEntry1] : The patient is alert and oriented x3, naming intact x3, repetition normal, follows three-step commands, and is able to participate fully in the history taking. Speech is normal with no evidence of dysarthria. Memory is intact: Immediate recall 3 out of 3, short-term 3 out of 3, remote memory intact Cranial nerves II through XII intact - gets vertiginous on looking to the right Motor exam: Upper and lower extremities 5 out of 5 power, normal tone. No abnormal movements noted. Sensory exam: Intact to light touch and pinprick. Romberg negative. Coordination and vestibular exam: Finger to nose dysmetria, no evidence of truncal or appendicular ataxia. No evidence of nystagmus. gets vertiginous each time she turns while testing gait. Mild dysmetria bilateral. Gait: Normal stance and gait. Narrow based Reflexes: One to 2+ in upper and lower extremities. No pathological reflexes. Downgoing toes.

## 2024-04-10 ENCOUNTER — NON-APPOINTMENT (OUTPATIENT)
Age: 74
End: 2024-04-10

## 2024-04-12 ENCOUNTER — NON-APPOINTMENT (OUTPATIENT)
Age: 74
End: 2024-04-12

## 2024-04-15 ENCOUNTER — NON-APPOINTMENT (OUTPATIENT)
Age: 74
End: 2024-04-15

## 2024-04-15 NOTE — DISCUSSION/SUMMARY
[FreeTextEntry1] : Writer received a call from [employer] about referral. Writer provided information re: pt's arrears and her income and pt has applied for one shot deal. [facility] will follow up with patient. Contact re: case was accepted or not is Ms. [email or web address].

## 2024-04-15 NOTE — DISCUSSION/SUMMARY
[FreeTextEntry1] : As discussed with patient, I referred her to [facility] for financial case management. Please note the web referral number of this referral: [id number]. Patient is in rent arrears in needs supports in managing budget to avoid this occurring again.

## 2024-04-17 ENCOUNTER — APPOINTMENT (OUTPATIENT)
Dept: PSYCHIATRY | Facility: CLINIC | Age: 74
End: 2024-04-17
Payer: MEDICARE

## 2024-04-17 PROCEDURE — 99443: CPT | Mod: 95

## 2024-04-17 NOTE — PLAN
[No] : No [Medication education provided] : Medication education provided. [FreeTextEntry4] : Patient gets out of the house at least a few days a week though still at times have periods of staying inside for many consecutive days with curtains closed; patient smoking 8-9 cigarettes a day and reviewed plan to further reduce [FreeTextEntry5] : Offered support/encouragement, psychoeducation and counseling regarding dx, meds, symptoms, etc.  Reviewed/discussed plan below:  May continue Gabapentin at 600 mg qhs prn May continue Trazodone 200 mg qhs for anxiety and insomnia respectively Continue Ramelteon for insomnia Have advised limited alcohol use and increased other fluid intake as well as continued medical follow up -Have resumed Wellbutrin  mg daily  Have referred for 1:1 therapy which pt is interested in; (sent task to therapy team on 1/12/24; will f/u  -Continued to offer support around financial woes and rent issues -Continue treatment with [facility] -Reduced smoking from 10 cigs a day to 3 a day with Chantix 0.25 BID and may increase Chantix to 0.5 mg dosage; (had not tolerated this dose in past) -Contact writer prn  5/5/22: SOS-10: 52 minimally impaired 1/6/23: SOS-10: 42, minimally impaired 8/11/23: SOS-10: 24, moderately impaired  30+ minutes spent reviewing prior records/notes, seeing patient and recording session note

## 2024-04-17 NOTE — DISCUSSION/SUMMARY
[Date of Last Physical Exam: _____] : Date of Last Physical Exam: [unfilled] [Date of Last Annual Labs: _____] : Date of Last Annual Labs: [unfilled] [Annual Review of Systems Completed?] : Annual Review of Systems Completed: Yes [Tobacco Screening Completed?] : Tobacco Screening Completed: Yes [FreeTextEntry1] : Patient is a 73 year old, single black female and mother of adult daughter whom she has close but conflicted relations with.  Hali has struggled with depression, REGINE, insomnia and some agoraphobic tendencies.    She was hospitalized in late 2022/2023? with pulmonary embolism in setting of hypercoagulable work up; history of possible prior CVA and current sciatica. Relapsed with smoking in July 2023 when visiting family in [place] but has reduced significantly since recently on Chantix started 11/27/23.    Had been more depressed since end of 2023 because of rent arrears but feeling more hopeful with increased support from therapist Channing.

## 2024-04-17 NOTE — HISTORY OF PRESENT ILLNESS
[Not Applicable] : Not applicable [FreeTextEntry1] : Patient with chronic dysphoria and with some anxiety related to medical fears and going outside.   [FreeTextEntry2] : Patient reports first coming into psychiatric treatment to [facility] in the late 1980s, probably 1988 per pt.  (Records from [facility] show first appointment in 1996.) \par  \par  At the time, she stated she was crying a lot, unable to get up, laying in the dark and feeling very unsure of herself.  SHe additionally was afraid of being around crowds.  Patient stated that she was diagnosed with depression and started taking medications for this in the late 80s or early 90s and has been in treatment ever since, mostly psychopharm tx wit some attempts at therapy.   (Appears she started tx in 1996 however)\par  \par  Patient denied history of psych hospitalizations, suicidality, psychosis or javi though records indicate in 1996 she admitted to thinking about jumping out the window of her 13th story apartment that she shared with her mother.   [FreeTextEntry3] : Some include:  Paxil, Trazadone, Depakote, Lexapro, Lamictal (briefly started), Duloxetine, Gabapentin, Bupropion, lorazepam, Xanax, Ambien, Ramelteon

## 2024-04-17 NOTE — REASON FOR VISIT
[Other Location: e.g. Home (Enter Location, City,State)___] : The provider was located at [unfilled]. [Home] : The patient, [unfilled], was located at home, [unfilled], at the time of the visit. [Verbal consent obtained from patient/other participant(s)] : Verbal consent for telehealth/telephonic services obtained from patient/other participant(s) [Patient] : Patient [Telephone (audio) - Individual/Group] : This telephonic visit was provided via audio only technology. [FreeTextEntry4] : 1:09 pm [FreeTextEntry5] : 1:37 pm [FreeTextEntry2] : 1/12/24 [FreeTextEntry1] : follow up treatment of dysthymia, REGINE and insomnia

## 2024-04-17 NOTE — PHYSICAL EXAM
[Average] : average [Cooperative] : cooperative [Clear] : clear [Linear/Goal Directed] : linear/goal directed [WNL] : within normal limits [Individual reports tobacco use during the last 30 days?] : Individual reports tobacco use during the last 30 days? Yes [Individual reports use of the following tobacco products during the last 30 days?] : Individual reports use of the following tobacco products during the last 30 days? Yes -  [Cigarettes] : Cigarettes [Individual reports current use of tobacco cessation medication or nicotine replacement therapy?] : Individual reports current use of tobacco cessation medication or nicotine replacement therapy? Yes [Yes] : Yes [After 60 minutes (0)] : After 60 minutes (0) [Any other (0)] : Any other (0) [10 or less (0)] : 10 or less (0) [No (0)] : No (0) [4 - 4 or more times a week] : 4 - 4 or more times a week [1 - 3 or 4] : 1 - 3 or 4 [0 - Never] : 0 - Never [0 - No] : 0 - No [FreeTextEntry8] : improved mood [de-identified] : telephone [de-identified] : adequate [de-identified] : fair to limited; (at times delays needed doctor visit, resumed smoking etc) [Does individual accept referral to MD for cessation medication or NRT?] : Does individual accept referral to MD for cessation medication or NRT? No [FreeTextEntry1] : 5

## 2024-04-17 NOTE — REVIEW OF SYSTEMS
[Negative] : Allergic/Immunologic [de-identified] : improved mood [de-identified] : wnl currently but with history of being hypercoagulable for a period of time for unclear reasons

## 2024-05-01 ENCOUNTER — OUTPATIENT (OUTPATIENT)
Dept: OUTPATIENT SERVICES | Facility: HOSPITAL | Age: 74
LOS: 1 days | Discharge: HOME | End: 2024-05-01

## 2024-05-01 DIAGNOSIS — Z98.890 OTHER SPECIFIED POSTPROCEDURAL STATES: Chronic | ICD-10-CM

## 2024-05-01 DIAGNOSIS — H57.9 UNSPECIFIED DISORDER OF EYE AND ADNEXA: Chronic | ICD-10-CM

## 2024-05-01 DIAGNOSIS — K42.9 UMBILICAL HERNIA WITHOUT OBSTRUCTION OR GANGRENE: Chronic | ICD-10-CM

## 2024-05-01 DIAGNOSIS — H72.92 UNSPECIFIED PERFORATION OF TYMPANIC MEMBRANE, LEFT EAR: Chronic | ICD-10-CM

## 2024-05-01 DIAGNOSIS — Z98.49 CATARACT EXTRACTION STATUS, UNSPECIFIED EYE: Chronic | ICD-10-CM

## 2024-05-01 DIAGNOSIS — M79.673 PAIN IN UNSPECIFIED FOOT: Chronic | ICD-10-CM

## 2024-05-02 ENCOUNTER — NON-APPOINTMENT (OUTPATIENT)
Age: 74
End: 2024-05-02

## 2024-05-17 ENCOUNTER — APPOINTMENT (OUTPATIENT)
Dept: PSYCHIATRY | Facility: CLINIC | Age: 74
End: 2024-05-17
Payer: MEDICARE

## 2024-05-17 DIAGNOSIS — F41.1 GENERALIZED ANXIETY DISORDER: ICD-10-CM

## 2024-05-17 DIAGNOSIS — F34.1 DYSTHYMIC DISORDER: ICD-10-CM

## 2024-05-17 DIAGNOSIS — G47.00 INSOMNIA, UNSPECIFIED: ICD-10-CM

## 2024-05-17 PROCEDURE — G2211 COMPLEX E/M VISIT ADD ON: CPT

## 2024-05-17 PROCEDURE — 90833 PSYTX W PT W E/M 30 MIN: CPT

## 2024-05-17 PROCEDURE — 99214 OFFICE O/P EST MOD 30 MIN: CPT

## 2024-05-17 RX ORDER — TRAZODONE HYDROCHLORIDE 100 MG/1
100 TABLET ORAL AT BEDTIME
Qty: 180 | Refills: 1 | Status: ACTIVE | COMMUNITY
Start: 2020-06-12 | End: 1900-01-01

## 2024-05-17 RX ORDER — BUPROPION HYDROCHLORIDE 300 MG/1
300 TABLET, EXTENDED RELEASE ORAL DAILY
Qty: 90 | Refills: 0 | Status: ACTIVE | COMMUNITY
Start: 2024-02-26 | End: 1900-01-01

## 2024-05-17 RX ORDER — GABAPENTIN 300 MG/1
300 CAPSULE ORAL TWICE DAILY
Qty: 180 | Refills: 1 | Status: ACTIVE | COMMUNITY
Start: 2021-02-08 | End: 1900-01-01

## 2024-05-17 RX ORDER — RAMELTEON 8 MG/1
8 TABLET ORAL AT BEDTIME
Qty: 90 | Refills: 1 | Status: ACTIVE | COMMUNITY
Start: 2021-04-23 | End: 1900-01-01

## 2024-05-17 RX ORDER — BUPROPION HYDROCHLORIDE 150 MG/1
150 TABLET, EXTENDED RELEASE ORAL DAILY
Qty: 90 | Refills: 1 | Status: ACTIVE | COMMUNITY
Start: 2023-01-06 | End: 1900-01-01

## 2024-05-17 NOTE — DISCUSSION/SUMMARY
[Date of Last Physical Exam: _____] : Date of Last Physical Exam: [unfilled] [Date of Last Annual Labs: _____] : Date of Last Annual Labs: [unfilled] [Annual Review of Systems Completed?] : Annual Review of Systems Completed: Yes [Tobacco Screening Completed?] : Tobacco Screening Completed: Yes [FreeTextEntry1] : Patient is a 73 year old, single black female and mother of adult daughter whom she has close but conflicted relations with.  Hali has struggled with depression, REGINE, insomnia and some agoraphobic tendencies.    She was hospitalized in late 2022/2023? with pulmonary embolism in setting of hypercoagulable work up; history of possible prior CVA and current sciatica. Relapsed with smoking in July 2023 when visiting family in [place] but has reduced significantly since recently on Chantix started 11/27/23.    Had been more depressed since end of 2023 because of rent arrears and after feeling hopeful about one shot deal, dysphoric again after having been turned down.

## 2024-05-17 NOTE — REASON FOR VISIT
[Telephone (audio) - Individual/Group] : This telephonic visit was provided via audio only technology. [Other Location: e.g. Home (Enter Location, City,State)___] : The provider was located at [unfilled]. [Home] : The patient, [unfilled], was located at home, [unfilled], at the time of the visit. [Verbal consent obtained from patient/other participant(s)] : Verbal consent for telehealth/telephonic services obtained from patient/other participant(s) [Patient] : Patient [FreeTextEntry4] : 1:09 pm [FreeTextEntry5] : 1:37 pm [FreeTextEntry2] : 1/12/24 [FreeTextEntry1] : follow up treatment of dysthymia, REGINE and insomnia

## 2024-05-17 NOTE — PLAN
[No] : No [Medication education provided] : Medication education provided. [FreeTextEntry4] : Patient gets out of the house at least a few days a week though still at times have periods of staying inside for many consecutive days with curtains closed; patient smoking 8-9 cigarettes a day and reviewed plan to further reduce [FreeTextEntry5] : Offered support/encouragement, psychoeducation and counseling regarding dx, meds, symptoms, etc.  Reviewed/discussed plan below:  -May continue Gabapentin at 600 mg qhs prn and put in for 3 months of this and all meds because pt going away next month -May continue Trazodone 200 mg qhs for anxiety and insomnia respectively -Continue Ramelteon for insomnia -Advised pt be mindful re: alcohol use and to continue to explore her use -Have resumed Wellbutrin XL but pt uncertain how much she is taking so to increase by 150 mg each session with writer to 450 mg max -Pt to f/u with Channing to resume therapy but wants to wait until returns from 3 week trip to see family in [place] -Continued to offer support around financial woes and rent issues -Continue to take Chantix -Reviewed R/B/SE of meclizine and pt may try for vertigo but to stop 1 week prior to PT to see if PT helpful  -Contact writer prn  5/5/22: SOS-10: 52 minimally impaired 1/6/23: SOS-10: 42, minimally impaired 8/11/23: SOS-10: 24, moderately impaired  40 minutes spent reviewing prior records/notes, seeing patient and recording session note

## 2024-05-17 NOTE — REVIEW OF SYSTEMS
[Negative] : Allergic/Immunologic [de-identified] : depressed [de-identified] : wnl currently but with history of being hypercoagulable for a period of time for unclear reasons

## 2024-05-17 NOTE — PHYSICAL EXAM
[Average] : average [Cooperative] : cooperative [Clear] : clear [Linear/Goal Directed] : linear/goal directed [Individual reports tobacco use during the last 30 days?] : Individual reports tobacco use during the last 30 days? Yes [Individual reports use of the following tobacco products during the last 30 days?] : Individual reports use of the following tobacco products during the last 30 days? Yes -  [Cigarettes] : Cigarettes [Individual reports current use of tobacco cessation medication or nicotine replacement therapy?] : Individual reports current use of tobacco cessation medication or nicotine replacement therapy? Yes [Yes] : Yes [After 60 minutes (0)] : After 60 minutes (0) [Any other (0)] : Any other (0) [10 or less (0)] : 10 or less (0) [No (0)] : No (0) [4 - 4 or more times a week] : 4 - 4 or more times a week [1 - 3 or 4] : 1 - 3 or 4 [0 - Never] : 0 - Never [0 - No] : 0 - No [de-identified] : telephone [WNL] : within normal limits [Constricted] : constricted [FreeTextEntry8] : improved mood [de-identified] : adequate [de-identified] : fair to limited; (at times delays needed doctor visit, resumed smoking etc) [Does individual accept referral to MD for cessation medication or NRT?] : Does individual accept referral to MD for cessation medication or NRT? No [FreeTextEntry1] : 5

## 2024-05-17 NOTE — HISTORY OF PRESENT ILLNESS
[Not Applicable] : Not applicable [FreeTextEntry1] : Patient with chronic dysphoria and with some anxiety related to medical fears and going outside.   [FreeTextEntry2] : Patient reports first coming into psychiatric treatment to [facility] in the late 1980s, probably 1988 per pt.  (Records from [facility] show first appointment in 1996.) \par  \par  At the time, she stated she was crying a lot, unable to get up, laying in the dark and feeling very unsure of herself.  SHe additionally was afraid of being around crowds.  Patient stated that she was diagnosed with depression and started taking medications for this in the late 80s or early 90s and has been in treatment ever since, mostly psychopharm tx wit some attempts at therapy.   (Appears she started tx in 1996 however)\par  \par  Patient denied history of psych hospitalizations, suicidality, psychosis or javi though records indicate in 1996 she admitted to thinking about jumping out the window of her 13th story apartment that she shared with her mother.   [FreeTextEntry3] : Some include:  Paxil, Trazadone, Depakote, Lexapro, Lamictal (briefly started), Duloxetine, Gabapentin, Bupropion, lorazepam, Xanax, Ambien, Ramelteon

## 2024-05-23 ENCOUNTER — APPOINTMENT (OUTPATIENT)
Dept: GASTROENTEROLOGY | Facility: CLINIC | Age: 74
End: 2024-05-23
Payer: MEDICARE

## 2024-05-23 VITALS
BODY MASS INDEX: 24.14 KG/M2 | HEIGHT: 69 IN | OXYGEN SATURATION: 96 % | SYSTOLIC BLOOD PRESSURE: 138 MMHG | TEMPERATURE: 96.7 F | HEART RATE: 91 BPM | WEIGHT: 163 LBS | DIASTOLIC BLOOD PRESSURE: 80 MMHG | RESPIRATION RATE: 16 BRPM

## 2024-05-23 DIAGNOSIS — R68.81 EARLY SATIETY: ICD-10-CM

## 2024-05-23 PROCEDURE — 99215 OFFICE O/P EST HI 40 MIN: CPT

## 2024-05-23 RX ORDER — OMEPRAZOLE 40 MG/1
40 CAPSULE, DELAYED RELEASE ORAL
Qty: 90 | Refills: 0 | Status: ACTIVE | COMMUNITY
Start: 2024-05-23 | End: 1900-01-01

## 2024-05-23 NOTE — ASSESSMENT
[FreeTextEntry1] : Impression: #Early satiety #Hx gastric ulcers - must r/o malignancy.   Reassuring that her sx tend to improve when she's not eating by herself.   Plan: - Restart PPI - NMGES - Schedule upper endoscopy for f/u gastric ulcers at [facility].  - Risks (including anesthesia complications, bleeding, infection, perforation) as well as benefits, alternatives, and details of both procedures discussed w/ patient. - Written instructions provided.  - Need for chaperone discussed.

## 2024-06-05 ENCOUNTER — NON-APPOINTMENT (OUTPATIENT)
Age: 74
End: 2024-06-05

## 2024-06-07 DIAGNOSIS — F41.1 GENERALIZED ANXIETY DISORDER: ICD-10-CM

## 2024-06-07 DIAGNOSIS — F41.9 ANXIETY DISORDER, UNSPECIFIED: ICD-10-CM

## 2024-06-07 DIAGNOSIS — F34.1 DYSTHYMIC DISORDER: ICD-10-CM

## 2024-06-14 ENCOUNTER — APPOINTMENT (OUTPATIENT)
Dept: PSYCHIATRY | Facility: CLINIC | Age: 74
End: 2024-06-14

## 2024-06-19 ENCOUNTER — OUTPATIENT (OUTPATIENT)
Dept: OUTPATIENT SERVICES | Facility: HOSPITAL | Age: 74
LOS: 1 days | End: 2024-06-19
Payer: MEDICARE

## 2024-06-19 ENCOUNTER — APPOINTMENT (OUTPATIENT)
Dept: NUCLEAR MEDICINE | Facility: HOSPITAL | Age: 74
End: 2024-06-19

## 2024-06-19 DIAGNOSIS — H72.92 UNSPECIFIED PERFORATION OF TYMPANIC MEMBRANE, LEFT EAR: Chronic | ICD-10-CM

## 2024-06-19 DIAGNOSIS — K42.9 UMBILICAL HERNIA WITHOUT OBSTRUCTION OR GANGRENE: Chronic | ICD-10-CM

## 2024-06-19 DIAGNOSIS — Z98.890 OTHER SPECIFIED POSTPROCEDURAL STATES: Chronic | ICD-10-CM

## 2024-06-19 DIAGNOSIS — M79.673 PAIN IN UNSPECIFIED FOOT: Chronic | ICD-10-CM

## 2024-06-19 DIAGNOSIS — Z98.49 CATARACT EXTRACTION STATUS, UNSPECIFIED EYE: Chronic | ICD-10-CM

## 2024-06-19 DIAGNOSIS — H57.9 UNSPECIFIED DISORDER OF EYE AND ADNEXA: Chronic | ICD-10-CM

## 2024-06-19 PROCEDURE — 78264 GASTRIC EMPTYING IMG STUDY: CPT

## 2024-06-19 PROCEDURE — 78264 GASTRIC EMPTYING IMG STUDY: CPT | Mod: 26,MH

## 2024-06-19 PROCEDURE — A9541: CPT

## 2024-06-20 ENCOUNTER — APPOINTMENT (OUTPATIENT)
Dept: ULTRASOUND IMAGING | Facility: CLINIC | Age: 74
End: 2024-06-20
Payer: MEDICARE

## 2024-06-20 ENCOUNTER — NON-APPOINTMENT (OUTPATIENT)
Age: 74
End: 2024-06-20

## 2024-06-20 PROCEDURE — 76641 ULTRASOUND BREAST COMPLETE: CPT | Mod: RT,3G

## 2024-06-25 ENCOUNTER — APPOINTMENT (OUTPATIENT)
Dept: NEUROLOGY | Facility: CLINIC | Age: 74
End: 2024-06-25

## 2024-06-25 VITALS
DIASTOLIC BLOOD PRESSURE: 72 MMHG | BODY MASS INDEX: 23.99 KG/M2 | SYSTOLIC BLOOD PRESSURE: 106 MMHG | WEIGHT: 162 LBS | TEMPERATURE: 96.3 F | HEART RATE: 99 BPM | OXYGEN SATURATION: 100 % | HEIGHT: 69 IN

## 2024-06-25 DIAGNOSIS — R26.81 UNSTEADINESS ON FEET: ICD-10-CM

## 2024-06-25 DIAGNOSIS — I63.9 CEREBRAL INFARCTION, UNSPECIFIED: ICD-10-CM

## 2024-06-25 PROCEDURE — 99214 OFFICE O/P EST MOD 30 MIN: CPT

## 2024-06-25 RX ORDER — MECLIZINE HYDROCHLORIDE 12.5 MG/1
12.5 TABLET ORAL
Qty: 45 | Refills: 0 | Status: ACTIVE | COMMUNITY
Start: 2024-05-17 | End: 1900-01-01

## 2024-06-25 RX ORDER — MECLIZINE HYDROCHLORIDE 25 MG/1
25 TABLET ORAL TWICE DAILY
Qty: 30 | Refills: 3 | Status: ACTIVE | COMMUNITY
Start: 2024-06-25 | End: 1900-01-01

## 2024-06-26 ENCOUNTER — APPOINTMENT (OUTPATIENT)
Dept: INTERNAL MEDICINE | Facility: CLINIC | Age: 74
End: 2024-06-26

## 2024-07-25 DIAGNOSIS — F17.210 NICOTINE DEPENDENCE, CIGARETTES, UNCOMPLICATED: ICD-10-CM

## 2024-07-25 RX ORDER — VARENICLINE TARTRATE 0.5 (11)-1
0.5 MG X 11 & KIT ORAL
Qty: 1 | Refills: 0 | Status: ACTIVE | COMMUNITY
Start: 2024-07-25 | End: 1900-01-01

## 2024-07-26 ENCOUNTER — NON-APPOINTMENT (OUTPATIENT)
Age: 74
End: 2024-07-26

## 2024-08-01 NOTE — ASU PREOP CHECKLIST - HEART RATE (BEATS/MIN)
Tc to the pt to let him know his MRI was normal. Int # [id number]. The pt verified his name and . The pt was told that I realized that he came in yesterday for an appt with the Dr and it was canceled, because he had insurance. The pt stated he does not think he has coverage because he has to call his Insurance and get the member ID # in order to use his insurance. The pt was told I was just calling to make sure he got his message about his MRI results. The pt stated he had gotten the MRI results through the email. I informed him that yes the MRI results were normal. The pt is aware. I informed the pt that he will need to call the insurance Co and have them send him a card and ask them who is in his network. If he needs assistance with finding a new pcp we can assist him with this. Sofie Han RN    
[phone]

## 2024-08-08 ENCOUNTER — APPOINTMENT (OUTPATIENT)
Dept: PSYCHIATRY | Facility: CLINIC | Age: 74
End: 2024-08-08

## 2024-08-08 PROCEDURE — 99443: CPT | Mod: 95

## 2024-08-08 PROCEDURE — G2211 COMPLEX E/M VISIT ADD ON: CPT | Mod: NC,95

## 2024-08-08 NOTE — PHYSICAL EXAM
[Average] : average [Cooperative] : cooperative [Constricted] : constricted [Clear] : clear [Linear/Goal Directed] : linear/goal directed [WNL] : within normal limits [Individual reports tobacco use during the last 30 days?] : Individual reports tobacco use during the last 30 days? Yes [Individual reports use of the following tobacco products during the last 30 days?] : Individual reports use of the following tobacco products during the last 30 days? Yes -  [Cigarettes] : Cigarettes [Individual reports current use of tobacco cessation medication or nicotine replacement therapy?] : Individual reports current use of tobacco cessation medication or nicotine replacement therapy? Yes [Yes] : Yes [After 60 minutes (0)] : After 60 minutes (0) [Any other (0)] : Any other (0) [10 or less (0)] : 10 or less (0) [No (0)] : No (0) [4 - 4 or more times a week] : 4 - 4 or more times a week [1 - 3 or 4] : 1 - 3 or 4 [0 - Never] : 0 - Never [0 - No] : 0 - No [FreeTextEntry8] : improved mood [Anxious] : anxious [de-identified] : adequate [de-identified] : fair to limited; (at times delays needed doctor visit, resumed smoking etc) [Does individual accept referral to MD for cessation medication or NRT?] : Does individual accept referral to MD for cessation medication or NRT? No [FreeTextEntry1] : 5

## 2024-08-08 NOTE — PHYSICAL EXAM
[Average] : average [Cooperative] : cooperative [Constricted] : constricted [Clear] : clear [Linear/Goal Directed] : linear/goal directed [WNL] : within normal limits [Individual reports tobacco use during the last 30 days?] : Individual reports tobacco use during the last 30 days? Yes [Individual reports use of the following tobacco products during the last 30 days?] : Individual reports use of the following tobacco products during the last 30 days? Yes -  [Cigarettes] : Cigarettes [Individual reports current use of tobacco cessation medication or nicotine replacement therapy?] : Individual reports current use of tobacco cessation medication or nicotine replacement therapy? Yes [Yes] : Yes [After 60 minutes (0)] : After 60 minutes (0) [Any other (0)] : Any other (0) [10 or less (0)] : 10 or less (0) [No (0)] : No (0) [4 - 4 or more times a week] : 4 - 4 or more times a week [1 - 3 or 4] : 1 - 3 or 4 [0 - Never] : 0 - Never [0 - No] : 0 - No [FreeTextEntry8] : improved mood [Anxious] : anxious [de-identified] : adequate [de-identified] : fair to limited; (at times delays needed doctor visit, resumed smoking etc) [Does individual accept referral to MD for cessation medication or NRT?] : Does individual accept referral to MD for cessation medication or NRT? No [FreeTextEntry1] : 5

## 2024-08-08 NOTE — PLAN
[No] : No [Medication education provided] : Medication education provided. [FreeTextEntry4] : Patient gets out of the house at least a few days a week though still at times have periods of staying inside for many consecutive days with curtains closed; patient smoking 8-9 cigarettes a day and reviewed plan to further reduce [FreeTextEntry5] : Offered support/encouragement, psychoeducation and counseling regarding dx, meds, symptoms, etc.  Reviewed/discussed plan below:  -May continue Gabapentin at 600 mg qhs; takes nightly -May continue Trazodone 200 mg qhs for anxiety and insomnia respectively -Continue Ramelteon for insomnia -Review alcohol use periodically -Continue Wellbutrin  mg daily  -Writer clarifying what happened with pt's therapy assignment as initial assignment from Feb 2024 (John Mckinley) would only have been available until April 2024 so was decided not to continue with this assignment; (pt had believed that [facility] had taken over her case though stated she's been unable to connect with her though [facility]anning let writer know that pt had been ambivalent and declined follow up upon their last meeting -Continued to offer support around financial woes and stressors -Continue to take Chantix; pt stated a pack now lasting about a week rather than 2-3 days -F/u re: vertigo and treatment  -SOS-10 next session -Contact writer prn  5/5/22: SOS-10: 52 minimally impaired 1/6/23: SOS-10: 44, minimally impaired 8/11/23: SOS-10: 26, moderately impaired  30 minutes spent reviewing prior records/notes, seeing patient and recording session note

## 2024-08-08 NOTE — HISTORY OF PRESENT ILLNESS
[Not Applicable] : Not applicable [FreeTextEntry1] : Patient with chronic dysphoria and with some anxiety related to medical fears and going outside.   [FreeTextEntry2] : Patient reports first coming into psychiatric treatment to [facility] in the late 1980s, probably 1988 per pt.  (Records from [facility] show first appointment in 1996.) \par  \par  At the time, she stated she was crying a lot, unable to get up, laying in the dark and feeling very unsure of herself.  SHe additionally was afraid of being around crowds.  Patient stated that she was diagnosed with depression and started taking medications for this in the late 80s or early 90s and has been in treatment ever since, mostly psychopharm tx wit some attempts at therapy.   (Appears she started tx in 1996 however)\par  \par  Patient denied history of psych hospitalizations, suicidality, psychosis or javi though records indicate in 1996 she admitted to thinking about jumping out the window of her 13th story apartment that she shared with her mother.   [FreeTextEntry3] : Some include:  Paxil, Trazadone, Depakote, Lexapro, Lamictal (briefly started), Duloxetine, Gabapentin, Bupropion, lorazepam, Xanax, Ambien, Ramelteon

## 2024-08-08 NOTE — REASON FOR VISIT
[Patient] : Patient [Telehealth (audio & video) - Individual/Group] : This visit was provided via telehealth using real-time 2-way audio visual technology. [Other Location: e.g. Home (Enter Location, City,State)___] : The provider was located at [unfilled]. [Home] : The patient, [unfilled], was located at home, [unfilled], at the time of the visit. [Telephone (audio) - Individual/Group] : This telephonic visit was provided via audio only technology. [Verbal consent obtained from patient/other participant(s)] : Verbal consent for telehealth/telephonic services obtained from patient/other participant(s) [FreeTextEntry2] : 5/17/24 [FreeTextEntry1] : follow up treatment of dysthymia, REGINE and insomnia

## 2024-08-08 NOTE — REVIEW OF SYSTEMS
[Negative] : Allergic/Immunologic [de-identified] : depressed [de-identified] : wnl currently but with history of being hypercoagulable for a period of time for unclear reasons

## 2024-08-08 NOTE — REVIEW OF SYSTEMS
[Negative] : Allergic/Immunologic [de-identified] : depressed [de-identified] : wnl currently but with history of being hypercoagulable for a period of time for unclear reasons

## 2024-08-08 NOTE — DISCUSSION/SUMMARY
[Date of Last Physical Exam: _____] : Date of Last Physical Exam: [unfilled] [Date of Last Annual Labs: _____] : Date of Last Annual Labs: [unfilled] [Annual Review of Systems Completed?] : Annual Review of Systems Completed: Yes [Tobacco Screening Completed?] : Tobacco Screening Completed: Yes [FreeTextEntry1] : Patient is a 73 year old, single black female and mother of adult daughter whom she has close but conflicted relations with.  Hali has struggled with depression, REGINE, insomnia and some agoraphobic tendencies.    She was hospitalized in late 2022/2023? with pulmonary embolism in setting of hypercoagulable work up; history of possible prior CVA and current sciatica. Relapsed with smoking in July 2023 when visiting family in [place] but has reduced significantly since recently on Chantix started 11/27/23.    Had been more depressed since end of 2023 because of rent arrears; seems resolved currently but anxious over granddaughter's issues with BF and his violent behavior around pt's home.

## 2024-08-08 NOTE — REVIEW OF SYSTEMS
[Negative] : Allergic/Immunologic [de-identified] : depressed [de-identified] : wnl currently but with history of being hypercoagulable for a period of time for unclear reasons

## 2024-08-08 NOTE — HISTORY OF PRESENT ILLNESS
[Not Applicable] : Not applicable [FreeTextEntry1] : Patient with chronic dysphoria and with some anxiety related to medical fears and going outside.   [FreeTextEntry2] : Patient reports first coming into psychiatric treatment to [facility] in the late 1980s, probably 1988 per pt.  (Records from [facility] show first appointment in 1996.) \par  \par  At the time, she stated she was crying a lot, unable to get up, laying in the dark and feeling very unsure of herself.  SHe additionally was afraid of being around crowds.  Patient stated that she was diagnosed with depression and started taking medications for this in the late 80s or early 90s and has been in treatment ever since, mostly psychopharm tx wit some attempts at therapy.   (Appears she started tx in 1996 however)\par  \par  Patient denied history of psych hospitalizations, suicidality, psychosis or javi though records indicate in 1996 she admitted to thinking about jumping out the window of her 13th story apartment that she shared with her mother.   [FreeTextEntry3] : Some include:  Paxil, Trazadone, Depakote, Lexapro, Lamictal (briefly started), Duloxetine, Gabapentin, Bupropion, lorazepam, Xanax, Ambien, Ramelteon

## 2024-08-08 NOTE — REASON FOR VISIT
[Patient] : Patient [Telehealth (audio & video) - Individual/Group] : This visit was provided via telehealth using real-time 2-way audio visual technology. [Home] : The patient, [unfilled], was located at home, [unfilled], at the time of the visit. [Other Location: e.g. Home (Enter Location, City,State)___] : The provider was located at [unfilled]. [Telephone (audio) - Individual/Group] : This telephonic visit was provided via audio only technology. [Verbal consent obtained from patient/other participant(s)] : Verbal consent for telehealth/telephonic services obtained from patient/other participant(s) [FreeTextEntry2] : 5/17/24 [FreeTextEntry1] : follow up treatment of dysthymia, REGINE and insomnia

## 2024-08-08 NOTE — HISTORY OF PRESENT ILLNESS
[Not Applicable] : Not applicable [FreeTextEntry1] : Patient with chronic dysphoria and with some anxiety related to medical fears and going outside.   [FreeTextEntry2] : Patient reports first coming into psychiatric treatment to [facility] in the late 1980s, probably 1988 per pt.  (Records from [facility] show first appointment in 1996.) \par  \par  At the time, she stated she was crying a lot, unable to get up, laying in the dark and feeling very unsure of herself.  SHe additionally was afraid of being around crowds.  Patient stated that she was diagnosed with depression and started taking medications for this in the late 80s or early 90s and has been in treatment ever since, mostly psychopharm tx wit some attempts at therapy.   (Appears she started tx in 1996 however)\par  \par  Patient denied history of psych hospitalizations, suicidality, psychosis or javi though records indicate in 1996 she admitted to thinking about jumping out the window of her 13th story apartment that she shared with her mother.   [FreeTextEntry3] : Some include:  Paxil, Trazadone, Depakote, Lexapro, Lamictal (briefly started), Duloxetine, Gabapentin, Bupropion, lorazepam, Xanax, Ambien, Ramelteon

## 2024-08-08 NOTE — PHYSICAL EXAM
[Average] : average [Cooperative] : cooperative [Constricted] : constricted [Clear] : clear [Linear/Goal Directed] : linear/goal directed [WNL] : within normal limits [Individual reports tobacco use during the last 30 days?] : Individual reports tobacco use during the last 30 days? Yes [Individual reports use of the following tobacco products during the last 30 days?] : Individual reports use of the following tobacco products during the last 30 days? Yes -  [Cigarettes] : Cigarettes [Individual reports current use of tobacco cessation medication or nicotine replacement therapy?] : Individual reports current use of tobacco cessation medication or nicotine replacement therapy? Yes [Yes] : Yes [After 60 minutes (0)] : After 60 minutes (0) [Any other (0)] : Any other (0) [10 or less (0)] : 10 or less (0) [No (0)] : No (0) [4 - 4 or more times a week] : 4 - 4 or more times a week [1 - 3 or 4] : 1 - 3 or 4 [0 - Never] : 0 - Never [0 - No] : 0 - No [FreeTextEntry8] : improved mood [Anxious] : anxious [de-identified] : adequate [de-identified] : fair to limited; (at times delays needed doctor visit, resumed smoking etc) [Does individual accept referral to MD for cessation medication or NRT?] : Does individual accept referral to MD for cessation medication or NRT? No [FreeTextEntry1] : 5

## 2024-08-09 ENCOUNTER — OUTPATIENT (OUTPATIENT)
Dept: OUTPATIENT SERVICES | Facility: HOSPITAL | Age: 74
LOS: 1 days | Discharge: HOME | End: 2024-08-09
Payer: MEDICARE

## 2024-08-09 ENCOUNTER — APPOINTMENT (OUTPATIENT)
Dept: GASTROENTEROLOGY | Facility: HOSPITAL | Age: 74
End: 2024-08-09

## 2024-08-09 VITALS
RESPIRATION RATE: 20 BRPM | SYSTOLIC BLOOD PRESSURE: 115 MMHG | HEART RATE: 85 BPM | DIASTOLIC BLOOD PRESSURE: 75 MMHG | HEIGHT: 69 IN | TEMPERATURE: 99 F | OXYGEN SATURATION: 97 % | WEIGHT: 162.92 LBS

## 2024-08-09 DIAGNOSIS — H57.9 UNSPECIFIED DISORDER OF EYE AND ADNEXA: Chronic | ICD-10-CM

## 2024-08-09 DIAGNOSIS — Z98.890 OTHER SPECIFIED POSTPROCEDURAL STATES: Chronic | ICD-10-CM

## 2024-08-09 DIAGNOSIS — H72.92 UNSPECIFIED PERFORATION OF TYMPANIC MEMBRANE, LEFT EAR: Chronic | ICD-10-CM

## 2024-08-09 DIAGNOSIS — K42.9 UMBILICAL HERNIA WITHOUT OBSTRUCTION OR GANGRENE: Chronic | ICD-10-CM

## 2024-08-09 DIAGNOSIS — M79.673 PAIN IN UNSPECIFIED FOOT: Chronic | ICD-10-CM

## 2024-08-09 DIAGNOSIS — Z98.49 CATARACT EXTRACTION STATUS, UNSPECIFIED EYE: Chronic | ICD-10-CM

## 2024-08-09 PROBLEM — K59.09 CHRONIC CONSTIPATION: Status: ACTIVE | Noted: 2024-08-09

## 2024-08-09 PROCEDURE — 43235 EGD DIAGNOSTIC BRUSH WASH: CPT | Mod: 52

## 2024-08-09 PROCEDURE — 43235 EGD DIAGNOSTIC BRUSH WASH: CPT

## 2024-09-13 ENCOUNTER — APPOINTMENT (OUTPATIENT)
Dept: PSYCHIATRY | Facility: CLINIC | Age: 74
End: 2024-09-13
Payer: MEDICARE

## 2024-09-13 DIAGNOSIS — F41.1 GENERALIZED ANXIETY DISORDER: ICD-10-CM

## 2024-09-13 DIAGNOSIS — F34.1 DYSTHYMIC DISORDER: ICD-10-CM

## 2024-09-13 DIAGNOSIS — G47.00 INSOMNIA, UNSPECIFIED: ICD-10-CM

## 2024-09-13 PROCEDURE — 99215 OFFICE O/P EST HI 40 MIN: CPT

## 2024-09-13 PROCEDURE — G2211 COMPLEX E/M VISIT ADD ON: CPT

## 2024-09-13 NOTE — RISK ASSESSMENT
"Subjective:       Patient ID: William D Behler is a 29 y.o. male.    Chief Complaint: Establish Care    Here today to establish care with a new PCP.   Patient here today for annual well adult exam.  No complaints.  Tries to eat a healthy diet.  Exercises regularly.    Immunizations.  Tdap ?  Last Lab Work: 2022  Colon Ca screening: Due at 47  Prostate Ca Screening: Due at 52    He has been dealing with postnasal drip for 6 months plus.       Review of Systems   Constitutional:  Negative for appetite change, fatigue and fever.   HENT:  Positive for congestion. Negative for sneezing and sore throat.    Respiratory:  Negative for cough, shortness of breath and wheezing.    Cardiovascular:  Negative for chest pain and palpitations.   Gastrointestinal:  Positive for rectal pain. Negative for abdominal pain, constipation, diarrhea, nausea and vomiting.   Genitourinary:  Negative for difficulty urinating, dysuria, frequency and hematuria.   Neurological:  Negative for dizziness, syncope, weakness and headaches.   Psychiatric/Behavioral:  Negative for agitation, behavioral problems and confusion. The patient is not nervous/anxious.      Objective:      Vitals:    09/01/22 1307   BP: 112/68   Pulse: 68   SpO2: 98%   Weight: 59 kg (130 lb 1.1 oz)   Height: 5' 9" (1.753 m)      Physical Exam  Constitutional:       General: He is not in acute distress.  Cardiovascular:      Rate and Rhythm: Normal rate and regular rhythm.      Heart sounds: Normal heart sounds. No murmur heard.  Pulmonary:      Effort: Pulmonary effort is normal. No respiratory distress.      Breath sounds: Normal breath sounds. No wheezing, rhonchi or rales.   Musculoskeletal:         General: No swelling.   Skin:     General: Skin is warm and dry.   Neurological:      General: No focal deficit present.      Mental Status: He is alert.   Psychiatric:         Mood and Affect: Mood normal.         Behavior: Behavior normal.         Thought Content: Thought " content normal.       Results for orders placed or performed in visit on 06/16/22   POCT COVID-19 Rapid Screening   Result Value Ref Range    POC Rapid COVID Negative Negative     Acceptable Yes       Assessment:       1. Well adult exam    2. Allergic rhinitis with postnasal drip    3. Anal pain    4. Need for Tdap vaccination          Plan:       Well adult exam  Continue to work on dietary improvements (decrease overall calorie intake, decrease sugar and carb intake, decrease animal protein intake)  Continue to exercise at least 30-40 minutes, 3 times per week  Immunizations were discussed and Tdap today  Preventative exams were discussed and up to date   Allergic rhinitis with postnasal drip  -     Ambulatory referral/consult to Allergy; Future; Expected date: 09/08/2022    Anal pain  -     Ambulatory referral/consult to Colorectal Surgery; Future; Expected date: 09/08/2022    Need for Tdap vaccination  -     (In Office Administered) Tdap Vaccine        Medication List with Changes/Refills   Current Medications    AZELASTINE (ASTELIN) 137 MCG (0.1 %) NASAL SPRAY    2 sprays (274 mcg total) by Nasal route 2 (two) times daily as needed for Rhinitis.    FLUTICASONE PROPIONATE (FLONASE) 50 MCG/ACTUATION NASAL SPRAY    2 sprays (100 mcg total) by Each Nostril route once daily.    LORATADINE (CLARITIN) 10 MG TABLET    Take 1 tablet (10 mg total) by mouth once daily.    MULTIVITAMIN CAPSULE    Take 2 capsules by mouth once daily.   Discontinued Medications    METHYLPREDNISOLONE (MEDROL DOSEPACK) 4 MG TABLET    TAKE AS DIRECTED ON PACKAGE    MUPIROCIN (BACTROBAN) 2 % OINTMENT    Apply topically 3 (three) times daily.         [No, patient denies ideation or behavior] : No, patient denies ideation or behavior [Low acute suicide risk] : Low acute suicide risk [No] : No [Not clinically indicated] : Safety Plan completed/updated (for individuals at risk): Not clinically indicated

## 2024-09-14 NOTE — HISTORY OF PRESENT ILLNESS
[Not Applicable] : Not applicable [FreeTextEntry1] : Patient with chronic dysphoria and with some anxiety related to medical fears and going outside.   [FreeTextEntry2] : Patient reports first coming into psychiatric treatment to [facility] in the late 1980s, probably 1988 per pt.  (Records from [facility] show first appointment in 1996.) \par  \par  At the time, she stated she was crying a lot, unable to get up, laying in the dark and feeling very unsure of herself.  SHe additionally was afraid of being around crowds.  Patient stated that she was diagnosed with depression and started taking medications for this in the late 80s or early 90s and has been in treatment ever since, mostly psychopharm tx wit some attempts at therapy.   (Appears she started tx in 1996 however)\par  \par  Patient denied history of psych hospitalizations, suicidality, psychosis or javi though records indicate in 1996 she admitted to thinking about jumping out the window of her 13th story apartment that she shared with her mother.   [FreeTextEntry3] : Some include:  Paxil, Trazadone, Depakote, Lexapro, Lamictal (briefly started), Duloxetine, Gabapentin, Bupropion, lorazepam, Xanax, Ambien, Ramelteon

## 2024-09-14 NOTE — PLAN
[No] : No [Medication education provided] : Medication education provided. [FreeTextEntry4] : Patient gets out of the house at least a few days a week though still at times have periods of staying inside for many consecutive days with curtains closed; patient smoking 8-9 cigarettes a day and reviewed plan to further reduce [FreeTextEntry5] : Offered support/encouragement, psychoeducation and counseling regarding dx, meds, symptoms, etc.  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

## 2024-09-14 NOTE — REASON FOR VISIT
[Patient] : Patient [FreeTextEntry1] : treatment of dysthymia, REGINE, nicotine dependence and insomnia

## 2024-09-14 NOTE — PHYSICAL EXAM
[Average] : average [Cooperative] : cooperative [Anxious] : anxious [Constricted] : constricted [Clear] : clear [Linear/Goal Directed] : linear/goal directed [WNL] : within normal limits [Individual reports tobacco use during the last 30 days?] : Individual reports tobacco use during the last 30 days? Yes [Individual reports use of the following tobacco products during the last 30 days?] : Individual reports use of the following tobacco products during the last 30 days? Yes -  [Cigarettes] : Cigarettes [Individual reports current use of tobacco cessation medication or nicotine replacement therapy?] : Individual reports current use of tobacco cessation medication or nicotine replacement therapy? Yes [Yes] : Yes [After 60 minutes (0)] : After 60 minutes (0) [Any other (0)] : Any other (0) [10 or less (0)] : 10 or less (0) [No (0)] : No (0) [4 - 4 or more times a week] : 4 - 4 or more times a week [1 - 3 or 4] : 1 - 3 or 4 [0 - Never] : 0 - Never [0 - No] : 0 - No [de-identified] : adequate [de-identified] : fair to limited; (at times delays needed doctor visit, resumed smoking etc) [Does individual accept referral to MD for cessation medication or NRT?] : Does individual accept referral to MD for cessation medication or NRT? No [FreeTextEntry1] : 5

## 2024-09-14 NOTE — REVIEW OF SYSTEMS
[Negative] : Allergic/Immunologic [de-identified] : depressed [de-identified] : wnl currently but with history of being hypercoagulable for a period of time for unclear reasons

## 2024-09-14 NOTE — DISCUSSION/SUMMARY
[Date of Last Physical Exam: _____] : Date of Last Physical Exam: [unfilled] [Date of Last Annual Labs: _____] : Date of Last Annual Labs: [unfilled] [Annual Review of Systems Completed?] : Annual Review of Systems Completed: Yes [Tobacco Screening Completed?] : Tobacco Screening Completed: Yes [FreeTextEntry1] : Patient is a 73 year old, single black female and mother of adult daughter whom she has close but conflicted relations with.  aHli has struggled with depression, REGINE, insomnia and some agoraphobic tendencies.    She was hospitalized in late 2022/2023? with pulmonary embolism in setting of hypercoagulable work up; history of possible prior CVA and current sciatica. Relapsed with smoking in July 2023 when visiting family in [place] but has reduced significantly since recently on Chantix started 11/27/23.    Had been more depressed since end of 2023 because of rent arrears; seems resolved currently but anxious over granddaughter's issues with BF and his violent behavior around pt's home and currently estrangement of granddaughter with the family.

## 2024-09-14 NOTE — PLAN
[No] : No [Medication education provided] : Medication education provided. [FreeTextEntry4] : Patient gets out of the house at least a few days a week though still at times have periods of staying inside for many consecutive days with curtains closed; patient smoking 8-9 cigarettes a day and reviewed plan to further reduce [FreeTextEntry5] : Offered support/encouragement, psychoeducation and counseling regarding dx, meds, symptoms, etc.  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

## 2024-09-14 NOTE — PHYSICAL EXAM
[Average] : average [Cooperative] : cooperative [Anxious] : anxious [Constricted] : constricted [Clear] : clear [Linear/Goal Directed] : linear/goal directed [WNL] : within normal limits [Individual reports tobacco use during the last 30 days?] : Individual reports tobacco use during the last 30 days? Yes [Individual reports use of the following tobacco products during the last 30 days?] : Individual reports use of the following tobacco products during the last 30 days? Yes -  [Cigarettes] : Cigarettes [Individual reports current use of tobacco cessation medication or nicotine replacement therapy?] : Individual reports current use of tobacco cessation medication or nicotine replacement therapy? Yes [Yes] : Yes [After 60 minutes (0)] : After 60 minutes (0) [Any other (0)] : Any other (0) [10 or less (0)] : 10 or less (0) [No (0)] : No (0) [4 - 4 or more times a week] : 4 - 4 or more times a week [1 - 3 or 4] : 1 - 3 or 4 [0 - Never] : 0 - Never [0 - No] : 0 - No [de-identified] : adequate [de-identified] : fair to limited; (at times delays needed doctor visit, resumed smoking etc) [Does individual accept referral to MD for cessation medication or NRT?] : Does individual accept referral to MD for cessation medication or NRT? No [FreeTextEntry1] : 5

## 2024-09-14 NOTE — DISCUSSION/SUMMARY
[Date of Last Physical Exam: _____] : Date of Last Physical Exam: [unfilled] [Date of Last Annual Labs: _____] : Date of Last Annual Labs: [unfilled] [Annual Review of Systems Completed?] : Annual Review of Systems Completed: Yes [Tobacco Screening Completed?] : Tobacco Screening Completed: Yes [FreeTextEntry1] : Patient is a 73 year old, single black female and mother of adult daughter whom she has close but conflicted relations with.  Hali has struggled with depression, REGINE, insomnia and some agoraphobic tendencies.    She was hospitalized in late 2022/2023? with pulmonary embolism in setting of hypercoagulable work up; history of possible prior CVA and current sciatica. Relapsed with smoking in July 2023 when visiting family in [place] but has reduced significantly since recently on Chantix started 11/27/23.    Had been more depressed since end of 2023 because of rent arrears; seems resolved currently but anxious over granddaughter's issues with BF and his violent behavior around pt's home and currently estrangement of granddaughter with the family.

## 2024-09-14 NOTE — REVIEW OF SYSTEMS
[Negative] : Allergic/Immunologic [de-identified] : depressed [de-identified] : wnl currently but with history of being hypercoagulable for a period of time for unclear reasons

## 2024-09-14 NOTE — HISTORY OF PRESENT ILLNESS
[Not Applicable] : Not applicable [FreeTextEntry1] : Patient with chronic dysphoria and with some anxiety related to medical fears and going outside.   [FreeTextEntry2] : Patient reports first coming into psychiatric treatment to [facility] in the late 1980s, probably 1988 per pt.  (Records from [facility] show first appointment in 1996.) \par  \par  At the time, she stated she was crying a lot, unable to get up, laying in the dark and feeling very unsure of herself.  SHe additionally was afraid of being around crowds.  Patient stated that she was diagnosed with depression and started taking medications for this in the late 80s or early 90s and has been in treatment ever since, mostly psychopharm tx wit some attempts at therapy.   (Appears she started tx in 1996 however)\par  \par  Patient denied history of psych hospitalizations, suicidality, psychosis or javi though records indicate in 1996 she admitted to thinking about jumping out the window of her 13th story apartment that she shared with her mother.   [FreeTextEntry3] : Some include:  Paxil, Trazadone, Depakote, Lexapro, Lamictal (briefly started), Duloxetine, Gabapentin, Bupropion, lorazepam, Xanax, Ambien, Ramelteon

## 2024-09-16 ENCOUNTER — NON-APPOINTMENT (OUTPATIENT)
Age: 74
End: 2024-09-16

## 2024-09-17 ENCOUNTER — APPOINTMENT (OUTPATIENT)
Dept: GASTROENTEROLOGY | Facility: CLINIC | Age: 74
End: 2024-09-17
Payer: MEDICARE

## 2024-09-17 VITALS
RESPIRATION RATE: 17 BRPM | OXYGEN SATURATION: 99 % | BODY MASS INDEX: 24.14 KG/M2 | DIASTOLIC BLOOD PRESSURE: 76 MMHG | HEIGHT: 69 IN | SYSTOLIC BLOOD PRESSURE: 112 MMHG | WEIGHT: 163 LBS | HEART RATE: 83 BPM | TEMPERATURE: 97.3 F

## 2024-09-17 DIAGNOSIS — K25.9 GASTRIC ULCER, UNSPECIFIED AS ACUTE OR CHRONIC, W/OUT HEMORRHAGE OR PERFORATION: ICD-10-CM

## 2024-09-17 DIAGNOSIS — R68.81 EARLY SATIETY: ICD-10-CM

## 2024-09-17 PROCEDURE — 99214 OFFICE O/P EST MOD 30 MIN: CPT

## 2024-09-17 PROCEDURE — G2211 COMPLEX E/M VISIT ADD ON: CPT

## 2024-09-17 NOTE — ASSESSMENT
[FreeTextEntry1] : Impression: #Early satiety - NMGES is wnl, but sx are c/w delayed gastric emptying and EGD w/ food in her stomach.  #Hx gastric ulcers s/p PPI tx - unable to confirm healing on last EGD due to food in stomach.  Plan: - Small frequent meals for now - Dietician referral for gastroparesis diet - UGIS to r/o large lesions - Repeat EGD - Risks (including anesthesia complications, bleeding, infection, perforation) as well as benefits, alternatives, and details of both procedures discussed w/ patient. - Written instructions provided.  - Need for chaperone discussed.

## 2024-09-17 NOTE — HISTORY OF PRESENT ILLNESS
[FreeTextEntry1] : JULIAN CARNES is a 73 year F here for follow up. She has a history of CVA, PE, HTN, breast cancer s/p lumpectomy current smoker  Brief Summary: See 1/2024 for weight loss. EGD 3/2024 w/ me showed large gastric ulcers in the antrum. Bx neg for H pylori. Colonoscopy showed two 10 mm polyps, three 5 mm polyps. Path were TA for all but one HP polyp. Tx with 2 months of PPI.  Seen again 5/2024 - weight loss had abated. Still w/ early satiety and decreased appetite. BMs every 2-3 days.  We did NMGES that was wnl.  EGD 8/2024 showed significant food in gastric fundus and procedure was aborted.   Sx are the same. Early satiety with small frequent meals - does okay. Feels like she is losing weight but weight is stable in chart. Denies vomiting or postprandial pain. She has some constipation - treated with Linzess PRN. No obvious improvement in UGI sx when taking Linzess.  Denies any new meds or new GI sx.

## 2024-11-01 ENCOUNTER — OUTPATIENT (OUTPATIENT)
Dept: OUTPATIENT SERVICES | Facility: HOSPITAL | Age: 74
LOS: 1 days | Discharge: HOME | End: 2024-11-01

## 2024-11-01 DIAGNOSIS — K42.9 UMBILICAL HERNIA WITHOUT OBSTRUCTION OR GANGRENE: Chronic | ICD-10-CM

## 2024-11-01 DIAGNOSIS — Z98.49 CATARACT EXTRACTION STATUS, UNSPECIFIED EYE: Chronic | ICD-10-CM

## 2024-11-01 DIAGNOSIS — H57.9 UNSPECIFIED DISORDER OF EYE AND ADNEXA: Chronic | ICD-10-CM

## 2024-11-01 DIAGNOSIS — H72.92 UNSPECIFIED PERFORATION OF TYMPANIC MEMBRANE, LEFT EAR: Chronic | ICD-10-CM

## 2024-11-01 DIAGNOSIS — Z98.890 OTHER SPECIFIED POSTPROCEDURAL STATES: Chronic | ICD-10-CM

## 2024-11-01 DIAGNOSIS — M79.673 PAIN IN UNSPECIFIED FOOT: Chronic | ICD-10-CM

## 2024-11-12 DIAGNOSIS — F34.1 DYSTHYMIC DISORDER: ICD-10-CM

## 2024-11-12 DIAGNOSIS — F41.1 GENERALIZED ANXIETY DISORDER: ICD-10-CM

## 2024-11-12 DIAGNOSIS — F41.9 ANXIETY DISORDER, UNSPECIFIED: ICD-10-CM

## 2024-11-15 ENCOUNTER — APPOINTMENT (OUTPATIENT)
Dept: PSYCHIATRY | Facility: CLINIC | Age: 74
End: 2024-11-15
Payer: MEDICARE

## 2024-11-15 DIAGNOSIS — F34.1 DYSTHYMIC DISORDER: ICD-10-CM

## 2024-11-15 DIAGNOSIS — F41.1 GENERALIZED ANXIETY DISORDER: ICD-10-CM

## 2024-11-15 PROCEDURE — G2211 COMPLEX E/M VISIT ADD ON: CPT

## 2024-11-15 PROCEDURE — 99215 OFFICE O/P EST HI 40 MIN: CPT

## 2024-11-25 ENCOUNTER — NON-APPOINTMENT (OUTPATIENT)
Age: 74
End: 2024-11-25

## 2024-12-06 ENCOUNTER — APPOINTMENT (OUTPATIENT)
Dept: GASTROENTEROLOGY | Facility: HOSPITAL | Age: 74
End: 2024-12-06

## 2024-12-13 ENCOUNTER — APPOINTMENT (OUTPATIENT)
Dept: PSYCHIATRY | Facility: CLINIC | Age: 74
End: 2024-12-13

## 2024-12-19 ENCOUNTER — APPOINTMENT (OUTPATIENT)
Dept: NEUROLOGY | Facility: CLINIC | Age: 74
End: 2024-12-19

## 2024-12-27 ENCOUNTER — NON-APPOINTMENT (OUTPATIENT)
Age: 74
End: 2024-12-27

## 2025-01-08 ENCOUNTER — OUTPATIENT (OUTPATIENT)
Dept: OUTPATIENT SERVICES | Facility: HOSPITAL | Age: 75
LOS: 1 days | Discharge: HOME | End: 2025-01-08
Payer: MEDICARE

## 2025-01-08 ENCOUNTER — RESULT REVIEW (OUTPATIENT)
Age: 75
End: 2025-01-08

## 2025-01-08 ENCOUNTER — APPOINTMENT (OUTPATIENT)
Dept: GASTROENTEROLOGY | Facility: HOSPITAL | Age: 75
End: 2025-01-08

## 2025-01-08 DIAGNOSIS — H57.9 UNSPECIFIED DISORDER OF EYE AND ADNEXA: Chronic | ICD-10-CM

## 2025-01-08 DIAGNOSIS — Z98.49 CATARACT EXTRACTION STATUS, UNSPECIFIED EYE: Chronic | ICD-10-CM

## 2025-01-08 DIAGNOSIS — H72.92 UNSPECIFIED PERFORATION OF TYMPANIC MEMBRANE, LEFT EAR: Chronic | ICD-10-CM

## 2025-01-08 DIAGNOSIS — Z98.890 OTHER SPECIFIED POSTPROCEDURAL STATES: Chronic | ICD-10-CM

## 2025-01-08 DIAGNOSIS — M79.673 PAIN IN UNSPECIFIED FOOT: Chronic | ICD-10-CM

## 2025-01-08 DIAGNOSIS — K42.9 UMBILICAL HERNIA WITHOUT OBSTRUCTION OR GANGRENE: Chronic | ICD-10-CM

## 2025-01-08 PROCEDURE — 43239 EGD BIOPSY SINGLE/MULTIPLE: CPT

## 2025-01-08 PROCEDURE — 88305 TISSUE EXAM BY PATHOLOGIST: CPT

## 2025-01-08 PROCEDURE — 88305 TISSUE EXAM BY PATHOLOGIST: CPT | Mod: 26

## 2025-01-17 ENCOUNTER — APPOINTMENT (OUTPATIENT)
Dept: PSYCHIATRY | Facility: CLINIC | Age: 75
End: 2025-01-17
Payer: MEDICARE

## 2025-01-17 DIAGNOSIS — F41.1 GENERALIZED ANXIETY DISORDER: ICD-10-CM

## 2025-01-17 DIAGNOSIS — F17.210 NICOTINE DEPENDENCE, CIGARETTES, UNCOMPLICATED: ICD-10-CM

## 2025-01-17 DIAGNOSIS — F34.1 DYSTHYMIC DISORDER: ICD-10-CM

## 2025-01-17 DIAGNOSIS — G47.00 INSOMNIA, UNSPECIFIED: ICD-10-CM

## 2025-01-17 PROCEDURE — 99215 OFFICE O/P EST HI 40 MIN: CPT

## 2025-01-17 PROCEDURE — G2211 COMPLEX E/M VISIT ADD ON: CPT

## 2025-01-21 NOTE — ASU PATIENT PROFILE, ADULT - PRESSURE ULCER(S)
"No results found for: \"[id number]\"  CD4 ABS   Date/Time Value Ref Range Status   10/08/2024 07:20 AM 1377 359 - 1519 /uL Final     HIV-1 RNA by PCR, Qn   Date/Time Value Ref Range Status   10/08/2024 07:20 AM <20 copies/mL Final     Comment:     HIV-1 RNA detected  The reportable range for this assay is 20 to 10,000,000  copies HIV-1 RNA/mL.     HIV-1 RNA Viral Load Log   Date/Time Value Ref Range Status   10/08/2024 07:20 AM COMMENT [id number]/mL Final     Comment:     Unable to calculate result since non-numeric result obtained for  component test.         ART: Biktarvy        Denies side effects. Stressed the importance of adherence.  Continue follow up with ID clinic.       Reviewed most recent labs, including Cd4 and viral load. Discussed the risks and benefits of treatment options, instructions for management, importance of treatment adherence, and reduction of risk factor.Educated on possible  medication side effects.  Reviewed last ID visit.  Collaborated with ID to optimize medical treatment.    Counseled on routes of HIV transmission, including the risk of  infection. Emphasized that viral suppression is the best method to prevent HIV transmission.  At this time pt.denies the need for HIV testing of anyone in their life.     Total encounter time was 45 minutes. Greater then 20 minutes were spent on counseling and patient education. Pt voices understanding and agreement with treatment plan.             " no

## 2025-01-30 NOTE — ASU PATIENT PROFILE, ADULT - PATIENT REPRESENTATIVE NAME
Mom called to schedule physical for patient   Future Appointments   Date Time Provider Department Center   2/17/2025  1:00 PM Alyssia Leblanc MD [id number]    Mom wondering if we can have all the sibling on the same day back to back. I informed mom that I will ask but can't guarantee it   Tiana - mother Tiana -daughter

## 2025-02-07 ENCOUNTER — APPOINTMENT (OUTPATIENT)
Dept: PSYCHIATRY | Facility: CLINIC | Age: 75
End: 2025-02-07

## 2025-02-11 ENCOUNTER — NON-APPOINTMENT (OUTPATIENT)
Age: 75
End: 2025-02-11

## 2025-02-11 ENCOUNTER — APPOINTMENT (OUTPATIENT)
Dept: GASTROENTEROLOGY | Facility: CLINIC | Age: 75
End: 2025-02-11
Payer: MEDICARE

## 2025-02-11 VITALS
HEART RATE: 89 BPM | SYSTOLIC BLOOD PRESSURE: 130 MMHG | OXYGEN SATURATION: 96 % | HEIGHT: 68 IN | WEIGHT: 163 LBS | DIASTOLIC BLOOD PRESSURE: 82 MMHG | TEMPERATURE: 96.7 F | BODY MASS INDEX: 24.71 KG/M2

## 2025-02-11 DIAGNOSIS — R63.0 ANOREXIA: ICD-10-CM

## 2025-02-11 PROCEDURE — 99215 OFFICE O/P EST HI 40 MIN: CPT

## 2025-02-11 PROCEDURE — G2211 COMPLEX E/M VISIT ADD ON: CPT

## 2025-02-11 RX ORDER — MIRTAZAPINE 15 MG/1
15 TABLET, FILM COATED ORAL
Qty: 30 | Refills: 1 | Status: ACTIVE | COMMUNITY
Start: 2025-02-11 | End: 1900-01-01

## 2025-02-13 ENCOUNTER — APPOINTMENT (OUTPATIENT)
Dept: PSYCHIATRY | Facility: CLINIC | Age: 75
End: 2025-02-13
Payer: MEDICARE

## 2025-02-13 DIAGNOSIS — F34.1 DYSTHYMIC DISORDER: ICD-10-CM

## 2025-02-13 DIAGNOSIS — F41.1 GENERALIZED ANXIETY DISORDER: ICD-10-CM

## 2025-02-13 PROCEDURE — 99214 OFFICE O/P EST MOD 30 MIN: CPT | Mod: 95

## 2025-02-13 PROCEDURE — G2211 COMPLEX E/M VISIT ADD ON: CPT | Mod: 95

## 2025-02-21 ENCOUNTER — APPOINTMENT (OUTPATIENT)
Dept: NEUROLOGY | Facility: CLINIC | Age: 75
End: 2025-02-21
Payer: MEDICARE

## 2025-02-21 ENCOUNTER — NON-APPOINTMENT (OUTPATIENT)
Age: 75
End: 2025-02-21

## 2025-02-21 DIAGNOSIS — H81.4 VERTIGO OF CENTRAL ORIGIN: ICD-10-CM

## 2025-02-21 PROCEDURE — 99214 OFFICE O/P EST MOD 30 MIN: CPT

## 2025-02-26 ENCOUNTER — APPOINTMENT (OUTPATIENT)
Dept: PSYCHIATRY | Facility: CLINIC | Age: 75
End: 2025-02-26
Payer: MEDICARE

## 2025-02-26 DIAGNOSIS — G47.00 INSOMNIA, UNSPECIFIED: ICD-10-CM

## 2025-02-26 DIAGNOSIS — F34.1 DYSTHYMIC DISORDER: ICD-10-CM

## 2025-02-26 DIAGNOSIS — F41.1 GENERALIZED ANXIETY DISORDER: ICD-10-CM

## 2025-02-26 PROCEDURE — 99214 OFFICE O/P EST MOD 30 MIN: CPT | Mod: 95

## 2025-02-26 PROCEDURE — G2211 COMPLEX E/M VISIT ADD ON: CPT | Mod: 95

## 2025-03-07 ENCOUNTER — APPOINTMENT (OUTPATIENT)
Dept: PSYCHIATRY | Facility: CLINIC | Age: 75
End: 2025-03-07

## 2025-03-21 ENCOUNTER — APPOINTMENT (OUTPATIENT)
Dept: PSYCHIATRY | Facility: CLINIC | Age: 75
End: 2025-03-21

## 2025-03-24 ENCOUNTER — APPOINTMENT (OUTPATIENT)
Dept: GASTROENTEROLOGY | Facility: CLINIC | Age: 75
End: 2025-03-24

## 2025-05-04 NOTE — ED PROVIDER NOTE - CARDIOVASCULAR NEGATIVE STATEMENT, MLM
Transition Communication Hand-off for Care Transitions to Next Level of Care Provider    Name: Nadira Perez  : 1950  MRN #: [id number]  Primary Care Provider: Matilde Quiñonez     Primary Clinic: [address] [place] [id number]     Reason for Hospitalization:  Obstruction concurrent with and due to hernia of abdominal wall [K43.6]  Admit Date/Time: 2025 11:21 AM  Discharge Date: -  Payor Source: Payor: [facility] / Plan: [facility] MEDICARE ADVANTAGE / Product Type: Medicare /            Reason for Communication Hand-off Referral: Other continuity of care    Discharge Plan: plan to discharge home, a home care referral was sent for physical therapy but the patient is not yet certain if she prefers using this service vs relying on family.       Concern for non-adherence with plan of care:   Y/N no  Discharge Needs Assessment:  Needs      Flowsheet Row Most Recent Value   Equipment Currently Used at Home commode chair, shower chair, walker, rolling, other (see comments)  [4WW,  right AFO, fully adjustable sleep number bed]            Already enrolled in Tele-monitoring program and name of program:  no  Follow-up specialty is recommended: Yes    Follow-up plan:    Future Appointments   Date Time Provider Department Center   2025  3:00 PM Juan Agudelo MD [facility]   5/15/2025  1:40 PM Sahil Kimball MD [facility]   2025  1:00 PM Jacobo Cintron DO [facility]   2025  9:40 AM Shannan Jeffers PA-C [facility]   2025  3:50 PM Thais Saldivar MD [facility]   2025 11:00 AM Carmen Centeno MD [facility]   2025  8:30 AM [facility]   2025 10:25 AM Abe Wilkerson MD [facility]   2025  2:30 PM Cinthia Rascon MD [facility]   2026 10:30 AM PusalaNathan jones MD [facility]       Any outstanding tests or procedures:              Key Recommendations:      Ciera BOWENS  LEILA Puckett    AVS/Discharge Summary is the source of truth; this is a helpful guide for improved communication of patient story             normal rate and rhythm, no chest pain and no edema.

## 2025-06-10 ENCOUNTER — APPOINTMENT (OUTPATIENT)
Dept: INTERNAL MEDICINE | Facility: CLINIC | Age: 75
End: 2025-06-10
Payer: MEDICARE

## 2025-06-10 VITALS
DIASTOLIC BLOOD PRESSURE: 77 MMHG | HEIGHT: 68 IN | TEMPERATURE: 98.3 F | SYSTOLIC BLOOD PRESSURE: 119 MMHG | OXYGEN SATURATION: 96 % | HEART RATE: 69 BPM | WEIGHT: 167 LBS | BODY MASS INDEX: 25.31 KG/M2

## 2025-06-10 PROBLEM — R07.89 CHEST DISCOMFORT: Status: ACTIVE | Noted: 2025-06-10

## 2025-06-10 PROCEDURE — G2211 COMPLEX E/M VISIT ADD ON: CPT

## 2025-06-10 PROCEDURE — 93000 ELECTROCARDIOGRAM COMPLETE: CPT

## 2025-06-10 PROCEDURE — 99214 OFFICE O/P EST MOD 30 MIN: CPT

## 2025-06-11 ENCOUNTER — APPOINTMENT (OUTPATIENT)
Dept: OTOLARYNGOLOGY | Facility: CLINIC | Age: 75
End: 2025-06-11

## 2025-06-24 ENCOUNTER — APPOINTMENT (OUTPATIENT)
Dept: HEART AND VASCULAR | Facility: CLINIC | Age: 75
End: 2025-06-24

## 2025-06-27 ENCOUNTER — APPOINTMENT (OUTPATIENT)
Dept: PSYCHIATRY | Facility: CLINIC | Age: 75
End: 2025-06-27

## 2025-06-27 PROCEDURE — G2211 COMPLEX E/M VISIT ADD ON: CPT

## 2025-06-27 PROCEDURE — 99215 OFFICE O/P EST HI 40 MIN: CPT

## 2025-07-08 ENCOUNTER — APPOINTMENT (OUTPATIENT)
Dept: HEART AND VASCULAR | Facility: CLINIC | Age: 75
End: 2025-07-08
Payer: MEDICARE

## 2025-07-08 VITALS
DIASTOLIC BLOOD PRESSURE: 76 MMHG | WEIGHT: 164 LBS | TEMPERATURE: 98 F | SYSTOLIC BLOOD PRESSURE: 112 MMHG | HEART RATE: 99 BPM | OXYGEN SATURATION: 99 % | BODY MASS INDEX: 24.86 KG/M2 | HEIGHT: 68 IN

## 2025-07-08 PROCEDURE — 93000 ELECTROCARDIOGRAM COMPLETE: CPT

## 2025-07-08 PROCEDURE — 99213 OFFICE O/P EST LOW 20 MIN: CPT

## 2025-07-14 ENCOUNTER — RX RENEWAL (OUTPATIENT)
Age: 75
End: 2025-07-14

## 2025-07-21 ENCOUNTER — RX RENEWAL (OUTPATIENT)
Age: 75
End: 2025-07-21

## 2025-08-22 ENCOUNTER — APPOINTMENT (OUTPATIENT)
Dept: PSYCHIATRY | Facility: CLINIC | Age: 75
End: 2025-08-22

## 2025-08-22 DIAGNOSIS — F17.210 NICOTINE DEPENDENCE, CIGARETTES, UNCOMPLICATED: ICD-10-CM

## 2025-08-22 DIAGNOSIS — F34.1 DYSTHYMIC DISORDER: ICD-10-CM

## 2025-08-22 DIAGNOSIS — F41.1 GENERALIZED ANXIETY DISORDER: ICD-10-CM

## 2025-08-22 PROCEDURE — G2212 PROLONG OUTPT/OFFICE VIS: CPT

## 2025-08-22 PROCEDURE — 99215 OFFICE O/P EST HI 40 MIN: CPT

## 2025-08-22 RX ORDER — BUPROPION HYDROCHLORIDE 150 MG/1
150 TABLET, EXTENDED RELEASE ORAL
Qty: 30 | Refills: 1 | Status: ACTIVE | COMMUNITY
Start: 2025-08-22 | End: 1900-01-01

## 2025-09-19 ENCOUNTER — APPOINTMENT (OUTPATIENT)
Dept: PSYCHIATRY | Facility: CLINIC | Age: 75
End: 2025-09-19

## (undated) DEVICE — DRAPE STERI-DRAPE MEDIUM W APERTURE

## (undated) DEVICE — PACK GENERAL MINOR

## (undated) DEVICE — STAPLER SKIN VISI-STAT 35 WIDE

## (undated) DEVICE — LAP PAD 4 X 18"

## (undated) DEVICE — GLV 7.5 PROTEXIS (WHITE)

## (undated) DEVICE — SUT VICRYL PLUS 3-0 27" SH UNDYED

## (undated) DEVICE — ELCTR BOVIE TIP BLADE INSULATED 2.75" EDGE

## (undated) DEVICE — SLV COMPRESSION KNEE MED

## (undated) DEVICE — MARKING PEN W RULER

## (undated) DEVICE — FORCEP RADIAL JAW 4 W NDL 2.2MM 2.8MM 240CM ORANGE DISP

## (undated) DEVICE — SNARE CAPTIVATOR COLD RND STIFF 10X2.4X2.8MM 240CM

## (undated) DEVICE — WARMING BLANKET FULL UNDERBODY

## (undated) DEVICE — DRAPE IOBAN 13" X 13"

## (undated) DEVICE — SYR ASEPTO

## (undated) DEVICE — POLY TRAP ETRAP

## (undated) DEVICE — SUT VICRYL 2-0 18" CT-1 UNDYED (POP-OFF)

## (undated) DEVICE — SUCTION YANKAUER FLEXIBE HI CAPACITY NO VENT

## (undated) DEVICE — TUBING SUCTION 20FT

## (undated) DEVICE — DRSG DERMABOND 0.7ML

## (undated) DEVICE — SUT MONOCRYL 4-0 27" PS-2 UNDYED